# Patient Record
Sex: FEMALE | Race: WHITE | NOT HISPANIC OR LATINO | URBAN - METROPOLITAN AREA
[De-identification: names, ages, dates, MRNs, and addresses within clinical notes are randomized per-mention and may not be internally consistent; named-entity substitution may affect disease eponyms.]

---

## 2017-04-28 ENCOUNTER — OUTPATIENT (OUTPATIENT)
Dept: OUTPATIENT SERVICES | Facility: HOSPITAL | Age: 42
LOS: 1 days | Discharge: HOME | End: 2017-04-28

## 2017-06-28 DIAGNOSIS — R42 DIZZINESS AND GIDDINESS: ICD-10-CM

## 2017-06-28 DIAGNOSIS — R00.2 PALPITATIONS: ICD-10-CM

## 2017-06-28 DIAGNOSIS — R06.02 SHORTNESS OF BREATH: ICD-10-CM

## 2017-10-01 ENCOUNTER — EMERGENCY (EMERGENCY)
Facility: HOSPITAL | Age: 42
LOS: 0 days | Discharge: HOME | End: 2017-10-01
Admitting: INTERNAL MEDICINE

## 2017-10-01 DIAGNOSIS — M79.89 OTHER SPECIFIED SOFT TISSUE DISORDERS: ICD-10-CM

## 2017-10-01 DIAGNOSIS — Z90.49 ACQUIRED ABSENCE OF OTHER SPECIFIED PARTS OF DIGESTIVE TRACT: ICD-10-CM

## 2017-10-01 DIAGNOSIS — I10 ESSENTIAL (PRIMARY) HYPERTENSION: ICD-10-CM
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
## 2017-10-01 DIAGNOSIS — Z79.899 OTHER LONG TERM (CURRENT) DRUG THERAPY: ICD-10-CM

## 2017-10-01 DIAGNOSIS — Z79.82 LONG TERM (CURRENT) USE OF ASPIRIN: ICD-10-CM
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
## 2017-10-01 DIAGNOSIS — M25.562 PAIN IN LEFT KNEE: ICD-10-CM

## 2017-10-01 DIAGNOSIS — I48.91 UNSPECIFIED ATRIAL FIBRILLATION: ICD-10-CM

## 2017-10-01 DIAGNOSIS — E78.00 PURE HYPERCHOLESTEROLEMIA, UNSPECIFIED: ICD-10-CM

## 2017-10-01 DIAGNOSIS — F41.9 ANXIETY DISORDER, UNSPECIFIED: ICD-10-CM

## 2019-03-19 ENCOUNTER — TRANSCRIPTION ENCOUNTER (OUTPATIENT)
Age: 44
End: 2019-03-19

## 2019-03-19 ENCOUNTER — OUTPATIENT (OUTPATIENT)
Dept: OUTPATIENT SERVICES | Facility: HOSPITAL | Age: 44
LOS: 1 days | Discharge: HOME | End: 2019-03-19

## 2019-03-19 VITALS — HEIGHT: 66.14 IN | WEIGHT: 293 LBS

## 2019-03-19 VITALS — HEART RATE: 80 BPM | SYSTOLIC BLOOD PRESSURE: 118 MMHG | RESPIRATION RATE: 18 BRPM | DIASTOLIC BLOOD PRESSURE: 72 MMHG

## 2019-03-19 DIAGNOSIS — Z90.49 ACQUIRED ABSENCE OF OTHER SPECIFIED PARTS OF DIGESTIVE TRACT: Chronic | ICD-10-CM

## 2019-03-19 DIAGNOSIS — Z98.890 OTHER SPECIFIED POSTPROCEDURAL STATES: Chronic | ICD-10-CM

## 2019-03-19 RX ORDER — SODIUM CHLORIDE 9 MG/ML
1000 INJECTION, SOLUTION INTRAVENOUS
Qty: 0 | Refills: 0 | Status: DISCONTINUED | OUTPATIENT
Start: 2019-03-19 | End: 2019-04-03

## 2019-03-19 RX ORDER — ONDANSETRON 8 MG/1
4 TABLET, FILM COATED ORAL ONCE
Qty: 0 | Refills: 0 | Status: DISCONTINUED | OUTPATIENT
Start: 2019-03-19 | End: 2019-04-03

## 2019-03-19 NOTE — PRE-ANESTHESIA EVALUATION ADULT - NSANTHOSAYNRD_GEN_A_CORE
No. KANDICE screening performed.  STOP BANG Legend: 0-2 = LOW Risk; 3-4 = INTERMEDIATE Risk; 5-8 = HIGH Risk

## 2019-03-19 NOTE — H&P PST ADULT - HISTORY OF PRESENT ILLNESS
A 44 y old female patient with hx of Afib on xarelto off now, KANDICE on CPAP presented for colonoscopy after positive FOBT

## 2019-03-20 LAB — SURGICAL PATHOLOGY STUDY: SIGNIFICANT CHANGE UP

## 2019-03-24 DIAGNOSIS — K64.8 OTHER HEMORRHOIDS: ICD-10-CM

## 2019-03-24 DIAGNOSIS — K57.30 DIVERTICULOSIS OF LARGE INTESTINE WITHOUT PERFORATION OR ABSCESS WITHOUT BLEEDING: ICD-10-CM

## 2019-03-24 DIAGNOSIS — R19.5 OTHER FECAL ABNORMALITIES: ICD-10-CM

## 2019-03-24 DIAGNOSIS — D12.8 BENIGN NEOPLASM OF RECTUM: ICD-10-CM

## 2019-03-27 PROBLEM — M54.9 DORSALGIA, UNSPECIFIED: Chronic | Status: ACTIVE | Noted: 2019-03-19

## 2019-03-27 PROBLEM — I10 ESSENTIAL (PRIMARY) HYPERTENSION: Chronic | Status: ACTIVE | Noted: 2019-03-19

## 2019-03-27 PROBLEM — I48.91 UNSPECIFIED ATRIAL FIBRILLATION: Chronic | Status: ACTIVE | Noted: 2019-03-19

## 2020-11-20 NOTE — ASU DISCHARGE PLAN (ADULT/PEDIATRIC) - D. IF YOU HAD ANY TYPE OF SEDATION, YOU MAY EXPERIENCE LIGHTHEADEDNESS, DIZZINESS, OR SLEEPINESS FOLLOWING YOUR PROCEDURE. A RESPONSIBLE ADULT SHOULD STAY WITH YOU FOR AT LEAST 24 HOURS FOLLOWING YOUR PROCEDURE.
[General Appearance - Well Developed] : well developed [Normal Appearance] : normal appearance [Well Groomed] : well groomed [General Appearance - Well Nourished] : well nourished Statement Selected [No Deformities] : no deformities [General Appearance - In No Acute Distress] : no acute distress [Normal Conjunctiva] : the conjunctiva exhibited no abnormalities [Eyelids - No Xanthelasma] : the eyelids demonstrated no xanthelasmas [Normal Oral Mucosa] : normal oral mucosa [No Oral Pallor] : no oral pallor [No Oral Cyanosis] : no oral cyanosis [Normal Jugular Venous A Waves Present] : normal jugular venous A waves present [Normal Jugular Venous V Waves Present] : normal jugular venous V waves present [No Jugular Venous Don A Waves] : no jugular venous don A waves [Heart Rate And Rhythm] : heart rate and rhythm were normal [Heart Sounds] : normal S1 and S2 [Murmurs] : no murmurs present [Respiration, Rhythm And Depth] : normal respiratory rhythm and effort [Exaggerated Use Of Accessory Muscles For Inspiration] : no accessory muscle use [Auscultation Breath Sounds / Voice Sounds] : lungs were clear to auscultation bilaterally [Abdomen Soft] : soft [Abdomen Tenderness] : non-tender [Abdomen Mass (___ Cm)] : no abdominal mass palpated [Abnormal Walk] : normal gait [Gait - Sufficient For Exercise Testing] : the gait was sufficient for exercise testing [Nail Clubbing] : no clubbing of the fingernails [Cyanosis, Localized] : no localized cyanosis [Petechial Hemorrhages (___cm)] : no petechial hemorrhages [Skin Color & Pigmentation] : normal skin color and pigmentation [] : no rash [No Venous Stasis] : no venous stasis [Skin Lesions] : no skin lesions [No Skin Ulcers] : no skin ulcer [No Xanthoma] : no  xanthoma was observed [Oriented To Time, Place, And Person] : oriented to person, place, and time [Affect] : the affect was normal [Mood] : the mood was normal [No Anxiety] : not feeling anxious

## 2023-03-02 ENCOUNTER — OUTPATIENT (OUTPATIENT)
Dept: OUTPATIENT SERVICES | Facility: HOSPITAL | Age: 48
LOS: 1 days | End: 2023-03-02
Payer: COMMERCIAL

## 2023-03-02 ENCOUNTER — LABORATORY RESULT (OUTPATIENT)
Age: 48
End: 2023-03-02

## 2023-03-02 ENCOUNTER — APPOINTMENT (OUTPATIENT)
Dept: BURN CARE | Facility: CLINIC | Age: 48
End: 2023-03-02
Payer: COMMERCIAL

## 2023-03-02 VITALS — SYSTOLIC BLOOD PRESSURE: 130 MMHG | HEART RATE: 110 BPM | DIASTOLIC BLOOD PRESSURE: 80 MMHG

## 2023-03-02 DIAGNOSIS — Z98.890 OTHER SPECIFIED POSTPROCEDURAL STATES: Chronic | ICD-10-CM

## 2023-03-02 DIAGNOSIS — L97.909 VARICOSE VEINS OF UNSPECIFIED LOWER EXTREMITY WITH ULCER OF UNSPECIFIED SITE: ICD-10-CM

## 2023-03-02 DIAGNOSIS — Z90.49 ACQUIRED ABSENCE OF OTHER SPECIFIED PARTS OF DIGESTIVE TRACT: Chronic | ICD-10-CM

## 2023-03-02 DIAGNOSIS — Z00.8 ENCOUNTER FOR OTHER GENERAL EXAMINATION: ICD-10-CM

## 2023-03-02 DIAGNOSIS — I83.009 VARICOSE VEINS OF UNSPECIFIED LOWER EXTREMITY WITH ULCER OF UNSPECIFIED SITE: ICD-10-CM

## 2023-03-02 PROCEDURE — 87186 SC STD MICRODIL/AGAR DIL: CPT

## 2023-03-02 PROCEDURE — 87077 CULTURE AEROBIC IDENTIFY: CPT

## 2023-03-02 PROCEDURE — 99213 OFFICE O/P EST LOW 20 MIN: CPT

## 2023-03-02 PROCEDURE — 87070 CULTURE OTHR SPECIMN AEROBIC: CPT

## 2023-03-02 PROCEDURE — 87075 CULTR BACTERIA EXCEPT BLOOD: CPT

## 2023-03-02 NOTE — REASON FOR VISIT
[Initial] : initial visit [Were you seen in the Emergency Room?] : seen in the emergency room [Were you admitted to the burn center at [facility]?] : not admitted to the burn center at [facility] [Family Member] : family member

## 2023-03-02 NOTE — ASSESSMENT
[FreeTextEntry1] : The venous stasis ulcers on the right leg measures 5x5cm and the left leg  measures  [id number].  A wound culture was obtained. The left leg has adherent devitalized tissue.  The patient was instructed to clean  the wound with soap and water. Wash the wound with hibiclens soap and apply topical mupiricin ointment. Follow up 1 week. May need debridement .  Ace wrap compression applied.  [Wound Care] : wound care

## 2023-03-02 NOTE — PHYSICAL EXAM
[Size%: ______] : Size: [unfilled]% [5] : 5 out of 10 [Abnormal] : abnormal [Large] : medium [] : no [de-identified] : cont current pain meds and consider pain management [de-identified] : The venous stasis ulcers on the right leg measures 5x5cm and the left leg  measures  [id number].  A wound culture was obtained. The left leg has adherent devitalized tissue.  The patient was instructed to clean  the wound with soap and water. Wash the wound with hibiclens soap and apply topical mupiricin ointment. Follow up 1 week. May need debridement .  Ace wrap compression applied.  [TWNoteComboBox1] : xeroform
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
## 2023-03-03 DIAGNOSIS — I83.019 VARICOSE VEINS OF RIGHT LOWER EXTREMITY WITH ULCER OF UNSPECIFIED SITE: ICD-10-CM

## 2023-03-03 DIAGNOSIS — I83.029 VARICOSE VEINS OF LEFT LOWER EXTREMITY WITH ULCER OF UNSPECIFIED SITE: ICD-10-CM

## 2023-03-03 DIAGNOSIS — L97.829 NON-PRESSURE CHRONIC ULCER OF OTHER PART OF LEFT LOWER LEG WITH UNSPECIFIED SEVERITY: ICD-10-CM

## 2023-03-03 DIAGNOSIS — L97.929 NON-PRESSURE CHRONIC ULCER OF UNSPECIFIED PART OF LEFT LOWER LEG WITH UNSPECIFIED SEVERITY: ICD-10-CM

## 2023-03-08 LAB — BACTERIA SPEC CULT: ABNORMAL

## 2023-03-09 ENCOUNTER — APPOINTMENT (OUTPATIENT)
Dept: BURN CARE | Facility: CLINIC | Age: 48
End: 2023-03-09
Payer: COMMERCIAL

## 2023-03-09 ENCOUNTER — OUTPATIENT (OUTPATIENT)
Dept: OUTPATIENT SERVICES | Facility: HOSPITAL | Age: 48
LOS: 1 days | End: 2023-03-09
Payer: COMMERCIAL

## 2023-03-09 ENCOUNTER — TRANSCRIPTION ENCOUNTER (OUTPATIENT)
Age: 48
End: 2023-03-09

## 2023-03-09 VITALS — HEART RATE: 105 BPM | DIASTOLIC BLOOD PRESSURE: 89 MMHG | TEMPERATURE: 98.7 F | SYSTOLIC BLOOD PRESSURE: 135 MMHG

## 2023-03-09 DIAGNOSIS — Z90.49 ACQUIRED ABSENCE OF OTHER SPECIFIED PARTS OF DIGESTIVE TRACT: Chronic | ICD-10-CM

## 2023-03-09 DIAGNOSIS — Z00.8 ENCOUNTER FOR OTHER GENERAL EXAMINATION: ICD-10-CM

## 2023-03-09 DIAGNOSIS — Z00.00 ENCOUNTER FOR GENERAL ADULT MEDICAL EXAMINATION W/OUT ABNORMAL FINDINGS: ICD-10-CM

## 2023-03-09 DIAGNOSIS — Z98.890 OTHER SPECIFIED POSTPROCEDURAL STATES: Chronic | ICD-10-CM

## 2023-03-09 PROCEDURE — 99213 OFFICE O/P EST LOW 20 MIN: CPT

## 2023-03-09 PROCEDURE — 87186 SC STD MICRODIL/AGAR DIL: CPT

## 2023-03-09 PROCEDURE — 87070 CULTURE OTHR SPECIMN AEROBIC: CPT

## 2023-03-09 PROCEDURE — 87077 CULTURE AEROBIC IDENTIFY: CPT
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
## 2023-03-09 NOTE — REASON FOR VISIT
[Revisit] : revisit [Were you seen in the Emergency Room?] : seen in the emergency room [Were you admitted to the burn center at [facility]?] : not admitted to the burn center at [facility] [Family Member] : family member

## 2023-03-09 NOTE — ASSESSMENT
[FreeTextEntry1] : The venous stasis ulcers on the right leg measures 5x5cm and the left leg  measures  [id number].  A wound culture was obtained. The left leg has  decreased adherent devitalized tissue.  The patient was instructed to clean  the wound with soap and water. Wash the wound with hibiclens soap and apply topical mupiricin ointment. Follow up 1 week. Topical and  start oral antibiotics   ID referrel .  Ace wrap compression applied.  [Wound Care] : wound care

## 2023-03-10 DIAGNOSIS — L97.319 NON-PRESSURE CHRONIC ULCER OF RIGHT ANKLE WITH UNSPECIFIED SEVERITY: ICD-10-CM

## 2023-03-10 DIAGNOSIS — I83.029 VARICOSE VEINS OF LEFT LOWER EXTREMITY WITH ULCER OF UNSPECIFIED SITE: ICD-10-CM

## 2023-03-10 DIAGNOSIS — I83.013 VARICOSE VEINS OF RIGHT LOWER EXTREMITY WITH ULCER OF ANKLE: ICD-10-CM

## 2023-03-10 DIAGNOSIS — L08.89 OTHER SPECIFIED LOCAL INFECTIONS OF THE SKIN AND SUBCUTANEOUS TISSUE: ICD-10-CM

## 2023-03-10 DIAGNOSIS — L97.929 NON-PRESSURE CHRONIC ULCER OF UNSPECIFIED PART OF LEFT LOWER LEG WITH UNSPECIFIED SEVERITY: ICD-10-CM

## 2023-03-11 LAB — BACTERIA SPEC CULT: ABNORMAL

## 2023-03-30 ENCOUNTER — OUTPATIENT (OUTPATIENT)
Dept: OUTPATIENT SERVICES | Facility: HOSPITAL | Age: 48
LOS: 1 days | End: 2023-03-30
Payer: COMMERCIAL

## 2023-03-30 ENCOUNTER — APPOINTMENT (OUTPATIENT)
Dept: BURN CARE | Facility: CLINIC | Age: 48
End: 2023-03-30
Payer: COMMERCIAL

## 2023-03-30 VITALS — TEMPERATURE: 98 F | HEART RATE: 103 BPM | SYSTOLIC BLOOD PRESSURE: 162 MMHG | DIASTOLIC BLOOD PRESSURE: 109 MMHG

## 2023-03-30 DIAGNOSIS — Z90.49 ACQUIRED ABSENCE OF OTHER SPECIFIED PARTS OF DIGESTIVE TRACT: Chronic | ICD-10-CM

## 2023-03-30 DIAGNOSIS — Z98.890 OTHER SPECIFIED POSTPROCEDURAL STATES: Chronic | ICD-10-CM

## 2023-03-30 DIAGNOSIS — Z00.8 ENCOUNTER FOR OTHER GENERAL EXAMINATION: ICD-10-CM

## 2023-03-30 PROCEDURE — 87077 CULTURE AEROBIC IDENTIFY: CPT

## 2023-03-30 PROCEDURE — 99212 OFFICE O/P EST SF 10 MIN: CPT

## 2023-03-30 PROCEDURE — 87186 SC STD MICRODIL/AGAR DIL: CPT

## 2023-03-30 PROCEDURE — 87070 CULTURE OTHR SPECIMN AEROBIC: CPT
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
## 2023-03-30 NOTE — ASSESSMENT
[FreeTextEntry1] : The venous stasis ulcers on the right leg measures 5x5cm and the left leg  measures  [id number].  A wound culture was obtained. The left leg has  decreased adherent devitalized tissue.  The patient was instructed to clean  the wound with soap and water. Wash the wound with hibiclens soap and apply topical mupiricin ointment. Follow up 3 week.   ID referrel .  Ace wrap compression applied.  [Wound Care] : wound care

## 2023-03-30 NOTE — PHYSICAL EXAM
[Healing] : healing [Size%: ______] : Size: [unfilled]% [Infected?] : Infected: No [3] : 3 out of 10 [Abnormal] : abnormal [Large] : medium [] : yes [de-identified] : cont current pain meds and consider pain management [de-identified] : The venous stasis ulcers on the right leg measures 5x5cm and the left leg  measures  [id number].  A wound culture was obtained. The left leg has  decreased adherent devitalized tissue.  The patient was instructed to clean  the wound with soap and water. Wash the wound with hibiclens soap and apply topical mupiricin ointment. Follow up 3 week.   ID referrel .  Ace wrap compression applied.  [TWNoteComboBox1] : xeroform

## 2023-03-30 NOTE — REASON FOR VISIT
[Revisit] : revisit [Were you seen in the Emergency Room?] : seen in the emergency room [Were you admitted to the burn center at [facility]?] : not admitted to the burn center at [facility] [Family Member] : family member

## 2023-03-31 DIAGNOSIS — I83.029 VARICOSE VEINS OF LEFT LOWER EXTREMITY WITH ULCER OF UNSPECIFIED SITE: ICD-10-CM

## 2023-03-31 DIAGNOSIS — L97.929 NON-PRESSURE CHRONIC ULCER OF UNSPECIFIED PART OF LEFT LOWER LEG WITH UNSPECIFIED SEVERITY: ICD-10-CM

## 2023-03-31 DIAGNOSIS — I83.013 VARICOSE VEINS OF RIGHT LOWER EXTREMITY WITH ULCER OF ANKLE: ICD-10-CM

## 2023-03-31 DIAGNOSIS — L97.319 NON-PRESSURE CHRONIC ULCER OF RIGHT ANKLE WITH UNSPECIFIED SEVERITY: ICD-10-CM

## 2023-04-02 LAB — BACTERIA SPEC CULT: ABNORMAL

## 2023-04-26 NOTE — PHYSICAL EXAM
[Healing] : healing no [Size%: ______] : Size: [unfilled]% [Infected?] : Infected: Yes [3] : 3 out of 10 [Abnormal] : abnormal [Large] : medium [] : no [de-identified] : cont current pain meds and consider pain management [de-identified] : The venous stasis ulcers on the right leg measures 5x5cm and the left leg  measures  [id number].  A wound culture was obtained. The left leg has  decreased adherent devitalized tissue.  The patient was instructed to clean  the wound with soap and water. Wash the wound with hibiclens soap and apply topical mupiricin ointment. Follow up 1 week. Topical and  start oral antibiotics   ID referrel .  Ace wrap compression applied.  [TWNoteComboBox1] : xeroform

## 2023-04-27 ENCOUNTER — OUTPATIENT (OUTPATIENT)
Dept: OUTPATIENT SERVICES | Facility: HOSPITAL | Age: 48
LOS: 1 days | End: 2023-04-27
Payer: COMMERCIAL

## 2023-04-27 ENCOUNTER — APPOINTMENT (OUTPATIENT)
Dept: BURN CARE | Facility: CLINIC | Age: 48
End: 2023-04-27
Payer: COMMERCIAL

## 2023-04-27 VITALS — SYSTOLIC BLOOD PRESSURE: 120 MMHG | HEART RATE: 80 BPM | TEMPERATURE: 97.6 F | DIASTOLIC BLOOD PRESSURE: 78 MMHG

## 2023-04-27 DIAGNOSIS — Z90.49 ACQUIRED ABSENCE OF OTHER SPECIFIED PARTS OF DIGESTIVE TRACT: Chronic | ICD-10-CM

## 2023-04-27 DIAGNOSIS — Z98.890 OTHER SPECIFIED POSTPROCEDURAL STATES: Chronic | ICD-10-CM

## 2023-04-27 DIAGNOSIS — Z00.8 ENCOUNTER FOR OTHER GENERAL EXAMINATION: ICD-10-CM

## 2023-04-27 PROCEDURE — 87070 CULTURE OTHR SPECIMN AEROBIC: CPT

## 2023-04-27 PROCEDURE — 99212 OFFICE O/P EST SF 10 MIN: CPT

## 2023-04-27 PROCEDURE — 87186 SC STD MICRODIL/AGAR DIL: CPT

## 2023-04-27 NOTE — PHYSICAL EXAM
Quality 130: Documentation Of Current Medications In The Medical Record: Current Medications Documented Quality 431: Preventive Care And Screening: Unhealthy Alcohol Use - Screening: Patient not identified as an unhealthy alcohol user when screened for unhealthy alcohol use using a systematic screening method Detail Level: Detailed Quality 226: Preventive Care And Screening: Tobacco Use: Screening And Cessation Intervention: Patient screened for tobacco use and is an ex/non-smoker [Healing] : healing [Size%: ______] : Size: [unfilled]% [Infected?] : Infected: No [3] : 3 out of 10 [Abnormal] : abnormal [Large] : medium [] : no [de-identified] : cont current pain meds and consider pain management [de-identified] : The venous stasis ulcers on the right leg measures 1x1cm and the left leg  measures  [id number].  A wound culture was obtained. The left leg has  decreased adherent devitalized tissue.  The patient was instructed to clean  the wound with soap and water. Wash the wound with hibiclens soap and apply topical mupiricin ointment. Follow up 2-4week.  .  Ace wrap compression applied.  [TWNoteComboBox1] : xeroform

## 2023-04-27 NOTE — REASON FOR VISIT
[Revisit] : revisit [Were you seen in the Emergency Room?] : seen in the emergency room [Were you admitted to the burn center at [facility]?] : not admitted to the burn center at [facility] [Family Member] : family member

## 2023-04-27 NOTE — ASSESSMENT
[FreeTextEntry1] : The venous stasis ulcers on the right leg measures 1x1cm and the left leg  measures  [id number].  A wound culture was obtained. The left leg has  decreased adherent devitalized tissue.  The patient was instructed to clean  the wound with soap and water. Wash the wound with hibiclens soap and apply topical mupiricin ointment. Follow up 2-4week.  .  Ace wrap compression applied.  [Wound Care] : wound care
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
## 2023-04-28 DIAGNOSIS — I83.013 VARICOSE VEINS OF RIGHT LOWER EXTREMITY WITH ULCER OF ANKLE: ICD-10-CM

## 2023-04-28 DIAGNOSIS — I83.029 VARICOSE VEINS OF LEFT LOWER EXTREMITY WITH ULCER OF UNSPECIFIED SITE: ICD-10-CM

## 2023-04-28 DIAGNOSIS — L97.929 NON-PRESSURE CHRONIC ULCER OF UNSPECIFIED PART OF LEFT LOWER LEG WITH UNSPECIFIED SEVERITY: ICD-10-CM

## 2023-04-28 DIAGNOSIS — L97.319 NON-PRESSURE CHRONIC ULCER OF RIGHT ANKLE WITH UNSPECIFIED SEVERITY: ICD-10-CM

## 2023-04-29 LAB — BACTERIA SPEC CULT: ABNORMAL

## 2023-05-25 ENCOUNTER — APPOINTMENT (OUTPATIENT)
Dept: BURN CARE | Facility: CLINIC | Age: 48
End: 2023-05-25

## 2023-06-07 ENCOUNTER — INPATIENT (INPATIENT)
Facility: HOSPITAL | Age: 48
LOS: 0 days | Discharge: HOME HEALTH | DRG: 309 | End: 2023-06-08
Attending: INTERNAL MEDICINE | Admitting: INTERNAL MEDICINE
Payer: COMMERCIAL

## 2023-06-07 VITALS
DIASTOLIC BLOOD PRESSURE: 107 MMHG | RESPIRATION RATE: 20 BRPM | WEIGHT: 293 LBS | HEIGHT: 66 IN | SYSTOLIC BLOOD PRESSURE: 188 MMHG | OXYGEN SATURATION: 96 % | HEART RATE: 161 BPM | TEMPERATURE: 98 F

## 2023-06-07 DIAGNOSIS — Z90.49 ACQUIRED ABSENCE OF OTHER SPECIFIED PARTS OF DIGESTIVE TRACT: Chronic | ICD-10-CM

## 2023-06-07 DIAGNOSIS — Z98.890 OTHER SPECIFIED POSTPROCEDURAL STATES: Chronic | ICD-10-CM

## 2023-06-07 DIAGNOSIS — I48.91 UNSPECIFIED ATRIAL FIBRILLATION: ICD-10-CM

## 2023-06-07 LAB
ALBUMIN SERPL ELPH-MCNC: 3.9 G/DL — SIGNIFICANT CHANGE UP (ref 3.5–5.2)
ALP SERPL-CCNC: 102 U/L — SIGNIFICANT CHANGE UP (ref 30–115)
ALT FLD-CCNC: 20 U/L — SIGNIFICANT CHANGE UP (ref 0–41)
ANION GAP SERPL CALC-SCNC: 15 MMOL/L — HIGH (ref 7–14)
AST SERPL-CCNC: 27 U/L — SIGNIFICANT CHANGE UP (ref 0–41)
BASOPHILS # BLD AUTO: 0.03 K/UL — SIGNIFICANT CHANGE UP (ref 0–0.2)
BASOPHILS NFR BLD AUTO: 0.4 % — SIGNIFICANT CHANGE UP (ref 0–1)
BILIRUB SERPL-MCNC: 0.6 MG/DL — SIGNIFICANT CHANGE UP (ref 0.2–1.2)
BUN SERPL-MCNC: 16 MG/DL — SIGNIFICANT CHANGE UP (ref 10–20)
CALCIUM SERPL-MCNC: 9.3 MG/DL — SIGNIFICANT CHANGE UP (ref 8.4–10.5)
CHLORIDE SERPL-SCNC: 101 MMOL/L — SIGNIFICANT CHANGE UP (ref 98–110)
CO2 SERPL-SCNC: 25 MMOL/L — SIGNIFICANT CHANGE UP (ref 17–32)
CREAT SERPL-MCNC: 0.6 MG/DL — LOW (ref 0.7–1.5)
EGFR: 109 ML/MIN/1.73M2 — SIGNIFICANT CHANGE UP
EOSINOPHIL # BLD AUTO: 0.04 K/UL — SIGNIFICANT CHANGE UP (ref 0–0.7)
EOSINOPHIL NFR BLD AUTO: 0.5 % — SIGNIFICANT CHANGE UP (ref 0–8)
GLUCOSE SERPL-MCNC: 113 MG/DL — HIGH (ref 70–99)
HCT VFR BLD CALC: 39.6 % — SIGNIFICANT CHANGE UP (ref 37–47)
HGB BLD-MCNC: 12.6 G/DL — SIGNIFICANT CHANGE UP (ref 12–16)
IMM GRANULOCYTES NFR BLD AUTO: 0.4 % — HIGH (ref 0.1–0.3)
LYMPHOCYTES # BLD AUTO: 0.89 K/UL — LOW (ref 1.2–3.4)
LYMPHOCYTES # BLD AUTO: 12.1 % — LOW (ref 20.5–51.1)
MAGNESIUM SERPL-MCNC: 2 MG/DL — SIGNIFICANT CHANGE UP (ref 1.8–2.4)
MCHC RBC-ENTMCNC: 28.1 PG — SIGNIFICANT CHANGE UP (ref 27–31)
MCHC RBC-ENTMCNC: 31.8 G/DL — LOW (ref 32–37)
MCV RBC AUTO: 88.2 FL — SIGNIFICANT CHANGE UP (ref 81–99)
MONOCYTES # BLD AUTO: 0.8 K/UL — HIGH (ref 0.1–0.6)
MONOCYTES NFR BLD AUTO: 10.9 % — HIGH (ref 1.7–9.3)
NEUTROPHILS # BLD AUTO: 5.55 K/UL — SIGNIFICANT CHANGE UP (ref 1.4–6.5)
NEUTROPHILS NFR BLD AUTO: 75.7 % — HIGH (ref 42.2–75.2)
NRBC # BLD: 0 /100 WBCS — SIGNIFICANT CHANGE UP (ref 0–0)
NT-PROBNP SERPL-SCNC: 4009 PG/ML — HIGH (ref 0–300)
PLATELET # BLD AUTO: 271 K/UL — SIGNIFICANT CHANGE UP (ref 130–400)
PMV BLD: 9.8 FL — SIGNIFICANT CHANGE UP (ref 7.4–10.4)
POTASSIUM SERPL-MCNC: 3.6 MMOL/L — SIGNIFICANT CHANGE UP (ref 3.5–5)
POTASSIUM SERPL-SCNC: 3.6 MMOL/L — SIGNIFICANT CHANGE UP (ref 3.5–5)
PROT SERPL-MCNC: 6.9 G/DL — SIGNIFICANT CHANGE UP (ref 6–8)
RBC # BLD: 4.49 M/UL — SIGNIFICANT CHANGE UP (ref 4.2–5.4)
RBC # FLD: 16.1 % — HIGH (ref 11.5–14.5)
SODIUM SERPL-SCNC: 141 MMOL/L — SIGNIFICANT CHANGE UP (ref 135–146)
TROPONIN T SERPL-MCNC: <0.01 NG/ML — SIGNIFICANT CHANGE UP
WBC # BLD: 7.34 K/UL — SIGNIFICANT CHANGE UP (ref 4.8–10.8)
WBC # FLD AUTO: 7.34 K/UL — SIGNIFICANT CHANGE UP (ref 4.8–10.8)

## 2023-06-07 PROCEDURE — 36415 COLL VENOUS BLD VENIPUNCTURE: CPT

## 2023-06-07 PROCEDURE — 83735 ASSAY OF MAGNESIUM: CPT

## 2023-06-07 PROCEDURE — 99291 CRITICAL CARE FIRST HOUR: CPT

## 2023-06-07 PROCEDURE — 87186 SC STD MICRODIL/AGAR DIL: CPT

## 2023-06-07 PROCEDURE — 99223 1ST HOSP IP/OBS HIGH 75: CPT

## 2023-06-07 PROCEDURE — 85379 FIBRIN DEGRADATION QUANT: CPT

## 2023-06-07 PROCEDURE — 80061 LIPID PANEL: CPT

## 2023-06-07 PROCEDURE — 71045 X-RAY EXAM CHEST 1 VIEW: CPT | Mod: 26

## 2023-06-07 PROCEDURE — 93010 ELECTROCARDIOGRAM REPORT: CPT

## 2023-06-07 PROCEDURE — 80053 COMPREHEN METABOLIC PANEL: CPT

## 2023-06-07 PROCEDURE — 84484 ASSAY OF TROPONIN QUANT: CPT

## 2023-06-07 PROCEDURE — 83036 HEMOGLOBIN GLYCOSYLATED A1C: CPT

## 2023-06-07 PROCEDURE — 84443 ASSAY THYROID STIM HORMONE: CPT

## 2023-06-07 PROCEDURE — 87077 CULTURE AEROBIC IDENTIFY: CPT

## 2023-06-07 PROCEDURE — 85025 COMPLETE CBC W/AUTO DIFF WBC: CPT

## 2023-06-07 PROCEDURE — 87070 CULTURE OTHR SPECIMN AEROBIC: CPT

## 2023-06-07 RX ORDER — LOSARTAN POTASSIUM 100 MG/1
50 TABLET, FILM COATED ORAL DAILY
Refills: 0 | Status: DISCONTINUED | OUTPATIENT
Start: 2023-06-07 | End: 2023-06-08

## 2023-06-07 RX ORDER — DILTIAZEM HCL 120 MG
40 CAPSULE, EXT RELEASE 24 HR ORAL ONCE
Refills: 0 | Status: COMPLETED | OUTPATIENT
Start: 2023-06-07 | End: 2023-06-07

## 2023-06-07 RX ORDER — DILTIAZEM HCL 120 MG
5 CAPSULE, EXT RELEASE 24 HR ORAL
Qty: 125 | Refills: 0 | Status: DISCONTINUED | OUTPATIENT
Start: 2023-06-07 | End: 2023-06-08

## 2023-06-07 RX ORDER — METOPROLOL TARTRATE 50 MG
1 TABLET ORAL
Qty: 0 | Refills: 0 | DISCHARGE

## 2023-06-07 RX ORDER — DILTIAZEM HCL 120 MG
20 CAPSULE, EXT RELEASE 24 HR ORAL ONCE
Refills: 0 | Status: COMPLETED | OUTPATIENT
Start: 2023-06-07 | End: 2023-06-07

## 2023-06-07 RX ORDER — ENOXAPARIN SODIUM 100 MG/ML
150 INJECTION SUBCUTANEOUS ONCE
Refills: 0 | Status: COMPLETED | OUTPATIENT
Start: 2023-06-07 | End: 2023-06-07

## 2023-06-07 RX ORDER — DIGOXIN 250 MCG
250 TABLET ORAL ONCE
Refills: 0 | Status: COMPLETED | OUTPATIENT
Start: 2023-06-07 | End: 2023-06-07

## 2023-06-07 RX ORDER — ATORVASTATIN CALCIUM 80 MG/1
80 TABLET, FILM COATED ORAL AT BEDTIME
Refills: 0 | Status: DISCONTINUED | OUTPATIENT
Start: 2023-06-07 | End: 2023-06-08

## 2023-06-07 RX ORDER — APIXABAN 2.5 MG/1
5 TABLET, FILM COATED ORAL EVERY 12 HOURS
Refills: 0 | Status: DISCONTINUED | OUTPATIENT
Start: 2023-06-08 | End: 2023-06-08

## 2023-06-07 RX ORDER — METOPROLOL TARTRATE 50 MG
25 TABLET ORAL EVERY 12 HOURS
Refills: 0 | Status: DISCONTINUED | OUTPATIENT
Start: 2023-06-07 | End: 2023-06-08

## 2023-06-07 RX ORDER — METOPROLOL TARTRATE 50 MG
5 TABLET ORAL ONCE
Refills: 0 | Status: COMPLETED | OUTPATIENT
Start: 2023-06-07 | End: 2023-06-07

## 2023-06-07 RX ORDER — METOPROLOL TARTRATE 50 MG
25 TABLET ORAL EVERY 12 HOURS
Refills: 0 | Status: DISCONTINUED | OUTPATIENT
Start: 2023-06-07 | End: 2023-06-07

## 2023-06-07 RX ORDER — ACETAMINOPHEN 500 MG
650 TABLET ORAL EVERY 6 HOURS
Refills: 0 | Status: DISCONTINUED | OUTPATIENT
Start: 2023-06-07 | End: 2023-06-08

## 2023-06-07 RX ADMIN — Medication 650 MILLIGRAM(S): at 22:40

## 2023-06-07 RX ADMIN — ENOXAPARIN SODIUM 150 MILLIGRAM(S): 100 INJECTION SUBCUTANEOUS at 14:11

## 2023-06-07 RX ADMIN — Medication 650 MILLIGRAM(S): at 23:25

## 2023-06-07 RX ADMIN — Medication 5 MG/HR: at 20:24

## 2023-06-07 RX ADMIN — Medication 25 MILLIGRAM(S): at 16:29

## 2023-06-07 RX ADMIN — ATORVASTATIN CALCIUM 80 MILLIGRAM(S): 80 TABLET, FILM COATED ORAL at 21:09

## 2023-06-07 RX ADMIN — Medication 5 MILLIGRAM(S): at 18:41

## 2023-06-07 RX ADMIN — Medication 40 MILLIGRAM(S): at 13:13

## 2023-06-07 RX ADMIN — Medication 20 MILLIGRAM(S): at 12:20

## 2023-06-07 RX ADMIN — Medication 5 MG/HR: at 13:36

## 2023-06-07 RX ADMIN — Medication 250 MICROGRAM(S): at 18:26

## 2023-06-07 RX ADMIN — Medication 650 MILLIGRAM(S): at 16:40

## 2023-06-07 NOTE — ED PROVIDER NOTE - CRITICAL CARE ATTENDING CONTRIBUTION TO CARE
This was a shared visit with the ELI. I reviewed and verified the documentation and independently performed the documented:     Medical Decision Making.     Attending Contribution to Care: 48yF afib s/p ablation 2016 (sees Dr. Hartley) p/w palpitations.  Pt tachycardic to 140-150s in triage but denies CP, SOB, fever, cough.

## 2023-06-07 NOTE — ED ADULT NURSE NOTE - CAS EDN DISCHARGE INTERVENTIONS
----- Message from Zo Crowley sent at 6/27/2022  3:38 PM EDT -----  Contact: [phone]  PT CALLED AND SAID HER PCP SENT A REFERRAL OVER FOR COLONOSCOPY AND SHE IS ANXIOUS TO GET IT SCHEDULED BECAUSE SHE FAILED THE COLOGUARD.     IV intact

## 2023-06-07 NOTE — H&P ADULT - HISTORY OF PRESENT ILLNESS
This is a 49yo female with history of HTN, A-Fib s/p ablation (Dr. Cates @ [facility]) off AC, LE DVT, KANDICE compliant w/ CPAP, Raynaud's, ex-smoker (quit 6 years ago) who presents to the ED c/o palpitations.  Palpitations intermittent since a month ago, constant since last night around 6pm which prompted her visit here. She reports associated dyspnea on exertion and intermittent dizziness for the past month as well. Denies chest pain, leg edema, orthopnea, syncope, nausea, vomiting, diaphoresis, fevers, recent illness, urinary symptoms.   She also endorses chronic left posterior calf wound for which she sees Dr. Doyle, noticed increased drainage recently similar to prior infections and has been on multiple antibiotics for it.  Her cardiologist is Dr. Hartley. Recently had an outpatient ECHO a month ago which showed evidence of pulmonary HTN otherwise normal.     In ED,   T(F): 98.5 (06-07-23 @ 12:01), Max: 98.5 (06-07-23 @ 12:01)  HR: 144 (06-07-23 @ 15:38) (136 - 161)  BP: 156/101 (06-07-23 @ 14:58) (134/96 - 188/107)  RR: 18 (06-07-23 @ 15:38) (18 - 20)  SpO2: 97% (06-07-23 @ 15:38) (94% - 97%)  EKG and monitor showing A-Flutter w/ RVR. Started on Cardizem gtt HR improved 160s -> 120s.   Rest of labs including troponin unremarkable.   Admitted to telemetry for A-Flutter planned for cardioversion tomorrow.

## 2023-06-07 NOTE — CONSULT NOTE ADULT - ASSESSMENT
Cardiologist: Dr Hartley    49 yo F with hx of AF sp ablation (6 yrs ago, stopped OAC) and HTN admitted for palpitations and SOB. Found to be in AF/AFL    Impression:  AF/AFL RVR  HTN    Plan:  - NPO after MN for TOLU/DCCV tomorrow  - Cont cardizem, switch to PO  - Start Eliquis 5mg Q12h  - Follow up as outpatient in one month with Dr Frazier
Aflutter  HTN  chronic lymphedema  DLD    - no need to repeat echo. see results from one month ago above  - keep NPO after midnight for TOLU/ DCCV   - continue cardizem and metoprolol  - CHADVASC is high and will attempt cardioversion tomorrow so will need therapeutic anticoagulation going forward- please start eliquis 5 mg po q12h  - if HR is acceptable, will attempt CCTA while inpatient after cardioversion.   - spoke with Dr. Frazier over the phone for outpatient aflutter ablation- will arrange. no need to consult/.

## 2023-06-07 NOTE — H&P ADULT - TIME BILLING
direct patient care and chart review  -coordinated current plan of care with ED and medical staff  -possible CCU upgrade

## 2023-06-07 NOTE — H&P ADULT - NSHPREVIEWOFSYSTEMS_GEN_ALL_CORE
ROS:   Neuro: no syncope. no seizure. no vision change  General: no fevers. no chills.   HENT: no neck pain. no throat pain  Resp: +dyspnea on exertion. no wheezing. no cough. no hemoptysis  CV:as per HPI  GI: no abdominal pain. no nausea, vomiting. no diarrhea. no constipation. no hematemesis. no melena/hematochezia.  : no dysuria, frequency or hematuria  MSK/skin/ extremities: no edema. no myalgias. no weakness. no numbness. no rash.     PE:  Neuro: AAOx3, speech clear and fluent, moving all extremities  General: no acute distress  Head: atraumatic, normocephalic  Neck: no lymphadenopathy, no tracheal deviation  Eyes: EOMI, no icterus  Lungs/Chest: Clear to auscultation bilaterally, no wheeze  Heart: fast rate, irregular rhythm, S1/S2  Abdomen: BS audible, non-distended, soft, non-tender, no rigidity, no guarding  Extremities: no clubbing, no cyanosis, +large left posterior leg wound   Skin: warm and dry

## 2023-06-07 NOTE — ED PROVIDER NOTE - PROGRESS NOTE DETAILS
Spoke to Dr. Hartley, who agrees with diltiazem and gtt as necessary.  Admit to tele.  As CXR shows marked cardiomegaly (hx pulm HTN), will also do bedside echo to assess for pericardial effusion. EK (delayed entry) - initial dose of 20mg diltiazem w/ transient effect but HR now at 140s and BP still elevated.  Will proceed to diltiazem 40mg and consider gtt while awaiting cardiology.

## 2023-06-07 NOTE — ED ADULT NURSE NOTE - NSFALLUNIVINTERV_ED_ALL_ED
Bed/Stretcher in lowest position, wheels locked, appropriate side rails in place/Call bell, personal items and telephone in reach/Instruct patient to call for assistance before getting out of bed/chair/stretcher/Non-slip footwear applied when patient is off stretcher/[place]arr to call system/Physically safe environment - no spills, clutter or unnecessary equipment/Purposeful proactive rounding/Room/bathroom lighting operational, light cord in reach

## 2023-06-07 NOTE — ED ADULT NURSE REASSESSMENT NOTE - NS ED NURSE REASSESS COMMENT FT1
Patients heart rate noted to go up to 140s, currently 136 with bp 156/101. Pt in NAD on assessment denies chest pain, SOB. Cardizem drip rate currently at 15. MD Elmore made aware. Will continue drip at current rate as per md and no new orders at this time.

## 2023-06-07 NOTE — H&P ADULT - ASSESSMENT
49yo female with history of HTN, A-Fib s/p ablation (Dr. Cates @ [facility]) off AC, LE DVT, KANDICE compliant w/ CPAP, Raynaud's, ex-smoker (quit 6 years ago) who presents to the ED c/o palpitations.  Found to have A-Flutter with RVR    #A-flutter w/ RVR  -continue Cardizem gtt at current rate 15 mg/hr  -add PO Lopressor 25 bid   -start therapeutic Lovenox 150mg bid, chadsvasc 3  -monitor on telemetry  -plan for TOLU/ cardioversion tomorrow, npo after midnight  -consider EP For ablation if cardioversion fails to convert  -f/u with cardiology: Dr. Hartley  -f/u with TSH, ECHO, troponin, A1c, lipid profile    #Suspected pulmonary HTN  -as per pt on outpatient echo  -could explain pt's dyspnea on exertion  -f/u TTE  -pt takes sildenafil for Raynaud's     #Left leg wound with drainage  -sees Dr. oDyle  -burn consult for eval    #Raynaud's  -on sildenafil, can hold for now    #HTN  -on metoprolol succinate 50 mg, Losartan 50 mg, HCTZ 12.5 mg  -can continue home meds, switch BB To lopressor while inpatient    #KANDICE  -continue CPAP use - 9 cm H2o      DVT ppx: therapeutic lovenox    Code: full     49yo female with history of HTN, A-Fib s/p ablation (Dr. Cates @ [facility]) off AC, LE DVT, KANDICE compliant w/ CPAP, Raynaud's, ex-smoker (quit 6 years ago) who presents to the ED c/o palpitations.  Found to have A-Flutter with RVR    #A-flutter w/ RVR  -continue Cardizem gtt at current rate 15 mg/hr  -add PO Lopressor 25 bid   -start therapeutic Lovenox 150mg bid, chadsvasc 3  -monitor on telemetry  -plan for TOLU/ cardioversion tomorrow, npo after midnight  -consider EP For ablation if cardioversion fails to convert  -f/u with cardiology: Dr. Hartley  -f/u with TSH, ECHO, troponin, d-dimer, A1c, lipid profile    #Suspected pulmonary HTN  -as per pt on outpatient echo  -could explain pt's dyspnea on exertion  -f/u TTE  -pt takes sildenafil for Raynaud's     #Left leg wound with drainage  -sees Dr. Doyle  -burn consult for eval    #Raynaud's  -on sildenafil, can hold for now    #HTN  -on metoprolol succinate 50 mg, Losartan 50 mg, HCTZ 12.5 mg  -can continue home meds, switch BB To lopressor while inpatient    #KANDICE  -continue CPAP use - 9 cm H2o      DVT ppx: therapeutic lovenox    Code: full     49yo female with history of HTN, A-Fib s/p ablation (Dr. Cates @ [facility]) off AC, LE DVT, KANDICE compliant w/ CPAP, Raynaud's, ex-smoker (quit 6 years ago) who presents to the ED c/o palpitations.  Found to have A-Flutter with RVR    #A-flutter w/ RVR  #Hx A-Fib s/p ablation  -unclear trigger  -continue Cardizem gtt at current rate 15 mg/hr  -add PO Lopressor 25 bid   -start therapeutic Lovenox 150mg bid, chadsvasc 3  -monitor on telemetry  -plan for TOLU/ cardioversion tomorrow, npo after midnight  -consider EP For ablation if cardioversion fails to convert  -f/u with cardiology: Dr. Hartley  -f/u with TSH, ECHO, troponin, d-dimer, A1c, lipid profile    #Suspected pulmonary HTN  -as per pt on outpatient echo  -could explain pt's dyspnea on exertion  -f/u TTE  -pt takes sildenafil for Raynaud's     #Left leg wound with drainage  -sees Dr. Doyle, sepsis ruled out on admission  -burn consult for eval    #Raynaud's  -on sildenafil, can hold for now    #HTN  -on metoprolol succinate 50 mg, Losartan 50 mg, HCTZ 12.5 mg  -can continue home meds, switch BB To lopressor while inpatient    #KANDICE  -continue CPAP use - 9 cm H2o      DVT ppx: therapeutic lovenox    Code: full     49yo female with history of HTN, A-Fib s/p ablation (Dr. Cates @ [facility]) off AC, LE DVT, KANDICE compliant w/ CPAP, Raynaud's, ex-smoker (quit 6 years ago) who presents to the ED c/o palpitations.  Found to have A-Flutter with RVR    #A-flutter w/ RVR  #s/p ablation hx A-Fib  -unclear trigger  -continue Cardizem gtt at current rate 15 mg/hr  -add PO Lopressor 25 bid   -start therapeutic Lovenox 150mg bid, chadsvasc 3  -monitor on telemetry  -plan for TOLU/ cardioversion tomorrow, npo after midnight  -consider EP For ablation if cardioversion fails to convert  -f/u with cardiology: Dr. Hartley  -f/u with TSH, ECHO, troponin, d-dimer, A1c, lipid profile    #Suspected pulmonary HTN  -as per pt on outpatient echo  -could explain pt's dyspnea on exertion  -f/u TTE  -pt takes sildenafil for Raynaud's     #Left leg wound with drainage  -sees Dr. Doyle, sepsis ruled out on admission  -burn consult for eval    #Raynaud's  -on sildenafil, can hold for now    #HTN  -on metoprolol succinate 50 mg, Losartan 50 mg, HCTZ 12.5 mg  -can continue home meds, switch BB To lopressor while inpatient    #KANDICE  -continue CPAP use - 9 cm H2o      DVT ppx: therapeutic lovenox    Code: full     49yo female with history of HTN, A-Fib s/p ablation (Dr. Cates @ [facility]) off AC, LE DVT, KANDICE compliant w/ CPAP, Raynaud's, ex-smoker (quit 6 years ago) who presents to the ED c/o palpitations.  Found to have A-Flutter with RVR    #A-flutter w/ RVR  #s/p ablation hx A-Fib  -unclear trigger  -continue Cardizem gtt at current rate 15 mg/hr  -add PO Lopressor 25 bid   -start therapeutic Lovenox 150mg bid, chadsvasc 3  -monitor on telemetry  -plan for TOLU/ cardioversion tomorrow, npo after midnight  -consider EP For ablation if cardioversion fails to convert  -f/u with cardiology: Dr. Hartley  -f/u with TSH, ECHO, troponin, d-dimer, A1c, lipid profile    #Suspected pulmonary HTN  -as per pt on outpatient echo  -could explain pt's dyspnea on exertion  -f/u TTE  -pt takes sildenafil for Raynaud's     #Left leg wound with drainage  -sees Dr. Doyle, sepsis ruled out on admission  -burn consult for eval    #Raynaud's  -on sildenafil, can hold for now    #HTN, uncontrolled  -on metoprolol succinate 50 mg, Losartan 50 mg, HCTZ 12.5 mg  -can continue home meds, switch BB To lopressor while inpatient  -on cardizem gtt    #KANDICE  -continue CPAP use - 9 cm H2o      DVT ppx: therapeutic lovenox    Code: full

## 2023-06-07 NOTE — CONSULT NOTE ADULT - NS ATTEND AMEND GEN_ALL_CORE FT
complex patient  likely coarse AFIB, possibly flutter  recommend TOLU/DCCV and DOACs  outpatient f/u with EP Dr. Frazier

## 2023-06-07 NOTE — H&P ADULT - NSHPLABSRESULTS_GEN_ALL_CORE
12.6   7.34  )-----------( 271      ( 07 Jun 2023 12:16 )             39.6       06-07    141  |  101  |  16  ----------------------------<  113<H>  3.6   |  25  |  0.6<L>    Ca    9.3      07 Jun 2023 12:16  Mg     2.0     06-07    TPro  6.9  /  Alb  3.9  /  TBili  0.6  /  DBili  x   /  AST  27  /  ALT  20  /  AlkPhos  102  06-07                      Lactate Trend      CARDIAC MARKERS ( 07 Jun 2023 12:16 )  x     / <0.01 ng/mL / x     / x     / x            CAPILLARY BLOOD GLUCOSE

## 2023-06-07 NOTE — ED PROVIDER NOTE - PHYSICAL EXAMINATION
CONST: Well appearing in NAD  EYES: PERRL, EOMI, Sclera and conjunctiva clear.   ENT: No nasal discharge. Oropharynx normal appearing  NECK: Non-tender, no meningeal signs. normal ROM. supple   CARD: Normal S1 S2; Atrial fibrillation  RESP: Equal BS B/L, No wheezes, rhonchi or rales. No distress  GI: Soft, non-tender, non-distended. no cva tenderness. normal BS  MS: Normal ROM in all extremities. No midline spinal tenderness. pulses 2 +. no calf tenderness or swelling  SKIN: Warm, dry, no acute rashes. Good turgor  NEURO: A&Ox3, No focal deficits.

## 2023-06-07 NOTE — ED PROVIDER NOTE - NS ED ATTENDING STATEMENT MOD
Chief Complaint   Patient presents with   • Sore Throat   • Office Visit       HPI: Patient presents with Father  Father states sore throat started 2 days ago   No treatment for symptoms  Recent strep 3-4 weeks ago  Requesting Covid and Strep test  No known contact with illness     Review of Systems   All other systems reviewed and are negative.      Past Medical History:   Diagnosis Date   • Constipation        No past surgical history on file.    Family History   Problem Relation Age of Onset   • Diabetes Maternal Great-Grandfather    • Diabetes Maternal Great-Grandmother    • Cancer Maternal Uncle    • Cancer, Ovarian Maternal Grandmother        Social History     Socioeconomic History   • Marital status: Single     Spouse name: Not on file   • Number of children: Not on file   • Years of education: Not on file   • Highest education level: Not on file   Occupational History   • Not on file   Tobacco Use   • Smoking status: Never   • Smokeless tobacco: Never   • Tobacco comments:     lives in a non smoking home   Substance and Sexual Activity   • Alcohol use: Not on file   • Drug use: Not on file   • Sexual activity: Not on file   Other Topics Concern   • Not on file   Social History Narrative   • Not on file     Social Determinants of Health     Financial Resource Strain: Not on file   Food Insecurity: Not on file   Transportation Needs: Not on file   Physical Activity: Not on file   Stress: Not on file   Social Connections: Not on file   Intimate Partner Violence: Not on file       Current Outpatient Medications   Medication Sig Dispense Refill   • polyethylene glycol (MIRALAX) 17 GM/SCOOP powder Stir and dissolve 1/2 cap powder in any 4 to 8 ounces of beverage, then drink. 500 g 0     No current facility-administered medications for this visit.       ALLERGIES:  No Known Allergies    Immunization History   Administered Date(s) Administered   • DTaP(INFANRIX) 2014, 01/21/2015   • DTaP, 5 Pertussis Antigens  (DAPTACEL) 04/08/2015, 03/30/2016, 09/26/2018   • Hep A, ped/adol, 2 dose 10/07/2015, 03/30/2016   • Hep B, adolescent or pediatric 2014, 2014, 06/24/2015   • Hib (PRP-T) 2014, 01/21/2015, 04/08/2015, 12/30/2015   • Influenza, quadrivalent, preserve-free 10/07/2015, 11/04/2015, 11/15/2016, 12/14/2018, 11/05/2019, 11/19/2020, 11/18/2021, 10/18/2022   • MMR 12/30/2015, 09/26/2018   • Pneumococcal Conjugate 13 Valent Vacc (Prevnar 13) 2014, 01/21/2015, 04/08/2015, 10/07/2015   • Polio, INACTIVATED 2014, 01/21/2015, 06/24/2015, 09/26/2018   • Rotavirus - pentavalent 2014, 01/21/2015, 01/21/2015, 04/08/2015   • Varicella 10/07/2015, 09/26/2018       There were no vitals taken for this visit.    Physical Exam  Vitals reviewed.   Constitutional:       General: She is active.      Appearance: Normal appearance. She is well-developed and normal weight.   HENT:      Right Ear: Tympanic membrane, ear canal and external ear normal.      Left Ear: Tympanic membrane, ear canal and external ear normal.      Nose: Nose normal.      Mouth/Throat:      Pharynx: Posterior oropharyngeal erythema present.   Eyes:      Conjunctiva/sclera: Conjunctivae normal.      Pupils: Pupils are equal, round, and reactive to light.   Cardiovascular:      Rate and Rhythm: Normal rate and regular rhythm.      Pulses: Normal pulses.      Heart sounds: Normal heart sounds.   Pulmonary:      Effort: Pulmonary effort is normal.      Breath sounds: Normal breath sounds.   Lymphadenopathy:      Head:      Left side of head: Tonsillar adenopathy present.      Comments: Enlarged non tender node   Skin:     General: Skin is warm and dry.      Capillary Refill: Capillary refill takes less than 2 seconds.   Neurological:      Mental Status: She is alert.         PLAN:  Problem List Items Addressed This Visit    None  Visit Diagnoses     Sore throat    -  Primary    Relevant Orders    POCT RAPID STREP A           Sore  throat    - POCT RAPID STREP A          Carine Harper, CNP   This was a shared visit with the ELI. I reviewed and verified the documentation and independently performed the documented: I have personally provided the amount of critical care time documented below excluding time spent on separate procedures.

## 2023-06-07 NOTE — CONSULT NOTE ADULT - SUBJECTIVE AND OBJECTIVE BOX
Patient is a 48y old  Female who presents with a chief complaint of palpitations, dyspnea on exertion (07 Jun 2023 16:36)    HPI: Patient is a 48yFemale with PMH HTN, AF sp ablation 6 yrs ago at [facility] (stopped taking Xarelto) who presented to the hospital for palpitations and SOB. Found to be in aflutter 2:1 and given cardizem with minimal response. Feels better after receiving this. Never had aflutter before, but had afib 6 years ago and her previous cardiologist stopped her anticoagulation. Always feels when she goes into afib but hasnt happened for a prolonged period of time in some time.      PAST MEDICAL & SURGICAL HISTORY:  HTN (hypertension)      Afib      Back pain      History of laparoscopic cholecystectomy      H/O prior ablation treatment    PREVIOUS DIAGNOSTIC TESTING:      ECHO  FINDINGS:    STRESS  FINDINGS:    CATHETERIZATION  FINDINGS:    ELECTROPHYSIOLOGY STUDY  FINDINGS:    CAROTID ULTRASOUND:  FINDINGS    VENOUS DUPLEX SCAN:  FINDINGS:    CHEST CT PULMONARY ANGIO with IV Contrast:  FINDINGS:    MEDICATIONS  (STANDING):  atorvastatin 80 milliGRAM(s) Oral at bedtime  diltiazem Infusion 5 mG/Hr (5 mL/Hr) IV Continuous <Continuous>  hydrochlorothiazide 12.5 milliGRAM(s) Oral daily  losartan 50 milliGRAM(s) Oral daily  metoprolol tartrate 25 milliGRAM(s) Oral every 12 hours    MEDICATIONS  (PRN):  acetaminophen     Tablet .. 650 milliGRAM(s) Oral every 6 hours PRN Temp greater or equal to 38C (100.4F), Mild Pain (1 - 3)      FAMILY HISTORY: No significant hx    SOCIAL HISTORY: No smoking, ETOH or illicit drug use    Past Surgical History: see above    Allergies:  No Known Allergies      REVIEW OF SYSTEMS:  CONSTITUTIONAL: No fever, weight loss, chills, shakes, or fatigue  RESPIRATORY: No cough, wheezing, hemoptysis, + shortness of breath  CARDIOVASCULAR: +dyspnea, palpitations, No chest pain, dizziness, syncope, paroxysmal nocturnal dyspnea, orthopnea, or arm or leg swelling  GASTROINTESTINAL: No abdominal  or epigastric pain, nausea, vomiting, hematemesis, diarrhea, constipation, melena or bright red blood.  NEUROLOGICAL: No headaches, memory loss, slurred speech, limb weakness, loss of strength, numbness, or tremors  MUSCULOSKELETAL: No joint pain or swelling, muscle, back, or extremity pain      Vital Signs Last 24 Hrs  T(C): 36.9 (07 Jun 2023 12:01), Max: 36.9 (07 Jun 2023 12:01)  T(F): 98.5 (07 Jun 2023 12:01), Max: 98.5 (07 Jun 2023 12:01)  HR: 144 (07 Jun 2023 16:58) (135 - 161)  BP: 130/92 (07 Jun 2023 16:33) (130/92 - 188/107)  BP(mean): --  RR: 18 (07 Jun 2023 16:58) (18 - 20)  SpO2: 97% (07 Jun 2023 16:58) (94% - 98%)    Parameters below as of 07 Jun 2023 16:58  Patient On (Oxygen Delivery Method): room air        PHYSICAL EXAM:  GENERAL: In no apparent distress, well nourished, and hydrated.  NECK: Supple, No JVD   HEART: Irregular rate and rhythm; No murmurs, rubs, or gallops.  PULMONARY: Clear to auscultation and perfusion.  No rales, wheezing, or rhonchi bilaterally.  EXTREMITIES:  2+ Peripheral Pulses, no LE edema BL  NEUROLOGICAL: Grossly nonfocal      INTERPRETATION OF TELEMETRY:  bpm    ECG:    I&O's Detail      LABS:                        12.6   7.34  )-----------( 271      ( 07 Jun 2023 12:16 )             39.6     06-07    141  |  101  |  16  ----------------------------<  113<H>  3.6   |  25  |  0.6<L>    Ca    9.3      07 Jun 2023 12:16  Mg     2.0     06-07    TPro  6.9  /  Alb  3.9  /  TBili  0.6  /  DBili  x   /  AST  27  /  ALT  20  /  AlkPhos  102  06-07    CARDIAC MARKERS ( 07 Jun 2023 12:16 )  x     / <0.01 ng/mL / x     / x     / x              BNP  I&O's Detail    Daily Height in cm: 167.64 (07 Jun 2023 12:01)    Daily     RADIOLOGY & ADDITIONAL STUDIES:
Date of Admission: 6/7/23    CHIEF COMPLAINT: palpitations, shortness of breath    HISTORY OF PRESENT ILLNESS: 48yFemale with PMH below presented to the hospital for above CC. Found to be in aflutter 2:1 and given cardizem with minimal response. Feels better after receiving this. Never had aflutter before, but had afib 6 years ago and her previous cardiologist stopped her anticoagulation. Always feels when she goes into afib but hasnt happened for a prolonged period of time in some time.    PAST MEDICAL & SURGICAL HISTORY:  HTN (hypertension)      Afib      Back pain      History of laparoscopic cholecystectomy      H/O prior ablation treatment          FAMILY HISTORY:  [ ] no pertinent family history of premature cardiovascular disease in first degree relatives.  Mother:   Father:   Siblings:     SOCIAL HISTORY:    [ ] Non-smoker  [ ] Smoker  [ ] Alcohol    Allergies    No Known Allergies    Intolerances    	    REVIEW OF SYSTEMS:  CONSTITUTIONAL: No fever, weight loss, or fatigue  CARDIOLOGY: see HPI  RESPIRATORY: see HPI  NEUROLOGICAL: NO weakness, no focal deficits to report.  ENDOCRINOLOGICAL: no recent change in diabetic medications.   GI: no BRBPR, no N,V,diarrhea.    PSYCHIATRY: normal mood and affect  HEENT: no nasal discharge, no ecchymosis  SKIN: no ecchymosis, no breakdown  MUSCULOSKELETAL: Full range of motion x4.     PHYSICAL EXAM:  T(C): 36.9 (06-07-23 @ 12:01), Max: 36.9 (06-07-23 @ 12:01)  HR: 135 (06-07-23 @ 16:33) (135 - 161)  BP: 130/92 (06-07-23 @ 16:33) (130/92 - 188/107)  RR: 18 (06-07-23 @ 16:33) (18 - 20)  SpO2: 98% (06-07-23 @ 16:33) (94% - 98%)  Wt(kg): --  I&O's Summary      General Appearance: well appearing, normal for age and gender. 	  Neck: normal JVP, no bruit.   Eyes: No xanthomalasia, Extra Ocular muscles intact.   Cardiovascular: irregular rate and rhythm S1 S2, No JVD, No murmurs, chronic lymphedema  Respiratory: Lungs clear to auscultation	  Psychiatry: Alert and oriented x 3, Mood & affect appropriate  Gastrointestinal:  Soft, Non-tender  Skin/Integumen: No rashes, No ecchymoses, No cyanosis	  Neurologic: Non-focal  Musculoskeletal/ extremities: Normal range of motion, No clubbing, cyanosis chronic lymphedema  Vascular: Peripheral pulses palpable 2+ bilaterally    LABS:	 	                          12.6   7.34  )-----------( 271      ( 07 Jun 2023 12:16 )             39.6     06-07    141  |  101  |  16  ----------------------------<  113<H>  3.6   |  25  |  0.6<L>    Ca    9.3      07 Jun 2023 12:16  Mg     2.0     06-07    TPro  6.9  /  Alb  3.9  /  TBili  0.6  /  DBili  x   /  AST  27  /  ALT  20  /  AlkPhos  102  06-07    CARDIAC MARKERS ( 07 Jun 2023 12:16 )  x     / <0.01 ng/mL / x     / x     / x              CARDIAC MARKERS:            TELEMETRY EVENTS: 	    ECG:  aflutter at 154 bpm 2:1	  RADIOLOGY:  OTHER: 	    PREVIOUS DIAGNOSTIC TESTING:    [ x] Echocardiogram: 5/6/23--> normal Ef 55-60%, mild MR with thickened leaflets, enlarged RV with decreased function. moderate TR moderate pulmonary hypertension. RVSP estimated at 55mmHg. trace PI.   [ ]  Catheterization:  [ ] Stress Test:  	  	    Home Medications:  hydroCHLOROthiazide 12.5 mg oral tablet: 1 orally once a day (07 Jun 2023 15:46)  Lipitor 80 mg oral tablet: 1 orally once a day (at bedtime) (07 Jun 2023 15:46)  losartan-hydrochlorothiazide 50mg-12.5mg oral tablet: 1 tab(s) orally once a day (19 Mar 2019 10:43)  marijuana: mdicinal for back and knee pain (19 Mar 2019 10:43)  metoprolol succinate 50 mg oral tablet, extended release: 1 orally once a day (07 Jun 2023 15:46)  sildenafil 20 mg oral tablet: 1 orally 3 times a day (07 Jun 2023 15:45)    MEDICATIONS  (STANDING):  atorvastatin 80 milliGRAM(s) Oral at bedtime  diltiazem Infusion 5 mG/Hr (5 mL/Hr) IV Continuous <Continuous>  hydrochlorothiazide 12.5 milliGRAM(s) Oral daily  losartan 50 milliGRAM(s) Oral daily  metoprolol tartrate 25 milliGRAM(s) Oral every 12 hours    MEDICATIONS  (PRN):  acetaminophen     Tablet .. 650 milliGRAM(s) Oral every 6 hours PRN Temp greater or equal to 38C (100.4F), Mild Pain (1 - 3)

## 2023-06-07 NOTE — ED PROVIDER NOTE - CLINICAL SUMMARY MEDICAL DECISION MAKING FREE TEXT BOX
48yF afib s/p ablation not on a/c p/w palpitations.  Pt tachycardic and hypertensive on ED arrival but awake, alert, stable and w/o resp distress.  EKG w/o ischemia.  CXR w/ cardiomegaly, partially due to AP film, but also likely due to underlying chronic disease (pulm HTN?).  Labs reassuring.  Pt responsive to diltiazem but requires repeat doses for sustained effect, so gtt started.  D/w cardiology and will adm to tele for further care.

## 2023-06-07 NOTE — ED PROVIDER NOTE - DIFFERENTIAL DIAGNOSIS
afib with RVR 2/2 failed ablation vs missed meds, sepsis, cardiac event, med side effect Differential Diagnosis

## 2023-06-07 NOTE — ED PROVIDER NOTE - OBJECTIVE STATEMENT
48 year old female with pmhx of afib s/p ablation in 2016, pulm htn, shannan, dvt, ex smoker presents to ed with palpitations and lightheadedness since last night. Pt denies chest pain, sob, abd pain or nausea, vomiting, diarrhea.

## 2023-06-07 NOTE — H&P ADULT - ATTENDING COMMENTS
patient seen and examined at bedside independently of medical resident and agree with the h/p unless otherwise stated     Vital Signs Last 24 Hrs  T(C): 36.9 (07 Jun 2023 12:01), Max: 36.9 (07 Jun 2023 12:01)  T(F): 98.5 (07 Jun 2023 12:01), Max: 98.5 (07 Jun 2023 12:01)  HR: 144 (07 Jun 2023 16:58) (135 - 161)  BP: 130/92 (07 Jun 2023 16:33) (130/92 - 188/107)  RR: 18 (07 Jun 2023 16:58) (18 - 20)  SpO2: 97% (07 Jun 2023 16:58) (94% - 98%)    Parameters below as of 07 Jun 2023 16:58  Patient On (Oxygen Delivery Method): room air                          12.6   7.34  )-----------( 271      ( 07 Jun 2023 12:16 )             39.6   06-07    141  |  101  |  16  ----------------------------<  113<H>  3.6   |  25  |  0.6<L>    Ca    9.3      07 Jun 2023 12:16  Mg     2.0     06-07    TPro  6.9  /  Alb  3.9  /  TBili  0.6  /  DBili  x   /  AST  27  /  ALT  20  /  AlkPhos  102  06-07    # Atrial Flutter with RVR   # Morbidly Obese BMI > 50  # Chronic Lymphedema   # HTN     -admitted to telemetry; Cardizem drip at 15mg/hr - still tachycardiac in 130s - may need to be upgraded to CCU (discussed with admitting resident) - BB dose given - monitor HR and if still uncontrolled; discuss with cardiac fellow on call for the upgrade approval (unstable for regular tele floor)  -home maintenance meds   -Lovenox dose given - switch to oral Eliquis 5mg twice daily   -NPO past midnight for cardioversion tomorrow - see cardio note   -patient understands her current admission plan of care -  at bedside - agreeable for inpatient monitoring including procedures     Attending Physician Dr. Mima Manuel #[id number] patient seen and examined at bedside independently of medical resident and agree with the h/p unless otherwise stated     Vital Signs Last 24 Hrs  T(C): 36.9 (07 Jun 2023 12:01), Max: 36.9 (07 Jun 2023 12:01)  T(F): 98.5 (07 Jun 2023 12:01), Max: 98.5 (07 Jun 2023 12:01)  HR: 144 (07 Jun 2023 16:58) (135 - 161)  BP: 130/92 (07 Jun 2023 16:33) (130/92 - 188/107)  RR: 18 (07 Jun 2023 16:58) (18 - 20)  SpO2: 97% (07 Jun 2023 16:58) (94% - 98%)    Parameters below as of 07 Jun 2023 16:58  Patient On (Oxygen Delivery Method): room air    PHYSICAL EXAM:  GENERAL: not in distress and speaking in full sentences -  at bedside   HEAD:  Atraumatic, Normocephalic  EYES: EOMI, PERRLA, conjunctiva and sclera clear  NERVOUS SYSTEM:  Alert & Oriented X 4, Good concentration  CHEST/LUNG: Clear b/l  HEART: Regular rate and rhythm; No murmurs, rubs, or gallops  ABDOMEN: Soft and obese abdomen   EXTREMITIES:  moves all extrem -   SKIN: chronic skin changes / tight stockings noted LE b/l                        12.6   7.34  )-----------( 271      ( 07 Jun 2023 12:16 )             39.6   06-07    141  |  101  |  16  ----------------------------<  113<H>  3.6   |  25  |  0.6<L>    Ca    9.3      07 Jun 2023 12:16  Mg     2.0     06-07    TPro  6.9  /  Alb  3.9  /  TBili  0.6  /  DBili  x   /  AST  27  /  ALT  20  /  AlkPhos  102  06-07    # Atrial Flutter with RVR   # Morbidly Obese BMI > 50  # Chronic Lymphedema   # HTN     -admitted to telemetry; Cardizem drip at 15mg/hr - still tachycardiac in 130s - may need to be upgraded to CCU (discussed with admitting resident) - BB dose given - monitor HR and if still uncontrolled; discuss with cardiac fellow on call for the upgrade approval (unstable for regular tele floor)  -check Thyroid panel (cardio ok to hold off on repeating echo)   -home maintenance meds   -Lovenox dose given - switch to oral Eliquis 5mg twice daily   -NPO past midnight for cardioversion tomorrow - see cardio note   -patient understands her current admission plan of care -  at bedside - agreeable for inpatient monitoring including procedures     Attending Physician Dr. Mima Manuel #[id number] patient seen and examined at bedside independently of medical resident and agree with the h/p unless otherwise stated     Vital Signs Last 24 Hrs  T(C): 36.9 (07 Jun 2023 12:01), Max: 36.9 (07 Jun 2023 12:01)  T(F): 98.5 (07 Jun 2023 12:01), Max: 98.5 (07 Jun 2023 12:01)  HR: 144 (07 Jun 2023 16:58) (135 - 161)  BP: 130/92 (07 Jun 2023 16:33) (130/92 - 188/107)  RR: 18 (07 Jun 2023 16:58) (18 - 20)  SpO2: 97% (07 Jun 2023 16:58) (94% - 98%)    Parameters below as of 07 Jun 2023 16:58  Patient On (Oxygen Delivery Method): room air    PHYSICAL EXAM:  GENERAL: not in distress and speaking in full sentences -  at bedside   HEAD:  Atraumatic, Normocephalic  EYES: EOMI, PERRLA, conjunctiva and sclera clear  NERVOUS SYSTEM:  Alert & Oriented X 4, Good concentration  CHEST/LUNG: Clear b/l  HEART: Regular rate and rhythm; No murmurs, rubs, or gallops  ABDOMEN: Soft and obese abdomen   EXTREMITIES:  moves all extrem   SKIN: chronic skin changes / tight stockings noted LE b/l                        12.6   7.34  )-----------( 271      ( 07 Jun 2023 12:16 )             39.6   06-07    141  |  101  |  16  ----------------------------<  113<H>  3.6   |  25  |  0.6<L>    Ca    9.3      07 Jun 2023 12:16  Mg     2.0     06-07    TPro  6.9  /  Alb  3.9  /  TBili  0.6  /  DBili  x   /  AST  27  /  ALT  20  /  AlkPhos  102  06-07    # Atrial Flutter with RVR   # Morbidly Obese BMI > 50  # Chronic Lymphedema   # HTN     -admitted to telemetry; Cardizem drip at 15mg/hr - still tachycardiac in 130s - may need to be upgraded to CCU (discussed with admitting resident) - BB dose given - monitor HR and if still uncontrolled; discuss with cardiac fellow on call for the upgrade approval (unstable for regular tele floor)  -check Thyroid panel (cardio ok to hold off on repeating echo)   -home maintenance meds   -Lovenox dose given - switch to oral Eliquis 5mg twice daily   -NPO past midnight for cardioversion tomorrow - see cardio note   -patient understands her current admission plan of care -  at bedside - agreeable for inpatient monitoring including procedures     Attending Physician Dr. Mima Manuel #[id number] patient seen and examined at bedside independently of medical resident and agree with the h/p unless otherwise stated     Vital Signs Last 24 Hrs  T(C): 36.9 (07 Jun 2023 12:01), Max: 36.9 (07 Jun 2023 12:01)  T(F): 98.5 (07 Jun 2023 12:01), Max: 98.5 (07 Jun 2023 12:01)  HR: 144 (07 Jun 2023 16:58) (135 - 161)  BP: 130/92 (07 Jun 2023 16:33) (130/92 - 188/107)  RR: 18 (07 Jun 2023 16:58) (18 - 20)  SpO2: 97% (07 Jun 2023 16:58) (94% - 98%)    Parameters below as of 07 Jun 2023 16:58  Patient On (Oxygen Delivery Method): room air    PHYSICAL EXAM:  GENERAL: not in distress and speaking in full sentences -  at bedside   HEAD:  Atraumatic, Normocephalic  EYES: EOMI, PERRLA, conjunctiva and sclera clear  NERVOUS SYSTEM:  Alert & Oriented X 4, Good concentration  CHEST/LUNG: Clear b/l  HEART: irregular rhythm, tachycardiac   ABDOMEN: Soft and obese abdomen   EXTREMITIES:  moves all extrem   SKIN: chronic skin changes / tight stockings noted LE b/l                        12.6   7.34  )-----------( 271      ( 07 Jun 2023 12:16 )             39.6   06-07    141  |  101  |  16  ----------------------------<  113<H>  3.6   |  25  |  0.6<L>    Ca    9.3      07 Jun 2023 12:16  Mg     2.0     06-07    TPro  6.9  /  Alb  3.9  /  TBili  0.6  /  DBili  x   /  AST  27  /  ALT  20  /  AlkPhos  102  06-07    # Atrial Flutter with RVR   # Morbidly Obese BMI > 50  # Chronic Lymphedema   # HTN     -admitted to telemetry; Cardizem drip at 15mg/hr - still tachycardiac in 130s - may need to be upgraded to CCU (discussed with admitting resident) - BB dose given - monitor HR and if still uncontrolled; discuss with cardiac fellow on call for the upgrade approval (unstable for regular tele floor)  -check Thyroid panel (cardio ok to hold off on repeating echo)   -home maintenance meds   -Lovenox dose given - switch to oral Eliquis 5mg twice daily   -NPO past midnight for cardioversion tomorrow - see cardio note   -patient understands her current admission plan of care -  at bedside - agreeable for inpatient monitoring including procedures     Attending Physician Dr. Mima Manuel #[id number]

## 2023-06-08 ENCOUNTER — TRANSCRIPTION ENCOUNTER (OUTPATIENT)
Age: 48
End: 2023-06-08

## 2023-06-08 VITALS
RESPIRATION RATE: 18 BRPM | DIASTOLIC BLOOD PRESSURE: 81 MMHG | SYSTOLIC BLOOD PRESSURE: 167 MMHG | HEART RATE: 90 BPM | OXYGEN SATURATION: 93 %

## 2023-06-08 LAB
A1C WITH ESTIMATED AVERAGE GLUCOSE RESULT: 5.9 % — HIGH (ref 4–5.6)
ALBUMIN SERPL ELPH-MCNC: 3.9 G/DL — SIGNIFICANT CHANGE UP (ref 3.5–5.2)
ALP SERPL-CCNC: 91 U/L — SIGNIFICANT CHANGE UP (ref 30–115)
ALT FLD-CCNC: 18 U/L — SIGNIFICANT CHANGE UP (ref 0–41)
ANION GAP SERPL CALC-SCNC: 12 MMOL/L — SIGNIFICANT CHANGE UP (ref 7–14)
AST SERPL-CCNC: 25 U/L — SIGNIFICANT CHANGE UP (ref 0–41)
BASOPHILS # BLD AUTO: 0.04 K/UL — SIGNIFICANT CHANGE UP (ref 0–0.2)
BASOPHILS NFR BLD AUTO: 0.7 % — SIGNIFICANT CHANGE UP (ref 0–1)
BILIRUB SERPL-MCNC: 0.7 MG/DL — SIGNIFICANT CHANGE UP (ref 0.2–1.2)
BUN SERPL-MCNC: 17 MG/DL — SIGNIFICANT CHANGE UP (ref 10–20)
CALCIUM SERPL-MCNC: 9.4 MG/DL — SIGNIFICANT CHANGE UP (ref 8.4–10.4)
CHLORIDE SERPL-SCNC: 104 MMOL/L — SIGNIFICANT CHANGE UP (ref 98–110)
CHOLEST SERPL-MCNC: 164 MG/DL — SIGNIFICANT CHANGE UP
CO2 SERPL-SCNC: 26 MMOL/L — SIGNIFICANT CHANGE UP (ref 17–32)
CREAT SERPL-MCNC: 0.6 MG/DL — LOW (ref 0.7–1.5)
D DIMER BLD IA.RAPID-MCNC: 555 NG/ML DDU — HIGH
EGFR: 109 ML/MIN/1.73M2 — SIGNIFICANT CHANGE UP
EOSINOPHIL # BLD AUTO: 0.14 K/UL — SIGNIFICANT CHANGE UP (ref 0–0.7)
EOSINOPHIL NFR BLD AUTO: 2.3 % — SIGNIFICANT CHANGE UP (ref 0–8)
ESTIMATED AVERAGE GLUCOSE: 123 MG/DL — HIGH (ref 68–114)
GLUCOSE SERPL-MCNC: 119 MG/DL — HIGH (ref 70–99)
HCT VFR BLD CALC: 38 % — SIGNIFICANT CHANGE UP (ref 37–47)
HDLC SERPL-MCNC: 53 MG/DL — SIGNIFICANT CHANGE UP
HGB BLD-MCNC: 11.8 G/DL — LOW (ref 12–16)
IMM GRANULOCYTES NFR BLD AUTO: 0.3 % — SIGNIFICANT CHANGE UP (ref 0.1–0.3)
LIPID PNL WITH DIRECT LDL SERPL: 99 MG/DL — SIGNIFICANT CHANGE UP
LYMPHOCYTES # BLD AUTO: 1 K/UL — LOW (ref 1.2–3.4)
LYMPHOCYTES # BLD AUTO: 16.5 % — LOW (ref 20.5–51.1)
MAGNESIUM SERPL-MCNC: 2 MG/DL — SIGNIFICANT CHANGE UP (ref 1.8–2.4)
MCHC RBC-ENTMCNC: 27.9 PG — SIGNIFICANT CHANGE UP (ref 27–31)
MCHC RBC-ENTMCNC: 31.1 G/DL — LOW (ref 32–37)
MCV RBC AUTO: 89.8 FL — SIGNIFICANT CHANGE UP (ref 81–99)
MONOCYTES # BLD AUTO: 0.92 K/UL — HIGH (ref 0.1–0.6)
MONOCYTES NFR BLD AUTO: 15.2 % — HIGH (ref 1.7–9.3)
NEUTROPHILS # BLD AUTO: 3.93 K/UL — SIGNIFICANT CHANGE UP (ref 1.4–6.5)
NEUTROPHILS NFR BLD AUTO: 65 % — SIGNIFICANT CHANGE UP (ref 42.2–75.2)
NON HDL CHOLESTEROL: 111 MG/DL — SIGNIFICANT CHANGE UP
NRBC # BLD: 0 /100 WBCS — SIGNIFICANT CHANGE UP (ref 0–0)
PLATELET # BLD AUTO: 263 K/UL — SIGNIFICANT CHANGE UP (ref 130–400)
PMV BLD: 10.1 FL — SIGNIFICANT CHANGE UP (ref 7.4–10.4)
POTASSIUM SERPL-MCNC: 3.7 MMOL/L — SIGNIFICANT CHANGE UP (ref 3.5–5)
POTASSIUM SERPL-SCNC: 3.7 MMOL/L — SIGNIFICANT CHANGE UP (ref 3.5–5)
PROT SERPL-MCNC: 6.8 G/DL — SIGNIFICANT CHANGE UP (ref 6–8)
RBC # BLD: 4.23 M/UL — SIGNIFICANT CHANGE UP (ref 4.2–5.4)
RBC # FLD: 16.1 % — HIGH (ref 11.5–14.5)
SODIUM SERPL-SCNC: 142 MMOL/L — SIGNIFICANT CHANGE UP (ref 135–146)
TRIGL SERPL-MCNC: 57 MG/DL — SIGNIFICANT CHANGE UP
TROPONIN T SERPL-MCNC: <0.01 NG/ML — SIGNIFICANT CHANGE UP
TSH SERPL-MCNC: 0.28 UIU/ML — SIGNIFICANT CHANGE UP (ref 0.27–4.2)
WBC # BLD: 6.05 K/UL — SIGNIFICANT CHANGE UP (ref 4.8–10.8)
WBC # FLD AUTO: 6.05 K/UL — SIGNIFICANT CHANGE UP (ref 4.8–10.8)

## 2023-06-08 PROCEDURE — 99239 HOSP IP/OBS DSCHRG MGMT >30: CPT

## 2023-06-08 PROCEDURE — 99231 SBSQ HOSP IP/OBS SF/LOW 25: CPT | Mod: GC

## 2023-06-08 RX ORDER — MORPHINE SULFATE 50 MG/1
2 CAPSULE, EXTENDED RELEASE ORAL ONCE
Refills: 0 | Status: DISCONTINUED | OUTPATIENT
Start: 2023-06-08 | End: 2023-06-08

## 2023-06-08 RX ORDER — COLLAGENASE CLOSTRIDIUM HIST. 250 UNIT/G
1 OINTMENT (GRAM) TOPICAL
Refills: 0 | Status: DISCONTINUED | OUTPATIENT
Start: 2023-06-08 | End: 2023-06-08

## 2023-06-08 RX ORDER — DILTIAZEM HCL 120 MG
10 CAPSULE, EXT RELEASE 24 HR ORAL
Qty: 125 | Refills: 0 | Status: DISCONTINUED | OUTPATIENT
Start: 2023-06-08 | End: 2023-06-08

## 2023-06-08 RX ORDER — FUROSEMIDE 40 MG
40 TABLET ORAL DAILY
Refills: 0 | Status: DISCONTINUED | OUTPATIENT
Start: 2023-06-08 | End: 2023-06-08

## 2023-06-08 RX ORDER — DILTIAZEM HCL 120 MG
1 CAPSULE, EXT RELEASE 24 HR ORAL
Qty: 30 | Refills: 0
Start: 2023-06-08 | End: 2023-07-07

## 2023-06-08 RX ORDER — LOSARTAN/HYDROCHLOROTHIAZIDE 100MG-25MG
1 TABLET ORAL
Qty: 0 | Refills: 0 | DISCHARGE

## 2023-06-08 RX ORDER — DILTIAZEM HCL 120 MG
240 CAPSULE, EXT RELEASE 24 HR ORAL DAILY
Refills: 0 | Status: DISCONTINUED | OUTPATIENT
Start: 2023-06-08 | End: 2023-06-08

## 2023-06-08 RX ORDER — APIXABAN 2.5 MG/1
1 TABLET, FILM COATED ORAL
Qty: 60 | Refills: 0
Start: 2023-06-08 | End: 2023-07-07

## 2023-06-08 RX ORDER — FUROSEMIDE 40 MG
1 TABLET ORAL
Qty: 30 | Refills: 0
Start: 2023-06-08 | End: 2023-07-07

## 2023-06-08 RX ORDER — APIXABAN 2.5 MG/1
1 TABLET, FILM COATED ORAL
Qty: 0 | Refills: 0 | DISCHARGE
Start: 2023-06-08

## 2023-06-08 RX ORDER — DILTIAZEM HCL 120 MG
1 CAPSULE, EXT RELEASE 24 HR ORAL
Qty: 0 | Refills: 0 | DISCHARGE
Start: 2023-06-08

## 2023-06-08 RX ORDER — HYDROCHLOROTHIAZIDE 25 MG
1 TABLET ORAL
Refills: 0 | DISCHARGE

## 2023-06-08 RX ADMIN — Medication 10 MG/HR: at 02:40

## 2023-06-08 RX ADMIN — Medication 650 MILLIGRAM(S): at 05:51

## 2023-06-08 RX ADMIN — Medication 10 MG/HR: at 09:00

## 2023-06-08 RX ADMIN — MORPHINE SULFATE 2 MILLIGRAM(S): 50 CAPSULE, EXTENDED RELEASE ORAL at 09:12

## 2023-06-08 RX ADMIN — MORPHINE SULFATE 2 MILLIGRAM(S): 50 CAPSULE, EXTENDED RELEASE ORAL at 08:24

## 2023-06-08 RX ADMIN — Medication 650 MILLIGRAM(S): at 06:21

## 2023-06-08 RX ADMIN — Medication 25 MILLIGRAM(S): at 05:51

## 2023-06-08 RX ADMIN — LOSARTAN POTASSIUM 50 MILLIGRAM(S): 100 TABLET, FILM COATED ORAL at 05:51

## 2023-06-08 RX ADMIN — Medication 40 MILLIGRAM(S): at 13:35

## 2023-06-08 RX ADMIN — Medication 240 MILLIGRAM(S): at 12:53

## 2023-06-08 NOTE — PROGRESS NOTE ADULT - ASSESSMENT
47yo female with history of HTN, A-Fib s/p ablation (Dr. Cates @ [facility]) off AC, LE DVT, KANDICE compliant w/ CPAP, Raynaud's, ex-smoker (quit 6 years ago) who presents to the ED c/o palpitations.     She also endorses chronic left posterior calf wound for which she sees Dr. Doyle, noticed increased drainage recently similar to prior infections and has been on multiple antibiotics for it.    Atrial flutter with RVR  HTN  H/O chronic LE DVT  KANDICE on CPAP  L leg wound 47yo female with history of HTN, A-Fib s/p ablation (Dr. Cates @ [facility]) off AC, LE DVT, KANDICE compliant w/ CPAP, Raynaud's, ex-smoker (quit 6 years ago) who presents to the ED c/o palpitations.     She also endorses chronic left posterior calf wound for which she sees Dr. Doyle, noticed increased drainage recently similar to prior infections and has been on multiple antibiotics for it.    PAF/Flutter  HTN  H/O chronic LE DVT  KANDICE on CPAP  Lymphedema and L leg wound  Morbid obesity               PLAN:    ·	Tele reviewed. Pt converted back to NSR  ·	Start her on Cardizem  mg po daily, firs dose now and stop the infusion half an hour later.   ·	Cont Metoprolol 25 mg po q 12h  ·	Cont Eliquis 5 mg po q 12h  ·	EP and cardiology eval noted. Plan was DCCV but pt converted back to NSR  ·	Local wound care of leg wounds.   ·	D/C home today  ·	Care d/w the pt's cardiologist    * Med rec reviewed. Plan of care d/w the pt. Time spent 40 minutes.  49yo female with history of HTN, A-Fib s/p ablation (Dr. Cates @ [facility]) off AC, LE DVT, KANDICE compliant w/ CPAP, Raynaud's, ex-smoker (quit 6 years ago) who presents to the ED c/o palpitations.     She also endorses chronic left posterior calf wound for which she sees Dr. Doyle, noticed increased drainage recently similar to prior infections and has been on multiple antibiotics for it.    PAF/Flutter  HTN  H/O chronic LE DVT  KANDICE on CPAP  Lymphedema and L leg wound  Morbid obesity  Moderate pulmonary HTN               PLAN:    ·	Tele reviewed. Pt converted back to NSR  ·	Start her on Cardizem  mg po daily, firs dose now and stop the infusion half an hour later.   ·	Cont Metoprolol 25 mg po q 12h  ·	Cont Eliquis 5 mg po q 12h  ·	EP and cardiology eval noted. Plan was DCCV but pt converted back to NSR  ·	Local wound care of leg wounds.   ·	D/C home today  ·	Care d/w the pt's cardiologist. D/C Losartan/HCTZ. Start her on Lasix 40 mg po daily    * Med rec reviewed. Plan of care d/w the pt. Time spent 40 minutes.

## 2023-06-08 NOTE — PROGRESS NOTE ADULT - SUBJECTIVE AND OBJECTIVE BOX
48y Female  HPI:This is a 49yo female with history of HTN, A-Fib s/p ablation (Dr. Cates @ [facility]) off AC, LE DVT, KANDICE compliant w/ CPAP, Raynaud's, ex-smoker (quit 6 years ago) who presents to the ED c/o palpitations.  Palpitations intermittent since a month ago, constant since last night around 6pm which prompted her visit here. She reports associated dyspnea on exertion and intermittent dizziness for the past month as well. Denies chest pain, leg edema, orthopnea, syncope, nausea, vomiting, diaphoresis, fevers, recent illness, urinary symptoms.   She also endorses chronic left posterior calf wound for which she sees Dr. Doyle, noticed increased drainage recently similar to prior infections and has been on multiple antibiotics for it.  Her cardiologist is Dr. Hartley. Recently had an outpatient ECHO a month ago which showed evidence of pulmonary HTN otherwise normal.     In ED,   T(F): 98.5 (06-07-23 @ 12:01), Max: 98.5 (06-07-23 @ 12:01)  HR: 144 (06-07-23 @ 15:38) (136 - 161)  BP: 156/101 (06-07-23 @ 14:58) (134/96 - 188/107)  RR: 18 (06-07-23 @ 15:38) (18 - 20)  SpO2: 97% (06-07-23 @ 15:38) (94% - 97%)  EKG and monitor showing A-Flutter w/ RVR. Started on Cardizem gtt HR improved 160s -> 120s.   Rest of labs including troponin unremarkable.   Admitted to telemetry for A-Flutter planned for cardioversion tomorrow.  (07 Jun 2023 15:50)    Allergies    No Known Allergies    Intolerances      PAST MEDICAL & SURGICAL HISTORY:  HTN (hypertension)      Afib      Back pain      History of laparoscopic cholecystectomy      H/O prior ablation treatment    EXAM:   NAD   CVS: afib, rate controlled; lungs CTa b/l   LLE: left leg wound measures [id number] w/ granulation tissue

## 2023-06-08 NOTE — PROGRESS NOTE ADULT - ASSESSMENT
Ass/ Rec:  left leg wound measures [id number] w/ granulation tissue; f/u culture   Infected 3%  Wound care - santly, xeroform, dressings  IV abx as indicated; f/u ID as needed  Surgical debridement poss next week   Elevation and compression   Will follow

## 2023-06-08 NOTE — DISCHARGE NOTE PROVIDER - HOSPITAL COURSE
47yo female with history of HTN, A-Fib s/p ablation (Dr. Cates @ [facility]) off AC, LE DVT, KANDICE compliant w/ CPAP, Raynaud's, ex-smoker (quit 6 years ago) who presents to the ED c/o palpitations.  Palpitations intermittent since a month ago, constant since last night around 6pm which prompted her visit here. She reports associated dyspnea on exertion and intermittent dizziness for the past month as well. Denies chest pain, leg edema, orthopnea, syncope, nausea, vomiting, diaphoresis, fevers, recent illness, urinary symptoms.   She also endorses chronic left posterior calf wound for which she sees Dr. Doyle, noticed increased drainage recently similar to prior infections and has been on multiple antibiotics for it.  Her cardiologist is Dr. Hartley. Recently had an outpatient ECHO a month ago which showed evidence of pulmonary HTN otherwise normal.     In ED,   T(F): 98.5 (06-07-23 @ 12:01), Max: 98.5 (06-07-23 @ 12:01)  HR: 144 (06-07-23 @ 15:38) (136 - 161)  BP: 156/101 (06-07-23 @ 14:58) (134/96 - 188/107)  RR: 18 (06-07-23 @ 15:38) (18 - 20)  SpO2: 97% (06-07-23 @ 15:38) (94% - 97%)  EKG and monitor showing A-Flutter w/ RVR. Started on Cardizem gtt HR improved 160s -> 120s.   Rest of labs including troponin unremarkable.   Admitted to telemetry for A-Flutter planned for cardioversion today however patient converted to NSR prior to the cardioversion.     Patient is stable for dc at this time   47yo female with history of HTN, A-Fib s/p ablation (Dr. Cates @ [facility]) off AC, LE DVT, KANDICE compliant w/ CPAP, Raynaud's, ex-smoker (quit 6 years ago) who presents to the ED c/o palpitations.     She also endorses chronic left posterior calf wound for which she sees Dr. Doyle, noticed increased drainage recently similar to prior infections and has been on multiple antibiotics for it.    PAF/Flutter  HTN  H/O chronic LE DVT  KANDICE on CPAP  Lymphedema and L leg wound  Morbid obesity  Moderate pulmonary HTN               PLAN:    Tele reviewed. Pt converted back to NSR  Start her on Cardizem  mg po daily, firs dose now and stop the infusion half an hour later.   Cont Metoprolol 25 mg po q 12h  Cont Eliquis 5 mg po q 12h  EP and cardiology eval noted. Plan was DCCV but pt converted back to NSR  Local wound care of leg wounds. Augementin 10 day course and f/u Dr. Doyle burn service op   D/C home today  Care d/w the pt's cardiologist. D/C Losartan/HCTZ. Start her on Lasix 40 mg po daily      49yo female with history of HTN, A-Fib s/p ablation (Dr. Cates @ [facility]) off AC, LE DVT, KANDICE compliant w/ CPAP, Raynaud's, ex-smoker (quit 6 years ago) who presents to the ED c/o palpitations.  Palpitations intermittent since a month ago, constant since last night around 6pm which prompted her visit here. She reports associated dyspnea on exertion and intermittent dizziness for the past month as well. Denies chest pain, leg edema, orthopnea, syncope, nausea, vomiting, diaphoresis, fevers, recent illness, urinary symptoms.   She also endorses chronic left posterior calf wound for which she sees Dr. Doyle, noticed increased drainage recently similar to prior infections and has been on multiple antibiotics for it.  Her cardiologist is Dr. Hartley. Recently had an outpatient ECHO a month ago which showed evidence of pulmonary HTN otherwise normal.     In ED,   T(F): 98.5 (06-07-23 @ 12:01), Max: 98.5 (06-07-23 @ 12:01)  HR: 144 (06-07-23 @ 15:38) (136 - 161)  BP: 156/101 (06-07-23 @ 14:58) (134/96 - 188/107)  RR: 18 (06-07-23 @ 15:38) (18 - 20)  SpO2: 97% (06-07-23 @ 15:38) (94% - 97%)  EKG and monitor showing A-Flutter w/ RVR. Started on Cardizem gtt HR improved 160s -> 120s.   Rest of labs including troponin unremarkable.   Admitted to telemetry for A-Flutter planned for cardioversion today however patient converted to NSR prior to the cardioversion.     Patient is stable for dc at this time   49yo female with history of HTN, A-Fib s/p ablation (Dr. Cates @ [facility]) off AC, LE DVT, KANDICE compliant w/ CPAP, Raynaud's, ex-smoker (quit 6 years ago) who presents to the ED c/o palpitations.     She also endorses chronic left posterior calf wound for which she sees Dr. Dyole, noticed increased drainage recently similar to prior infections and has been on multiple antibiotics for it.    PAF/ Flutter  HTN  H/O chronic LE DVT  KANDICE on CPAP  Lymphedema and L leg wound  Morbid obesity  Moderate pulmonary HTN               PLAN:    Tele reviewed. Pt converted back to NSR  Start her on Cardizem  mg po daily, firs dose now and stop the infusion half an hour later.   Cont Metoprolol 25 mg po q 12h  Cont Eliquis 5 mg po q 12h  EP and cardiology eval noted. Plan was DCCV but pt converted back to NSR  Local wound care of leg wounds. Augementin 10 day course and f/u Dr. Doyle burn service op   D/C home today  Care d/w the pt's cardiologist. D/C Losartan/HCTZ. Start her on Lasix 40 mg po daily

## 2023-06-08 NOTE — DISCHARGE NOTE PROVIDER - NSDCCPCAREPLAN_GEN_ALL_CORE_FT
PRINCIPAL DISCHARGE DIAGNOSIS  Diagnosis: Atrial fibrillation with RVR  Assessment and Plan of Treatment: You came to the hospital with a 1 month history of palpitations now persistent. You were seen by cardiology and planned for a cardioversion of aflutter however you converted to sinus rhythm prior to this. You were seen by the burn team for debriedment of lower extreemity venous ulcers they are recommending that you take antibiotics and perform wound care with santyl and xeroform. Follow up with Dr. Doyle in 2 weeks. take all medications as prescribed. Return to the ed if you experience worsening symptoms.

## 2023-06-08 NOTE — DISCHARGE NOTE NURSING/CASE MANAGEMENT/SOCIAL WORK - PATIENT PORTAL LINK FT
You can access the FollowMyHealth Patient Portal offered by [facility] by registering at the following website: [email or web address]. By joining [facility]’s FollowMyHealth portal, you will also be able to view your health information using other applications (apps) compatible with our system.

## 2023-06-08 NOTE — DISCHARGE NOTE PROVIDER - NSDCMRMEDTOKEN_GEN_ALL_CORE_FT
amoxicillin-clavulanate 875 mg-125 mg oral tablet: 1 tab(s) orally 2 times a day  apixaban 5 mg oral tablet: 1 tab(s) orally every 12 hours  dilTIAZem 240 mg/24 hours oral capsule, extended release: 1 cap(s) orally once a day  furosemide 40 mg oral tablet: 1 tab(s) orally once a day  Lipitor 80 mg oral tablet: 1 orally once a day (at bedtime)  marijuana: mdicinal for back and knee pain  metoprolol succinate 50 mg oral tablet, extended release: 1 orally once a day  sildenafil 20 mg oral tablet: 1 orally 3 times a day

## 2023-06-08 NOTE — CHART NOTE - NSCHARTNOTEFT_GEN_A_CORE
Spoke with cardio fellow regarding pt's uncontrolled A-Flutter HR 130s on max dose cardizem gtt.  recommend continue Cardizem gtt 15 mg/hr, PO lopressor add digoxin x1 and IV lopressor.   Pt remains hemodynamically stable, agree on medicine telemetry.
Spoke with Gita from burn service to discuss plan to dc patient today. Burn service requesting Augmentin, wound care as documented in burn note and 2 week follow up outpatient with Dr. Doyle.

## 2023-06-08 NOTE — DISCHARGE NOTE PROVIDER - CARE PROVIDER_API CALL
[facility] Medicine  [address]  [place] [address]  Phone: [phone]  Fax: [phone]  Follow Up Time: 2 weeks    Rashaun Hartley  Cardiovascular Disease  [address], [address]  [place] [address]  Phone: [phone]  Fax: [phone]  Follow Up Time: 2 weeks    Teo Frazier  Cardiovascular Disease  [address], [address]  [place] [address]  Phone: [phone]  Fax: [phone]  Follow Up Time: 2 weeks    Jorge L Doyle  Plastic Surgery  [address] [place] [address]  Phone: [phone]  Fax: [phone]  Follow Up Time: 2 weeks

## 2023-06-08 NOTE — DISCHARGE NOTE NURSING/CASE MANAGEMENT/SOCIAL WORK - NSDCPEFALRISK_GEN_ALL_CORE
For information on Fall & Injury Prevention, visit: [email or web address] OR  [email or web address] OR  https://www.cdc.gov/steadi/patient.html

## 2023-06-08 NOTE — DISCHARGE NOTE PROVIDER - NSDCFUSCHEDAPPT_GEN_ALL_CORE_FT
[facility]  [facility] [address]  Scheduled Appointment: 07/10/2023

## 2023-06-08 NOTE — DISCHARGE NOTE PROVIDER - PROVIDER TOKENS
PROVIDER:[TOKEN:[[id number]],FOLLOWUP:[2 weeks]],PROVIDER:[TOKEN:[[id number]],FOLLOWUP:[2 weeks]],PROVIDER:[TOKEN:[[id number]],FOLLOWUP:[2 weeks]],PROVIDER:[TOKEN:[[id number]],FOLLOWUP:[2 weeks]]

## 2023-06-08 NOTE — PROGRESS NOTE ADULT - SUBJECTIVE AND OBJECTIVE BOX
YANETH ALMARAZ  48y Female    CHIEF COMPLAINT:    Patient is a 48y old  Female who presents with a chief complaint of palpitations, dyspnea on exertion (07 Jun 2023 17:18)      INTERVAL HPI/OVERNIGHT EVENTS:    Patient seen and examined.    ROS: All other systems are negative.    Vital Signs:    T(F): 96.6 (06-08-23 @ 05:26), Max: 98.5 (06-07-23 @ 12:01)  HR: 80 (06-08-23 @ 05:26) (78 - 161)  BP: 126/67 (06-08-23 @ 05:26) (118/80 - 188/107)  RR: 18 (06-08-23 @ 05:26) (17 - 20)  SpO2: 97% (06-07-23 @ 22:40) (94% - 98%)  I&O's Summary    07 Jun 2023 07:01  -  08 Jun 2023 07:00  --------------------------------------------------------  IN: 470 mL / OUT: 1000 mL / NET: -530 mL      Daily Height in cm: 167.64 (07 Jun 2023 12:01)    Daily   CAPILLARY BLOOD GLUCOSE          PHYSICAL EXAM:    GENERAL:  NAD  SKIN: No rashes or lesions  HENT: Atraumatic. Normocephalic. PERRL. Moist membranes.  NECK: Supple, No JVD. No lymphadenopathy.  PULMONARY: CTA B/L. No wheezing. No rales  CVS: Normal S1, S2. Rate and Rhythm are regular. No murmurs.  ABDOMEN/GI: Soft, Nontender, Nondistended; BS present  EXTREMITIES: Peripheral pulses intact. No edema B/L LE.  NEUROLOGIC:  No motor or sensory deficit.  PSYCH: Alert & oriented x 3    Consultant(s) Notes Reviewed:  [x ] YES  [ ] NO  Care Discussed with Consultants/Other Providers [ x] YES  [ ] NO    EKG reviewed  Telemetry reviewed    LABS:                        12.6   7.34  )-----------( 271      ( 07 Jun 2023 12:16 )             39.6     06-07    141  |  101  |  16  ----------------------------<  113<H>  3.6   |  25  |  0.6<L>    Ca    9.3      07 Jun 2023 12:16  Mg     2.0     06-07    TPro  6.9  /  Alb  3.9  /  TBili  0.6  /  DBili  x   /  AST  27  /  ALT  20  /  AlkPhos  102  06-07        Trop <0.01, CKMB --, CK --, 06-07-23 @ 12:16        RADIOLOGY & ADDITIONAL TESTS:    < from: Xray Chest 1 View- PORTABLE-Urgent (06.07.23 @ 12:44) >    Impression:    Enlarged cardiac silhouette.  No other radiographic evidence of an acute cardiopulmonary abnormality.    < end of copied text >    Imaging or report Personally Reviewed:  [x ] YES  [ ] NO    Medications:  Standing  apixaban 5 milliGRAM(s) Oral every 12 hours  atorvastatin 80 milliGRAM(s) Oral at bedtime  diltiazem Infusion 10 mG/Hr IV Continuous <Continuous>  hydrochlorothiazide 12.5 milliGRAM(s) Oral daily  losartan 50 milliGRAM(s) Oral daily  metoprolol tartrate 25 milliGRAM(s) Oral every 12 hours    PRN Meds  acetaminophen     Tablet .. 650 milliGRAM(s) Oral every 6 hours PRN      Case discussed with resident    Care discussed with pt/family           YANETH ALMARAZ  48y Female    CHIEF COMPLAINT:    Patient is a 48y old  Female who presents with a chief complaint of palpitations, dyspnea on exertion (07 Jun 2023 17:18)      INTERVAL HPI/OVERNIGHT EVENTS:    Patient seen and examined. Gives h/o intermittent palpitations for the last one month. No palpitations now. No cp. No sob    ROS: All other systems are negative.    Vital Signs:    T(F): 96.6 (06-08-23 @ 05:26), Max: 98.5 (06-07-23 @ 12:01)  HR: 80 (06-08-23 @ 05:26) (78 - 161)  BP: 126/67 (06-08-23 @ 05:26) (118/80 - 188/107)  RR: 18 (06-08-23 @ 05:26) (17 - 20)  SpO2: 97% (06-07-23 @ 22:40) (94% - 98%)  I&O's Summary    07 Jun 2023 07:01  -  08 Jun 2023 07:00  --------------------------------------------------------  IN: 470 mL / OUT: 1000 mL / NET: -530 mL      Daily Height in cm: 167.64 (07 Jun 2023 12:01)    Daily   CAPILLARY BLOOD GLUCOSE          PHYSICAL EXAM:    GENERAL:  NAD  SKIN: No rashes or lesions  HENT: Atraumatic. Normocephalic. PERRL. Moist membranes.  NECK: Supple, No JVD. No lymphadenopathy.  PULMONARY: CTA B/L. No wheezing. No rales  CVS: Normal S1, S2. Rate and Rhythm are regular. No murmurs.  ABDOMEN/GI: Soft, Nontender, Nondistended; BS present  EXTREMITIES: Peripheral pulses intact. +ve edema B/L LE. Dressing on both legs  NEUROLOGIC:  No motor or sensory deficit.  PSYCH: Alert & oriented x 3    Consultant(s) Notes Reviewed:  [x ] YES  [ ] NO  Care Discussed with Consultants/Other Providers [ x] YES  [ ] NO    EKG reviewed  Telemetry reviewed    LABS:                        12.6   7.34  )-----------( 271      ( 07 Jun 2023 12:16 )             39.6     06-07    141  |  101  |  16  ----------------------------<  113<H>  3.6   |  25  |  0.6<L>    Ca    9.3      07 Jun 2023 12:16  Mg     2.0     06-07    TPro  6.9  /  Alb  3.9  /  TBili  0.6  /  DBili  x   /  AST  27  /  ALT  20  /  AlkPhos  102  06-07        Trop <0.01, CKMB --, CK --, 06-07-23 @ 12:16        RADIOLOGY & ADDITIONAL TESTS:    < from: Xray Chest 1 View- PORTABLE-Urgent (06.07.23 @ 12:44) >    Impression:    Enlarged cardiac silhouette.  No other radiographic evidence of an acute cardiopulmonary abnormality.    < end of copied text >    Imaging or report Personally Reviewed:  [x ] YES  [ ] NO    Medications:  Standing  apixaban 5 milliGRAM(s) Oral every 12 hours  atorvastatin 80 milliGRAM(s) Oral at bedtime  diltiazem Infusion 10 mG/Hr IV Continuous <Continuous>  hydrochlorothiazide 12.5 milliGRAM(s) Oral daily  losartan 50 milliGRAM(s) Oral daily  metoprolol tartrate 25 milliGRAM(s) Oral every 12 hours    PRN Meds  acetaminophen     Tablet .. 650 milliGRAM(s) Oral every 6 hours PRN      Case discussed with resident    Care discussed with pt/family

## 2023-06-11 LAB
-  AMIKACIN: SIGNIFICANT CHANGE UP
-  AZTREONAM: SIGNIFICANT CHANGE UP
-  CEFEPIME: SIGNIFICANT CHANGE UP
-  CEFTAZIDIME: SIGNIFICANT CHANGE UP
-  CIPROFLOXACIN: SIGNIFICANT CHANGE UP
-  GENTAMICIN: SIGNIFICANT CHANGE UP
-  IMIPENEM: SIGNIFICANT CHANGE UP
-  LEVOFLOXACIN: SIGNIFICANT CHANGE UP
-  MEROPENEM: SIGNIFICANT CHANGE UP
-  PIPERACILLIN/TAZOBACTAM: SIGNIFICANT CHANGE UP
-  TOBRAMYCIN: SIGNIFICANT CHANGE UP
CULTURE RESULTS: SIGNIFICANT CHANGE UP
METHOD TYPE: SIGNIFICANT CHANGE UP
ORGANISM # SPEC MICROSCOPIC CNT: SIGNIFICANT CHANGE UP
ORGANISM # SPEC MICROSCOPIC CNT: SIGNIFICANT CHANGE UP
SPECIMEN SOURCE: SIGNIFICANT CHANGE UP

## 2023-06-14 DIAGNOSIS — I10 ESSENTIAL (PRIMARY) HYPERTENSION: ICD-10-CM

## 2023-06-14 DIAGNOSIS — I48.0 PAROXYSMAL ATRIAL FIBRILLATION: ICD-10-CM

## 2023-06-14 DIAGNOSIS — I73.00 RAYNAUD'S SYNDROME WITHOUT GANGRENE: ICD-10-CM

## 2023-06-14 DIAGNOSIS — I89.0 LYMPHEDEMA, NOT ELSEWHERE CLASSIFIED: ICD-10-CM

## 2023-06-14 DIAGNOSIS — Z87.891 PERSONAL HISTORY OF NICOTINE DEPENDENCE: ICD-10-CM

## 2023-06-14 DIAGNOSIS — I83.022 VARICOSE VEINS OF LEFT LOWER EXTREMITY WITH ULCER OF CALF: ICD-10-CM

## 2023-06-14 DIAGNOSIS — E66.01 MORBID (SEVERE) OBESITY DUE TO EXCESS CALORIES: ICD-10-CM

## 2023-06-14 DIAGNOSIS — G47.33 OBSTRUCTIVE SLEEP APNEA (ADULT) (PEDIATRIC): ICD-10-CM

## 2023-06-14 DIAGNOSIS — I27.20 PULMONARY HYPERTENSION, UNSPECIFIED: ICD-10-CM

## 2023-06-14 DIAGNOSIS — Z90.49 ACQUIRED ABSENCE OF OTHER SPECIFIED PARTS OF DIGESTIVE TRACT: ICD-10-CM

## 2023-06-14 DIAGNOSIS — I48.92 UNSPECIFIED ATRIAL FLUTTER: ICD-10-CM

## 2023-06-14 DIAGNOSIS — L97.229 NON-PRESSURE CHRONIC ULCER OF LEFT CALF WITH UNSPECIFIED SEVERITY: ICD-10-CM

## 2023-07-10 ENCOUNTER — APPOINTMENT (OUTPATIENT)
Dept: ELECTROPHYSIOLOGY | Facility: CLINIC | Age: 48
End: 2023-07-10

## 2023-07-10 VITALS
HEART RATE: 86 BPM | SYSTOLIC BLOOD PRESSURE: 148 MMHG | RESPIRATION RATE: 20 BRPM | DIASTOLIC BLOOD PRESSURE: 93 MMHG | OXYGEN SATURATION: 98 %

## 2023-07-10 NOTE — H&P CARDIOLOGY - NSICDXPASTMEDICALHX_GEN_ALL_CORE_FT
PAST MEDICAL HISTORY:  Afib     Back pain     DVT, lower extremity     H/O Raynaud's syndrome     HTN (hypertension)     KANDICE on CPAP

## 2023-07-10 NOTE — H&P CARDIOLOGY - HISTORY OF PRESENT ILLNESS
47yo female, former smoker, with PMHx of HTN, HLD, h/o LE DVT, A-Fib s/p ablation 6 yrs ago @  [facility], has been off AC-> now back on Eliquis (last dose on _____), with recent admission in 6/2023 for palpitations with associated OLIVA, found to be in aflutter 2:1, was given cardizem, and was planned for cardioversion, however, patient converted to NSR prior to the cardioversion.  Pt was also managed by Dr. Doyle for chronic left posterior calf wound, for which he has been on multiple antibiotics.  Pt was discharged home.  An outpatient ECHO on 5/6/23 showed evidence of moderate pulmonary HTN with estimated pressure of 55mmHg, EF 55-60%, moderate TR, and mild MR.   Pt now presents for R and L cardiac cath.       Pre cath note:    indication:  [ ] STEMI                [ ] NSTEMI                 [ ] Acute coronary syndrome                     [ ]Unstable Angina   [ ] high risk  [ ] intermediate risk  [ ] low risk                     [ ] Stable Angina     non-invasive testing:                          Date:                     result: [ ] high risk  [ ] intermediate risk  [ ] low risk    Anti- Anginal medications:                    [ ] not used                       [ x] used                   [ ] not used but strong indication not to use  -on CCB and BB    Ejection Fraction                   [ ] <29            [ ] 30-39%   [ ] 40-49%     [x ]>50%    CHF                   [ ] active (within last 14 days on meds   [ ] Chronic (on meds but no exacerbation)    COPD                   [ ] mild (on chronic bronchodilators)  [ ] moderate (on chronic steroid therapy)      [ ] severe (indication for home O2 or PACO2 >50)    Other risk factors:                       [ ] Previous MI                     [ ] CVA/ stroke                    [ ] carotid stent/ CEA                    [ ] PVD/PAD- (arterial aneurysm, non-palpable pulses, tortuous vessel with inability to insert catheter, infra-renal dissection, renal or subclavian artery stenosis)                    [ ] diabetic                    [ ] previous CABG                    [ ] Renal Failure       Right Helder Test:    Adjusted Cath Bleeding Risk:     Pre-hydration:      49yo female, former smoker, with PMHx of HTN, HLD, COVID 2023, h/o LE DVT, A-Fib s/p ablation 6 yrs ago @  [facility], has been off AC-> now back on Eliquis (last dose on 23),   Sx history: ablation, cholecystectomy  Family history: MI-father and paternal uncles   Patient with recent admission in 2023 for palpitations with associated OLIVA, found to be in aflutter 2:1, was given Cardizem and was planned for cardioversion, however, patient converted to NSR prior to the cardioversion.  Pt was also managed by Dr. Doyle for chronic left posterior calf wound, for which he has been on multiple antibiotics.  Pt was discharged home. She since than has been having SOB on minimal exertion, moreso over the past recent days. An outpatient ECHO on 23 showed evidence of moderate pulmonary HTN with estimated pressure of 55mmHg, EF 55-60%, moderate TR, and mild MR.   Pt now presents for Right and Left cardiac cath.       Pre cath note:    indication:  [ ] STEMI                [ ] NSTEMI                 [ ] Acute coronary syndrome                     [ ]Unstable Angina   [ ] high risk  [ ] intermediate risk  [ ] low risk                     [ ] Stable Angina     non-invasive testing:                          Date:                     result: [ ] high risk  [ ] intermediate risk  [ ] low risk    Anti- Anginal medications:                    [ ] not used                       [ x] used                   [ ] not used but strong indication not to use  -on CCB and BB    Ejection Fraction                   [ ] <29            [ ] 30-39%   [ ] 40-49%     [x ]>50%    CHF                   [ ] active (within last 14 days on meds   [ ] Chronic (on meds but no exacerbation)    COPD                   [ ] mild (on chronic bronchodilators)  [ ] moderate (on chronic steroid therapy)      [ ] severe (indication for home O2 or PACO2 >50)    Other risk factors:                       [ ] Previous MI                     [ ] CVA/ stroke                    [ ] carotid stent/ CEA                    [ ] PVD/PAD- (arterial aneurysm, non-palpable pulses, tortuous vessel with inability to insert catheter, infra-renal dissection, renal or subclavian artery stenosis)                    [ ] diabetic                    [ ] previous CABG                    [ ] Renal Failure       Right Helder Test: Positvie  Right Barbeau test: []Class A   []Class B  [x]Class C  []Class D    Adjusted Cath Bleeding Risk: 1.3%    Pre-hydration: patient has moderate pulmonary hypertension, having symptoms of SOB on minimal exertion, b/l lower extremity pedal edema + 1 no bolus     EK23 SR with atrial premature complexes, bigeminy

## 2023-07-10 NOTE — H&P CARDIOLOGY - PULMONARY EMBOLUS
----- Message from Nupur Fraser sent at 11/23/2020  1:01 PM CST -----  Contact: [phone]  Patient is requesting a call back from the nurse per Dr. Sotelo's advise. Please advise    
Spoke to pt, see separate encounter note.  
no

## 2023-07-10 NOTE — H&P CARDIOLOGY - NSICDXPASTSURGICALHX_GEN_ALL_CORE_FT
PAST SURGICAL HISTORY:  H/O prior ablation treatment     History of laparoscopic cholecystectomy      Deep Sutures: 4-0 Monocryl

## 2023-07-11 ENCOUNTER — OUTPATIENT (OUTPATIENT)
Dept: INPATIENT UNIT | Facility: HOSPITAL | Age: 48
LOS: 1 days | Discharge: HOME | End: 2023-07-11
Payer: COMMERCIAL

## 2023-07-11 DIAGNOSIS — Z98.890 OTHER SPECIFIED POSTPROCEDURAL STATES: Chronic | ICD-10-CM

## 2023-07-11 DIAGNOSIS — Z90.49 ACQUIRED ABSENCE OF OTHER SPECIFIED PARTS OF DIGESTIVE TRACT: Chronic | ICD-10-CM

## 2023-07-11 DIAGNOSIS — I25.10 ATHEROSCLEROTIC HEART DISEASE OF NATIVE CORONARY ARTERY WITHOUT ANGINA PECTORIS: ICD-10-CM

## 2023-07-11 LAB
ANION GAP SERPL CALC-SCNC: 11 MMOL/L — SIGNIFICANT CHANGE UP (ref 7–14)
BUN SERPL-MCNC: 13 MG/DL — SIGNIFICANT CHANGE UP (ref 10–20)
CALCIUM SERPL-MCNC: 9.4 MG/DL — SIGNIFICANT CHANGE UP (ref 8.4–10.5)
CHLORIDE SERPL-SCNC: 107 MMOL/L — SIGNIFICANT CHANGE UP (ref 98–110)
CO2 SERPL-SCNC: 25 MMOL/L — SIGNIFICANT CHANGE UP (ref 17–32)
CREAT SERPL-MCNC: 0.7 MG/DL — SIGNIFICANT CHANGE UP (ref 0.7–1.5)
EGFR: 105 ML/MIN/1.73M2 — SIGNIFICANT CHANGE UP
GLUCOSE SERPL-MCNC: 123 MG/DL — HIGH (ref 70–99)
HCT VFR BLD CALC: 39.3 % — SIGNIFICANT CHANGE UP (ref 37–47)
HGB BLD-MCNC: 12.4 G/DL — SIGNIFICANT CHANGE UP (ref 12–16)
MCHC RBC-ENTMCNC: 28.3 PG — SIGNIFICANT CHANGE UP (ref 27–31)
MCHC RBC-ENTMCNC: 31.6 G/DL — LOW (ref 32–37)
MCV RBC AUTO: 89.7 FL — SIGNIFICANT CHANGE UP (ref 81–99)
NRBC # BLD: 0 /100 WBCS — SIGNIFICANT CHANGE UP (ref 0–0)
PLATELET # BLD AUTO: 231 K/UL — SIGNIFICANT CHANGE UP (ref 130–400)
PMV BLD: 9.9 FL — SIGNIFICANT CHANGE UP (ref 7.4–10.4)
POTASSIUM SERPL-MCNC: 4.3 MMOL/L — SIGNIFICANT CHANGE UP (ref 3.5–5)
POTASSIUM SERPL-SCNC: 4.3 MMOL/L — SIGNIFICANT CHANGE UP (ref 3.5–5)
RBC # BLD: 4.38 M/UL — SIGNIFICANT CHANGE UP (ref 4.2–5.4)
RBC # FLD: 17.9 % — HIGH (ref 11.5–14.5)
SODIUM SERPL-SCNC: 143 MMOL/L — SIGNIFICANT CHANGE UP (ref 135–146)
WBC # BLD: 7.8 K/UL — SIGNIFICANT CHANGE UP (ref 4.8–10.8)
WBC # FLD AUTO: 7.8 K/UL — SIGNIFICANT CHANGE UP (ref 4.8–10.8)

## 2023-07-11 PROCEDURE — 80048 BASIC METABOLIC PNL TOTAL CA: CPT

## 2023-07-11 PROCEDURE — C1769: CPT

## 2023-07-11 PROCEDURE — 85027 COMPLETE CBC AUTOMATED: CPT

## 2023-07-11 PROCEDURE — C1889: CPT

## 2023-07-11 PROCEDURE — C1894: CPT

## 2023-07-11 PROCEDURE — 93460 R&L HRT ART/VENTRICLE ANGIO: CPT

## 2023-07-11 PROCEDURE — C1887: CPT

## 2023-07-11 PROCEDURE — 36415 COLL VENOUS BLD VENIPUNCTURE: CPT

## 2023-07-11 NOTE — CHART NOTE - NSCHARTNOTEFT_GEN_A_CORE
PRE-OP DIAGNOSIS:      SOB, Pulmonary HTN  PROCEDURE:     [] Coronary Angiogram     [X] LHC       [X] RHC     [] Intervention (see below)  : None       PHYSICIAN:  Dr. Hartley    ASSISTANT:  Dr. Aleman       PROCEDURE DESCRIPTION:     Consent:      [X] Patient       Anesthesia:     [] General     [X] Sedation     [X] Local        Access & Closure:     [X]6 Fr Radial Artery , Radial band    [X] 6 Fr Brachial Vein - 14 F IV left       IV Contrast: 50 mL        Intervention: None      FINDINGS:     Coronary Dominance: Left      LM: Large size vessel , Normal     LAD:   P-LAD: Normal   Mid-LAD has mild CAD  Distal LAD: Normal     CX: This  is a large size vessel.  P-LCX: Normal  Mid-Lcx: Normal  Distal LCX: Normal   LPDA: Normal      RCA: This is nondominant vessel   RCA is normal .     Right Heart Cath:   RA :27 mmHg  RV: 49/12/23 mmHg  PA: 53/31/41 mmHg  PCWP : 31 mmHg  PA sat: 72 percent   Arterial sat: 97.6 percent  CO : 7.53 L/min  CI: 3.0   No thermodilution was performed.    On Nitro infusion PA pressure did not improve.    LVEDP 23 mmHg    EF: 50 % on Echo       ESTIMATED BLOOD LOSS: < 10 mL        CONDITION:     [X] Good     [] Fair     [] Critical        SPECIMEN REMOVED: N/A       POST-OP DIAGNOSIS:      Mild CAD .  Moderate Pulmonary HTN. Likely combination of HFpEF, obesity hypoventilation syndrome, Raynaud syndrome.      PLAN OF CARE:     [X] D/C Home Today          [] Medications: Increasing the diuretics.    [] IV Fluids: None because of elevated PCWP.

## 2023-07-11 NOTE — ASU PATIENT PROFILE, ADULT - PAIN CHRONIC, PROFILE
ACE-I therapy held because of profound ARNALDO  No evidence of acute exacerbation  Continue carvedilol     no

## 2023-07-11 NOTE — ASU PATIENT PROFILE, ADULT - FALL HARM RISK - UNIVERSAL INTERVENTIONS
Bed in lowest position, wheels locked, appropriate side rails in place/Call bell, personal items and telephone in reach/Instruct patient to call for assistance before getting out of bed or chair/Non-slip footwear when patient is out of bed/[place] to call system/Physically safe environment - no spills, clutter or unnecessary equipment/Purposeful Proactive Rounding/Room/bathroom lighting operational, light cord in reach

## 2023-07-11 NOTE — ASU PATIENT PROFILE, ADULT - NSICDXPASTSURGICALHX_GEN_ALL_CORE_FT
PAST SURGICAL HISTORY:  H/O prior ablation treatment     History of laparoscopic cholecystectomy

## 2023-07-18 DIAGNOSIS — I25.10 ATHEROSCLEROTIC HEART DISEASE OF NATIVE CORONARY ARTERY WITHOUT ANGINA PECTORIS: ICD-10-CM

## 2023-07-18 DIAGNOSIS — I27.0 PRIMARY PULMONARY HYPERTENSION: ICD-10-CM

## 2023-08-10 PROBLEM — Z86.79 PERSONAL HISTORY OF OTHER DISEASES OF THE CIRCULATORY SYSTEM: Chronic | Status: ACTIVE | Noted: 2023-07-10

## 2023-08-10 PROBLEM — G47.33 OBSTRUCTIVE SLEEP APNEA (ADULT) (PEDIATRIC): Chronic | Status: ACTIVE | Noted: 2023-07-10

## 2023-08-10 PROBLEM — I82.409 ACUTE EMBOLISM AND THROMBOSIS OF UNSPECIFIED DEEP VEINS OF UNSPECIFIED LOWER EXTREMITY: Chronic | Status: ACTIVE | Noted: 2023-07-10

## 2023-08-17 ENCOUNTER — OUTPATIENT (OUTPATIENT)
Dept: OUTPATIENT SERVICES | Facility: HOSPITAL | Age: 48
LOS: 1 days | End: 2023-08-17
Payer: COMMERCIAL

## 2023-08-17 ENCOUNTER — APPOINTMENT (OUTPATIENT)
Dept: BURN CARE | Facility: CLINIC | Age: 48
End: 2023-08-17
Payer: COMMERCIAL

## 2023-08-17 VITALS — SYSTOLIC BLOOD PRESSURE: 118 MMHG | TEMPERATURE: 97.3 F | HEART RATE: 75 BPM | DIASTOLIC BLOOD PRESSURE: 69 MMHG

## 2023-08-17 DIAGNOSIS — Z90.49 ACQUIRED ABSENCE OF OTHER SPECIFIED PARTS OF DIGESTIVE TRACT: Chronic | ICD-10-CM

## 2023-08-17 DIAGNOSIS — Z00.8 ENCOUNTER FOR OTHER GENERAL EXAMINATION: ICD-10-CM

## 2023-08-17 DIAGNOSIS — Z98.890 OTHER SPECIFIED POSTPROCEDURAL STATES: Chronic | ICD-10-CM

## 2023-08-17 PROCEDURE — 87070 CULTURE OTHR SPECIMN AEROBIC: CPT

## 2023-08-17 PROCEDURE — 87186 SC STD MICRODIL/AGAR DIL: CPT

## 2023-08-17 PROCEDURE — 99213 OFFICE O/P EST LOW 20 MIN: CPT

## 2023-08-17 PROCEDURE — 87077 CULTURE AEROBIC IDENTIFY: CPT

## 2023-08-18 NOTE — PHYSICAL EXAM
[New] : new [Size%: ______] : Size: [unfilled]% [Infected?] : Infected: No [3] : 3 out of 10 [Abnormal] : abnormal [Large] : medium [] : no [de-identified] : cont current pain meds and consider pain management [de-identified] : The venous stasis ulcers on the right leg measures 1x1cm and the left leg  measures  [id number].  A wound culture was obtained. The left leg has  adherent devitalized tissue.  The patient was instructed to clean  the wound with soap and water. Wash the wound with hibiclens soap .  Continue local wound care with silvadene cream.  start oral antibiotics  Follow up 1 week.  [TWNoteComboBox1] : ABD pad

## 2023-08-18 NOTE — REASON FOR VISIT
[Revisit] : revisit [Were you seen in the Emergency Room?] : seen in the emergency room [Were you admitted to the burn center at [facility]?] : not admitted to the burn center at [facility] [Family Member] : family member
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
## 2023-08-18 NOTE — ASSESSMENT
[FreeTextEntry1] : The venous stasis ulcers on the right leg measures 1x1cm and the left leg  measures  [id number].  A wound culture was obtained. The left leg has  adherent devitalized tissue.  The patient was instructed to clean  the wound with soap and water. Wash the wound with hibiclens soap .  Continue local wound care with silvadene cream.  start oral antibiotics  Follow up 1 week.  [Wound Care] : wound care

## 2023-08-19 LAB — BACTERIA SPEC CULT: ABNORMAL

## 2023-08-21 ENCOUNTER — APPOINTMENT (OUTPATIENT)
Dept: CARDIOLOGY | Facility: CLINIC | Age: 48
End: 2023-08-21
Payer: COMMERCIAL

## 2023-08-21 ENCOUNTER — APPOINTMENT (OUTPATIENT)
Dept: ELECTROPHYSIOLOGY | Facility: CLINIC | Age: 48
End: 2023-08-21
Payer: COMMERCIAL

## 2023-08-21 VITALS
OXYGEN SATURATION: 95 % | HEIGHT: 66 IN | SYSTOLIC BLOOD PRESSURE: 130 MMHG | DIASTOLIC BLOOD PRESSURE: 90 MMHG | HEART RATE: 115 BPM | BODY MASS INDEX: 47.09 KG/M2 | WEIGHT: 293 LBS

## 2023-08-21 DIAGNOSIS — Z78.9 OTHER SPECIFIED HEALTH STATUS: ICD-10-CM

## 2023-08-21 DIAGNOSIS — I83.029 VARICOSE VEINS OF LEFT LOWER EXTREMITY WITH ULCER OF UNSPECIFIED SITE: ICD-10-CM

## 2023-08-21 DIAGNOSIS — L97.319 NON-PRESSURE CHRONIC ULCER OF RIGHT ANKLE WITH UNSPECIFIED SEVERITY: ICD-10-CM

## 2023-08-21 DIAGNOSIS — I83.013 VARICOSE VEINS OF RIGHT LOWER EXTREMITY WITH ULCER OF ANKLE: ICD-10-CM

## 2023-08-21 DIAGNOSIS — Z87.891 PERSONAL HISTORY OF NICOTINE DEPENDENCE: ICD-10-CM

## 2023-08-21 DIAGNOSIS — Z86.718 PERSONAL HISTORY OF OTHER VENOUS THROMBOSIS AND EMBOLISM: ICD-10-CM

## 2023-08-21 DIAGNOSIS — L97.929 NON-PRESSURE CHRONIC ULCER OF UNSPECIFIED PART OF LEFT LOWER LEG WITH UNSPECIFIED SEVERITY: ICD-10-CM

## 2023-08-21 PROCEDURE — ZZZZZ: CPT

## 2023-08-21 PROCEDURE — 99215 OFFICE O/P EST HI 40 MIN: CPT | Mod: 25

## 2023-08-21 PROCEDURE — 93000 ELECTROCARDIOGRAM COMPLETE: CPT

## 2023-08-21 RX ORDER — LEVOFLOXACIN 750 MG/1
750 TABLET, FILM COATED ORAL DAILY
Qty: 10 | Refills: 0 | Status: DISCONTINUED | COMMUNITY
Start: 2023-05-02 | End: 2023-08-21

## 2023-08-21 RX ORDER — LEVOFLOXACIN 500 MG/1
500 TABLET, FILM COATED ORAL DAILY
Qty: 7 | Refills: 0 | Status: DISCONTINUED | COMMUNITY
Start: 2023-04-05 | End: 2023-08-21

## 2023-08-21 RX ORDER — AMOXICILLIN AND CLAVULANATE POTASSIUM 875; 125 MG/1; MG/1
875-125 TABLET, COATED ORAL
Qty: 14 | Refills: 0 | Status: DISCONTINUED | COMMUNITY
Start: 2023-03-09 | End: 2023-08-21

## 2023-08-21 RX ORDER — GENTAMICIN SULFATE 1 MG/G
0.1 OINTMENT TOPICAL TWICE DAILY
Qty: 1 | Refills: 0 | Status: DISCONTINUED | COMMUNITY
Start: 2023-03-09 | End: 2023-08-21

## 2023-08-21 NOTE — HISTORY OF PRESENT ILLNESS
[FreeTextEntry1] : EP: Dr. Frazier Cardio: Dr. Hartley  49 yo F with history of AF s/p ablation 6 yrs ago at [facility] (stopped taking Xarelto), LE DVT, KANDICE c/w CPAP, Raynaud's former smoker, with recent hospitalization from 6/7-6/8 for palpitations and SOB, found to be in aflutter 2:1. Cardizem with minimal response. Set up for TOLU/CV but self-converted prior to procedure. Recently had an outpatient ECHO a month ago which showed evidence of pulmonary HTN otherwise normal.   Of note, pt also has a chronic left posterior calf wound for which she sees Dr. Doyle. She noticed increased drainage recently similar to prior infections and has been on multiple antibiotics for it.   8/21/23 Presents as hospital follow up. Feels daily palpitations. She is interested in ablation. Has KANDICE and is c/w CPAP. Denies chest pain or syncope. Had recent cath with minimal CAD.

## 2023-08-21 NOTE — ASSESSMENT
[FreeTextEntry1] : 49 yo morbidly obese F with recent hospitalization for AFlutter with RVR here for evaluation of AF.   # Paroxysmal AFib/AFlutter - 2 week event monitor to assess AF burden since pt is having daily palpitations and to assess if pt has AFib as well as AFlutter - Cont Eliquis 5mg PO BID. Denies s/sx of bleeding.  - Follow up with Dr. Frazier to discuss ablation  # KANDICE  - Compliant with CPAP  # Raynaud's  I have also advised the patient to go to the nearest emergency room if she experiences any chest pain, dyspnea, syncope, or has any other compelling symptoms.   Follow up in 1 mo with Dr. Frazier

## 2023-08-21 NOTE — CARDIOLOGY SUMMARY
[de-identified] : 8/21/2023 tach ( bpm) (AFl with variable block?) [de-identified] : Cath 7/11/2023 Mild CAD. Mod pulm HTN. Likely combination of HFpEF, obesity hypoventilation syndrome and Raynaud.

## 2023-08-21 NOTE — DISCUSSION/SUMMARY
[FreeTextEntry1] : I have recommended an event monitor to further evaluate her symptoms to determine if the symptoms may be related to a cardiac arrhythmia.  I educated the patient about the patient symptom activator feature, and I have asked her to activate the event monitor whenever she has symptoms.   We had an extensive conversation regarding the nature of atrial fibrillation, including potential etiologies, underlying pathophysiology and natural history of the disease. In addition, the potential risk of thromboembolic events and assessment of that risk were discussed. I have emphasized the importance of continuing anticoagulation.   In addition, the maintenance of sinus rhythm along with adjuvant antiarrhythmic agents and catheter ablation therapy were discussed. The rationale for and risks of ablation therapy were discussed, including but not limited to bleeding, vascular injury, groin complications, cardiac perforation and tamponade, stroke, esophageal injury, pulmonary vein stenosis, need for pacemaker, need for cardiac surgery, and death. In addition, the long-term and ongoing nature of this therapy were also discussed, including the critical role of continued monitoring post-ablation and the potential for the necessity of repeat ablation procedures to definitively treat the condition.   The patient verbalized understanding of the discussion and all questions were addressed and answered. The patient would like to proceed with ablation with Dr. Frazier. [EKG obtained to assist in diagnosis and management of assessed problem(s)] : EKG obtained to assist in diagnosis and management of assessed problem(s)

## 2023-08-21 NOTE — PHYSICAL EXAM
[Well Developed] : well developed [Well Nourished] : well nourished [No Acute Distress] : no acute distress [Obese] : obese [Normal Conjunctiva] : normal conjunctiva [Normal Venous Pressure] : normal venous pressure [Normal S1, S2] : normal S1, S2 [No Murmur] : no murmur [Clear Lung Fields] : clear lung fields [Good Air Entry] : good air entry [No Respiratory Distress] : no respiratory distress  [Soft] : abdomen soft [Normal Gait] : normal gait [No Edema] : no edema [No Rash] : no rash [Moves all extremities] : moves all extremities [Normal Speech] : normal speech [Alert and Oriented] : alert and oriented [Normal memory] : normal memory [de-identified] : tachycardic

## 2023-08-24 ENCOUNTER — APPOINTMENT (OUTPATIENT)
Dept: BURN CARE | Facility: CLINIC | Age: 48
End: 2023-08-24

## 2023-08-28 ENCOUNTER — INPATIENT (INPATIENT)
Facility: HOSPITAL | Age: 48
LOS: 2 days | Discharge: HOME HEALTH | DRG: 243 | End: 2023-08-31
Attending: STUDENT IN AN ORGANIZED HEALTH CARE EDUCATION/TRAINING PROGRAM | Admitting: INTERNAL MEDICINE
Payer: COMMERCIAL

## 2023-08-28 ENCOUNTER — APPOINTMENT (OUTPATIENT)
Dept: ELECTROPHYSIOLOGY | Facility: CLINIC | Age: 48
End: 2023-08-28
Payer: COMMERCIAL

## 2023-08-28 VITALS
DIASTOLIC BLOOD PRESSURE: 82 MMHG | BODY MASS INDEX: 47.09 KG/M2 | WEIGHT: 293 LBS | HEART RATE: 109 BPM | TEMPERATURE: 98 F | HEIGHT: 66 IN | SYSTOLIC BLOOD PRESSURE: 140 MMHG

## 2023-08-28 VITALS
DIASTOLIC BLOOD PRESSURE: 82 MMHG | HEART RATE: 114 BPM | TEMPERATURE: 98 F | SYSTOLIC BLOOD PRESSURE: 151 MMHG | RESPIRATION RATE: 20 BRPM | OXYGEN SATURATION: 96 %

## 2023-08-28 DIAGNOSIS — L97.829 NON-PRESSURE CHRONIC ULCER OF OTHER PART OF LEFT LOWER LEG WITH UNSPECIFIED SEVERITY: ICD-10-CM

## 2023-08-28 DIAGNOSIS — I45.5 OTHER SPECIFIED HEART BLOCK: ICD-10-CM

## 2023-08-28 DIAGNOSIS — Z87.891 PERSONAL HISTORY OF NICOTINE DEPENDENCE: ICD-10-CM

## 2023-08-28 DIAGNOSIS — I97.89 OTHER POSTPROCEDURAL COMPLICATIONS AND DISORDERS OF THE CIRCULATORY SYSTEM, NOT ELSEWHERE CLASSIFIED: ICD-10-CM

## 2023-08-28 DIAGNOSIS — E78.5 HYPERLIPIDEMIA, UNSPECIFIED: ICD-10-CM

## 2023-08-28 DIAGNOSIS — I83.028 VARICOSE VEINS OF LEFT LOWER EXTREMITY WITH ULCER OTHER PART OF LOWER LEG: ICD-10-CM

## 2023-08-28 DIAGNOSIS — Z86.16 PERSONAL HISTORY OF COVID-19: ICD-10-CM

## 2023-08-28 DIAGNOSIS — I87.2 VENOUS INSUFFICIENCY (CHRONIC) (PERIPHERAL): ICD-10-CM

## 2023-08-28 DIAGNOSIS — I73.00 RAYNAUD'S SYNDROME WITHOUT GANGRENE: ICD-10-CM

## 2023-08-28 DIAGNOSIS — I27.20 PULMONARY HYPERTENSION, UNSPECIFIED: ICD-10-CM

## 2023-08-28 DIAGNOSIS — I48.92 UNSPECIFIED ATRIAL FLUTTER: ICD-10-CM

## 2023-08-28 DIAGNOSIS — G47.33 OBSTRUCTIVE SLEEP APNEA (ADULT) (PEDIATRIC): ICD-10-CM

## 2023-08-28 DIAGNOSIS — Z90.49 ACQUIRED ABSENCE OF OTHER SPECIFIED PARTS OF DIGESTIVE TRACT: Chronic | ICD-10-CM

## 2023-08-28 DIAGNOSIS — Z98.890 OTHER SPECIFIED POSTPROCEDURAL STATES: Chronic | ICD-10-CM

## 2023-08-28 DIAGNOSIS — I10 ESSENTIAL (PRIMARY) HYPERTENSION: ICD-10-CM

## 2023-08-28 DIAGNOSIS — I48.0 PAROXYSMAL ATRIAL FIBRILLATION: ICD-10-CM

## 2023-08-28 DIAGNOSIS — Z86.718 PERSONAL HISTORY OF OTHER VENOUS THROMBOSIS AND EMBOLISM: ICD-10-CM

## 2023-08-28 LAB
ABO RH CONFIRMATION: SIGNIFICANT CHANGE UP
ALBUMIN SERPL ELPH-MCNC: 3.9 G/DL — SIGNIFICANT CHANGE UP (ref 3.5–5.2)
ALP SERPL-CCNC: 106 U/L — SIGNIFICANT CHANGE UP (ref 30–115)
ALT FLD-CCNC: 22 U/L — SIGNIFICANT CHANGE UP (ref 0–41)
ANION GAP SERPL CALC-SCNC: 14 MMOL/L — SIGNIFICANT CHANGE UP (ref 7–14)
APTT BLD: 30.2 SEC — SIGNIFICANT CHANGE UP (ref 27–39.2)
AST SERPL-CCNC: 32 U/L — SIGNIFICANT CHANGE UP (ref 0–41)
BASOPHILS # BLD AUTO: 0.05 K/UL — SIGNIFICANT CHANGE UP (ref 0–0.2)
BASOPHILS NFR BLD AUTO: 0.8 % — SIGNIFICANT CHANGE UP (ref 0–1)
BILIRUB SERPL-MCNC: 0.6 MG/DL — SIGNIFICANT CHANGE UP (ref 0.2–1.2)
BLD GP AB SCN SERPL QL: SIGNIFICANT CHANGE UP
BUN SERPL-MCNC: 16 MG/DL — SIGNIFICANT CHANGE UP (ref 10–20)
CALCIUM SERPL-MCNC: 8.9 MG/DL — SIGNIFICANT CHANGE UP (ref 8.4–10.5)
CHLORIDE SERPL-SCNC: 105 MMOL/L — SIGNIFICANT CHANGE UP (ref 98–110)
CO2 SERPL-SCNC: 25 MMOL/L — SIGNIFICANT CHANGE UP (ref 17–32)
CREAT SERPL-MCNC: 0.9 MG/DL — SIGNIFICANT CHANGE UP (ref 0.7–1.5)
EGFR: 78 ML/MIN/1.73M2 — SIGNIFICANT CHANGE UP
EOSINOPHIL # BLD AUTO: 0.14 K/UL — SIGNIFICANT CHANGE UP (ref 0–0.7)
EOSINOPHIL NFR BLD AUTO: 2.2 % — SIGNIFICANT CHANGE UP (ref 0–8)
GLUCOSE SERPL-MCNC: 79 MG/DL — SIGNIFICANT CHANGE UP (ref 70–99)
HCG SERPL QL: NEGATIVE — SIGNIFICANT CHANGE UP
HCT VFR BLD CALC: 37.6 % — SIGNIFICANT CHANGE UP (ref 37–47)
HGB BLD-MCNC: 11.9 G/DL — LOW (ref 12–16)
IMM GRANULOCYTES NFR BLD AUTO: 0.3 % — SIGNIFICANT CHANGE UP (ref 0.1–0.3)
INR BLD: 1.19 RATIO — SIGNIFICANT CHANGE UP (ref 0.65–1.3)
LYMPHOCYTES # BLD AUTO: 1.21 K/UL — SIGNIFICANT CHANGE UP (ref 1.2–3.4)
LYMPHOCYTES # BLD AUTO: 19.3 % — LOW (ref 20.5–51.1)
MAGNESIUM SERPL-MCNC: 2.2 MG/DL — SIGNIFICANT CHANGE UP (ref 1.8–2.4)
MCHC RBC-ENTMCNC: 28.7 PG — SIGNIFICANT CHANGE UP (ref 27–31)
MCHC RBC-ENTMCNC: 31.6 G/DL — LOW (ref 32–37)
MCV RBC AUTO: 90.6 FL — SIGNIFICANT CHANGE UP (ref 81–99)
MONOCYTES # BLD AUTO: 0.94 K/UL — HIGH (ref 0.1–0.6)
MONOCYTES NFR BLD AUTO: 15 % — HIGH (ref 1.7–9.3)
NEUTROPHILS # BLD AUTO: 3.91 K/UL — SIGNIFICANT CHANGE UP (ref 1.4–6.5)
NEUTROPHILS NFR BLD AUTO: 62.4 % — SIGNIFICANT CHANGE UP (ref 42.2–75.2)
NRBC # BLD: 0 /100 WBCS — SIGNIFICANT CHANGE UP (ref 0–0)
NT-PROBNP SERPL-SCNC: 1458 PG/ML — HIGH (ref 0–300)
PLATELET # BLD AUTO: 239 K/UL — SIGNIFICANT CHANGE UP (ref 130–400)
PMV BLD: 9.9 FL — SIGNIFICANT CHANGE UP (ref 7.4–10.4)
POTASSIUM SERPL-MCNC: 4 MMOL/L — SIGNIFICANT CHANGE UP (ref 3.5–5)
POTASSIUM SERPL-SCNC: 4 MMOL/L — SIGNIFICANT CHANGE UP (ref 3.5–5)
PROT SERPL-MCNC: 6.9 G/DL — SIGNIFICANT CHANGE UP (ref 6–8)
PROTHROM AB SERPL-ACNC: 13.6 SEC — HIGH (ref 9.95–12.87)
RBC # BLD: 4.15 M/UL — LOW (ref 4.2–5.4)
RBC # FLD: 18.9 % — HIGH (ref 11.5–14.5)
SODIUM SERPL-SCNC: 144 MMOL/L — SIGNIFICANT CHANGE UP (ref 135–146)
TROPONIN T SERPL-MCNC: <0.01 NG/ML — SIGNIFICANT CHANGE UP
WBC # BLD: 6.27 K/UL — SIGNIFICANT CHANGE UP (ref 4.8–10.8)
WBC # FLD AUTO: 6.27 K/UL — SIGNIFICANT CHANGE UP (ref 4.8–10.8)

## 2023-08-28 PROCEDURE — 93010 ELECTROCARDIOGRAM REPORT: CPT

## 2023-08-28 PROCEDURE — 80048 BASIC METABOLIC PNL TOTAL CA: CPT

## 2023-08-28 PROCEDURE — 85025 COMPLETE CBC W/AUTO DIFF WBC: CPT

## 2023-08-28 PROCEDURE — 93000 ELECTROCARDIOGRAM COMPLETE: CPT

## 2023-08-28 PROCEDURE — C1785: CPT

## 2023-08-28 PROCEDURE — C1894: CPT

## 2023-08-28 PROCEDURE — 80053 COMPREHEN METABOLIC PANEL: CPT

## 2023-08-28 PROCEDURE — 85730 THROMBOPLASTIN TIME PARTIAL: CPT

## 2023-08-28 PROCEDURE — 93280 PM DEVICE PROGR EVAL DUAL: CPT

## 2023-08-28 PROCEDURE — 99285 EMERGENCY DEPT VISIT HI MDM: CPT

## 2023-08-28 PROCEDURE — 85652 RBC SED RATE AUTOMATED: CPT

## 2023-08-28 PROCEDURE — 85610 PROTHROMBIN TIME: CPT

## 2023-08-28 PROCEDURE — 33208 INSRT HEART PM ATRIAL & VENT: CPT | Mod: KX

## 2023-08-28 PROCEDURE — 86140 C-REACTIVE PROTEIN: CPT

## 2023-08-28 PROCEDURE — 73590 X-RAY EXAM OF LOWER LEG: CPT | Mod: LT

## 2023-08-28 PROCEDURE — 99215 OFFICE O/P EST HI 40 MIN: CPT | Mod: 25

## 2023-08-28 PROCEDURE — 83735 ASSAY OF MAGNESIUM: CPT

## 2023-08-28 PROCEDURE — C1898: CPT

## 2023-08-28 PROCEDURE — C1892: CPT

## 2023-08-28 PROCEDURE — 71045 X-RAY EXAM CHEST 1 VIEW: CPT | Mod: 26

## 2023-08-28 PROCEDURE — 93970 EXTREMITY STUDY: CPT

## 2023-08-28 PROCEDURE — 93005 ELECTROCARDIOGRAM TRACING: CPT

## 2023-08-28 PROCEDURE — C1889: CPT

## 2023-08-28 PROCEDURE — 36415 COLL VENOUS BLD VENIPUNCTURE: CPT

## 2023-08-28 PROCEDURE — 71045 X-RAY EXAM CHEST 1 VIEW: CPT

## 2023-08-28 PROCEDURE — C1769: CPT

## 2023-08-28 PROCEDURE — 93925 LOWER EXTREMITY STUDY: CPT

## 2023-08-28 PROCEDURE — 85027 COMPLETE CBC AUTOMATED: CPT

## 2023-08-28 PROCEDURE — 71046 X-RAY EXAM CHEST 2 VIEWS: CPT

## 2023-08-28 RX ORDER — CIPROFLOXACIN HYDROCHLORIDE 500 MG/1
500 TABLET, FILM COATED ORAL
Qty: 14 | Refills: 0 | Status: COMPLETED | COMMUNITY
Start: 2023-03-09 | End: 2023-08-28

## 2023-08-28 RX ORDER — CIPROFLOXACIN HYDROCHLORIDE 500 MG/1
500 TABLET, FILM COATED ORAL TWICE DAILY
Qty: 20 | Refills: 0 | Status: COMPLETED | COMMUNITY
Start: 2023-08-17 | End: 2023-08-28

## 2023-08-28 RX ORDER — DOXYCYCLINE HYCLATE 100 MG/1
100 TABLET ORAL DAILY
Qty: 7 | Refills: 0 | Status: COMPLETED | COMMUNITY
Start: 2023-03-09 | End: 2023-08-28

## 2023-08-28 RX ORDER — CLINDAMYCIN HYDROCHLORIDE 300 MG/1
300 CAPSULE ORAL
Qty: 21 | Refills: 0 | Status: COMPLETED | COMMUNITY
Start: 2023-08-17 | End: 2023-08-28

## 2023-08-28 NOTE — HISTORY OF PRESENT ILLNESS
[FreeTextEntry1] : EP: Dr. Frazier Cardio: Dr. Hartley  AF s/p ablation 6 yrs ago at [facility] (stopped taking Xarelto), LE DVT, KANDICE c/w CPAP, Raynaud's former smoker, with recent hospitalization from 6/7-6/8 for palpitations and SOB, found to be in aflutter 2:1. Cardizem with minimal response. Set up for TOLU/CV but self-converted prior to procedure. Recently had an outpatient ECHO a month ago which showed evidence of pulmonary HTN otherwise normal.   Of note, pt also has a chronic left posterior calf wound for which she sees Dr. Doyle. She noticed increased drainage recently similar to prior infections and has been on multiple antibiotics for it.   8/21/2023: Presents as hospital follow up. Feels daily palpitations. She is interested in ablation. Has KANDICE and is c/w CPAP. Denies chest pain or syncope. Had recent cath with minimal CAD.  8/28/2023: 7h20 am 6.2 sec pause. intermittent complete AV block with HR < 20 bpm. She has no chest pain, no shortness of breath, no dyspnea on exertion, no orthopnea, no PND. She denies syncope. She has no exertional symptoms. She reports mild intermittent dizziness, lightheadedness and She presents for urgent follow-up.

## 2023-08-28 NOTE — ED PROVIDER NOTE - OBJECTIVE STATEMENT
47 yo F with PMHx of HTN, aFib s/p ablation on Eliquis, LE DVT, KANDICE on CPAP, Raynaud's and HLD presents to the ED sent in by Dr. Frazier for pacemaker placement. Pt has ILR in place which noted a 6 second pause this morning. Pt was sleeping at that time. She currently denies any complaints. Pt denies fever, chills, nausea, vomiting, abdominal pain, diarrhea, headache, dizziness, weakness, chest pain, SOB, back pain, LOC, trauma, urinary symptoms, cough, calf pain.

## 2023-08-28 NOTE — REASON FOR VISIT
[Arrhythmia/ECG Abnorrmalities] : arrhythmia/ECG abnormalities [Other: ____] : [unfilled] [FreeTextEntry3] : Dr. Rashaun Hartley

## 2023-08-28 NOTE — PHYSICAL EXAM
[Well Developed] : well developed [Well Nourished] : well nourished [No Acute Distress] : no acute distress [Obese] : obese [Normal Conjunctiva] : normal conjunctiva [Normal Venous Pressure] : normal venous pressure [Normal S1, S2] : normal S1, S2 [No Murmur] : no murmur [Clear Lung Fields] : clear lung fields [Good Air Entry] : good air entry [No Respiratory Distress] : no respiratory distress  [Soft] : abdomen soft [Normal Gait] : normal gait [No Edema] : no edema [No Rash] : no rash [Moves all extremities] : moves all extremities [Normal Speech] : normal speech [Alert and Oriented] : alert and oriented [Normal memory] : normal memory [de-identified] : tachycardic

## 2023-08-28 NOTE — ED PROVIDER NOTE - ATTENDING APP SHARED VISIT CONTRIBUTION OF CARE
Patient presents to ED, sent in by her cardiologist for admission for Pacemaker placement. Patient denies symptoms in ED.   Vitals reviewed.   Patient is awake, alert, answering questions appropriately, appears comfortable and not in any distress.  Lungs: CTA, no wheezing, no crackles.  Abd: +BS, NT, ND, soft,   A/P: Arrhythmia,   labs, EKG, CXR,   reevaluation.

## 2023-08-28 NOTE — ED ADULT TRIAGE NOTE - BEFAST SPEECH SLURRED
No
There are 8 steps, c far darien rails, at the entry of the house and 2 flights of steps, both with L rail up, to negotiate at home.

## 2023-08-28 NOTE — ED PROVIDER NOTE - CLINICAL SUMMARY MEDICAL DECISION MAKING FREE TEXT BOX
Laboratory results reviewed and discussed with patient. CBC shows mild anemia and platelet count are WNL. BMP shows electrolytes WNL and no EMMANUEL.  Troponin is negative and ProBNP is elevated.   CXR reviewed by me and shows, cardiomegaly, no PTX, no free air.   Patient remained hemodynamically stable during the course of ED stay. Discussed with patient about the results of the diagnostic studies. Discussed with admitting physician and MAR, patient is admitted to Medicine for further evaluation and care.

## 2023-08-28 NOTE — ED ADULT TRIAGE NOTE - CHIEF COMPLAINT QUOTE
Patient sent in by cardiologist for pacemaker placement. Patient had a 6 second pause this AM. Denies any chest pain or palpitations at this time

## 2023-08-28 NOTE — ED PROVIDER NOTE - PHYSICAL EXAMINATION
VITAL SIGNS: I have reviewed nursing notes and confirm.  CONSTITUTIONAL: 49 yo F laying on stretcher; in no acute distress.  SKIN: Skin exam is warm and dry, no acute rash.  HEAD: Normocephalic; atraumatic.  EYES: Conjunctiva and sclera clear.  ENT: No nasal discharge; airway clear.   CARD: S1, S2 normal; no murmurs, gallops, or rubs. Tachycardic, regular.   RESP: No wheezes, rales or rhonchi. Speaking in full sentences.   ABD: Normal bowel sounds; soft; non-distended; non-tender; No rebound or guarding. No CVA tenderness.  EXT: Normal ROM.   NEURO: Alert, oriented. Grossly unremarkable. No focal deficits.

## 2023-08-28 NOTE — CARDIOLOGY SUMMARY
[de-identified] : 8/282023: sinus tachycardia at 109 bpm; 8/21/2023 tach ( bpm) (AFl with variable block?) [de-identified] : Cath 7/11/2023 Mild CAD. Mod pulm HTN. Likely combination of HFpEF, obesity hypoventilation syndrome and Raynaud.

## 2023-08-28 NOTE — DISCUSSION/SUMMARY
[EKG obtained to assist in diagnosis and management of assessed problem(s)] : EKG obtained to assist in diagnosis and management of assessed problem(s) [FreeTextEntry1] : 47 yo morbidly obese F with recent hospitalization for AFlutter with RVR here for evaluation of AF. Tachy-adriana syndorme. pause 6.2 seconds  # Paroxysmal AFib/AFlutter - 2 week event monitor to assess AF burden since pt is having daily palpitations and to assess if pt has AFib as well as AFlutter - Cont Eliquis 5mg PO BID. Denies s/sx of bleeding.  - event on 8/28/2023: Tachy-adriana syndrome. pause 6.2 seconds. at 7h20 am. was sleeping and using CPAP.  # KANDICE  - Compliant with CPAP  # Raynaud's  I am recommending a dual chamber pacemaker implantation for the treatment of the patient's condition. We discussed the nature of procedure, risks, alternatives and follow up care after device is implanted, including remote monitoring. We discussed the risks of the procedure including but not limited to bleeding, hematoma, injury to vessels and heart, perforation, tamponade, cardiac arrest, stroke, need for surgery, pneumothorax, infection and device malfunction. Patient expressed understanding of the discussion. I answered all the questions in details. Patient agreed with proceeding with the device implant.   Patient will go to ER for admission and evaluation for PPM. needs wound care consult prior.  Patient will follow with me in 4-6 weeks' time or earlier if symptoms develop or worsen. Please do not hesitate to contact me at [phone] if you have any further questions regarding this patients care.

## 2023-08-29 LAB
ANION GAP SERPL CALC-SCNC: 8 MMOL/L — SIGNIFICANT CHANGE UP (ref 7–14)
APTT BLD: 30.5 SEC — SIGNIFICANT CHANGE UP (ref 27–39.2)
BUN SERPL-MCNC: 17 MG/DL — SIGNIFICANT CHANGE UP (ref 10–20)
CALCIUM SERPL-MCNC: 8.8 MG/DL — SIGNIFICANT CHANGE UP (ref 8.4–10.5)
CHLORIDE SERPL-SCNC: 109 MMOL/L — SIGNIFICANT CHANGE UP (ref 98–110)
CO2 SERPL-SCNC: 29 MMOL/L — SIGNIFICANT CHANGE UP (ref 17–32)
CREAT SERPL-MCNC: 0.6 MG/DL — LOW (ref 0.7–1.5)
EGFR: 109 ML/MIN/1.73M2 — SIGNIFICANT CHANGE UP
GLUCOSE SERPL-MCNC: 100 MG/DL — HIGH (ref 70–99)
HCT VFR BLD CALC: 34.4 % — LOW (ref 37–47)
HGB BLD-MCNC: 10.5 G/DL — LOW (ref 12–16)
INR BLD: 1.19 RATIO — SIGNIFICANT CHANGE UP (ref 0.65–1.3)
MAGNESIUM SERPL-MCNC: 2.1 MG/DL — SIGNIFICANT CHANGE UP (ref 1.8–2.4)
MCHC RBC-ENTMCNC: 28.2 PG — SIGNIFICANT CHANGE UP (ref 27–31)
MCHC RBC-ENTMCNC: 30.5 G/DL — LOW (ref 32–37)
MCV RBC AUTO: 92.5 FL — SIGNIFICANT CHANGE UP (ref 81–99)
NRBC # BLD: 0 /100 WBCS — SIGNIFICANT CHANGE UP (ref 0–0)
PLATELET # BLD AUTO: 179 K/UL — SIGNIFICANT CHANGE UP (ref 130–400)
PMV BLD: 9.4 FL — SIGNIFICANT CHANGE UP (ref 7.4–10.4)
POTASSIUM SERPL-MCNC: 3.6 MMOL/L — SIGNIFICANT CHANGE UP (ref 3.5–5)
POTASSIUM SERPL-SCNC: 3.6 MMOL/L — SIGNIFICANT CHANGE UP (ref 3.5–5)
PROTHROM AB SERPL-ACNC: 13.6 SEC — HIGH (ref 9.95–12.87)
RBC # BLD: 3.72 M/UL — LOW (ref 4.2–5.4)
RBC # FLD: 18.8 % — HIGH (ref 11.5–14.5)
SODIUM SERPL-SCNC: 146 MMOL/L — SIGNIFICANT CHANGE UP (ref 135–146)
WBC # BLD: 4.91 K/UL — SIGNIFICANT CHANGE UP (ref 4.8–10.8)
WBC # FLD AUTO: 4.91 K/UL — SIGNIFICANT CHANGE UP (ref 4.8–10.8)

## 2023-08-29 PROCEDURE — 99223 1ST HOSP IP/OBS HIGH 75: CPT

## 2023-08-29 PROCEDURE — 93925 LOWER EXTREMITY STUDY: CPT | Mod: 26

## 2023-08-29 PROCEDURE — 93970 EXTREMITY STUDY: CPT | Mod: 26

## 2023-08-29 RX ORDER — PANTOPRAZOLE SODIUM 20 MG/1
40 TABLET, DELAYED RELEASE ORAL
Refills: 0 | Status: DISCONTINUED | OUTPATIENT
Start: 2023-08-29 | End: 2023-08-31

## 2023-08-29 RX ORDER — LANOLIN ALCOHOL/MO/W.PET/CERES
3 CREAM (GRAM) TOPICAL AT BEDTIME
Refills: 0 | Status: DISCONTINUED | OUTPATIENT
Start: 2023-08-29 | End: 2023-08-31

## 2023-08-29 RX ORDER — METOPROLOL TARTRATE 50 MG
1 TABLET ORAL
Refills: 0 | DISCHARGE

## 2023-08-29 RX ORDER — ACETAMINOPHEN 500 MG
650 TABLET ORAL EVERY 6 HOURS
Refills: 0 | Status: DISCONTINUED | OUTPATIENT
Start: 2023-08-29 | End: 2023-08-30

## 2023-08-29 RX ORDER — ATORVASTATIN CALCIUM 80 MG/1
80 TABLET, FILM COATED ORAL AT BEDTIME
Refills: 0 | Status: DISCONTINUED | OUTPATIENT
Start: 2023-08-29 | End: 2023-08-31

## 2023-08-29 RX ADMIN — ATORVASTATIN CALCIUM 80 MILLIGRAM(S): 80 TABLET, FILM COATED ORAL at 22:07

## 2023-08-29 RX ADMIN — Medication 650 MILLIGRAM(S): at 13:10

## 2023-08-29 NOTE — H&P ADULT - ATTENDING COMMENTS
HPI as above.  Interval history: Pt seen and examined at bedside. No cp or sob.   Vital Signs (24 Hrs):  T(C): 37.1 (08-29-23 @ 08:02), Max: 37.1 (08-28-23 @ 22:23)  HR: 98 (08-29-23 @ 08:02) (88 - 122)  BP: 112/69 (08-29-23 @ 08:02) (112/69 - 151/82)  RR: 19 (08-29-23 @ 08:02) (18 - 20)  SpO2: 98% (08-29-23 @ 08:02) (96% - 99%)  Wt(kg): --  Daily     Daily     I&O's Summary    PHYSICAL EXAM:  GENERAL: NAD, well-developed  HEAD:  Atraumatic, Normocephalic  EYES: EOMI, PERRLA, conjunctiva and sclera clear  NECK: Supple, No JVD  CHEST/LUNG: Clear to auscultation bilaterally; No wheeze  HEART: Regular rate and rhythm; No murmurs, rubs, or gallops  ABDOMEN: Soft, Nontender, Nondistended; Bowel sounds present  EXTREMITIES:  2+ Peripheral Pulses, No clubbing, cyanosis, or edema  PSYCH: AAOx3  NEUROLOGY: non-focal  SKIN: No rashes or lesions    Labs reviewed  Imaging reviewed independently and reviewed read  < from: Xray Chest 1 View- PORTABLE-Urgent (08.28.23 @ 21:41) >    Impression:    Cardiomegaly, unchanged.    No acute infiltrates.    < end of copied text >      EKG reviewed independently and reviewed read  < from: 12 Lead ECG (06.07.23 @ 12:04) >    Diagnosis Line Atrial flutter with variable A-V block  Low voltage QRS  Septal infarct , age undetermined  Abnormal ECG    < end of copied text >      Plan  #sinus adriana, pauses- EP appreciated, PPM in am   #Left leg wound drainage- hold antibiotics, ID consult, duplex, burn   #rest as above     #Progress Note Handoff  Pending (specify):  PPM in am, NPO  Family discussion: house staff updated pt family  Disposition: cardiac telemonitoring   Decision to admit the pt is based on acuity as above

## 2023-08-29 NOTE — H&P ADULT - NSHPLABSRESULTS_GEN_ALL_CORE
11.9   6.27  )-----------( 239      ( 28 Aug 2023 20:53 )             37.6     08-28    144  |  105  |  16  ----------------------------<  79  4.0   |  25  |  0.9    Ca    8.9      28 Aug 2023 20:53  Mg     2.2     08-28    TPro  6.9  /  Alb  3.9  /  TBili  0.6  /  DBili  x   /  AST  32  /  ALT  22  /  AlkPhos  106  08-28          Urinalysis Basic - ( 28 Aug 2023 20:53 )    Color: x / Appearance: x / SG: x / pH: x  Gluc: 79 mg/dL / Ketone: x  / Bili: x / Urobili: x   Blood: x / Protein: x / Nitrite: x   Leuk Esterase: x / RBC: x / WBC x   Sq Epi: x / Non Sq Epi: x / Bacteria: x      PT/INR - ( 28 Aug 2023 20:53 )   PT: 13.60 sec;   INR: 1.19 ratio         PTT - ( 28 Aug 2023 20:53 )  PTT:30.2 sec  Lactate Trend    CARDIAC MARKERS ( 28 Aug 2023 20:53 )  x     / <0.01 ng/mL / x     / x     / x          CAPILLARY BLOOD GLUCOSE

## 2023-08-29 NOTE — CONSULT NOTE ADULT - ASSESSMENT
EP: Freddie  Cards: Odilia     EP: Freddie  Cards: Odilia    49 yo female, former smoker, with PMH of HTN, HLD, of venous ulcers on b/l LE s/p debridement, COVID 1/2023, h/o LE DVT, A-Fib s/p ablation 6 yrs ago @  [facility], on Eliquis. She was sent to ED by Dr. Frazier for pacemaker placement. Pt has ILR in place which noted a 6 second pause and bradycardiac to 20s this morning.     TSH:  0.28        Impression:  Sinus node dysfunction 6 sec pause, adriana 20s on ILR  Hx HTN, HLD  Hx venous stasis ulcers  Hx LE DVT  Hx PAF s/p ablation ([facility])    Plan:  - Plan for PPM tomorrow, discussed with patient  - NPO after midnight  - Hold AC and any DVT ppx  - Send UA  - Echo  - Send type and screen  - Will follow     EP: Freddie  Cards: Odilia    49 yo female, former smoker, with PMH of HTN, HLD, of venous ulcers on b/l LE s/p debridement, COVID 1/2023, h/o LE DVT, A-Fib s/p ablation 6 yrs ago @  [facility], on Eliquis. She was sent to ED by Dr. Frazier for pacemaker placement. Pt has ILR in place which noted a 6 second pause and bradycardiac to 20s this morning.     TSH:  0.28        Impression:  Sinus node dysfunction 6 sec pause, adriana 20s on ILR  Hx HTN, HLD  Hx venous stasis ulcers  Hx LE DVT  Hx PAF s/p ablation ([facility])    Plan:  - Plan for PPM tomorrow, discussed with patient  - NPO after midnight  - Hold AC and any DVT ppx  - Send UA  - Echo  - Send type and screen  - Burn c/s to assess ulcers prior to OR  - Will follow

## 2023-08-29 NOTE — CONSULT NOTE ADULT - SUBJECTIVE AND OBJECTIVE BOX
Patient is a 48y old  Female who presents with a chief complaint of Sinus pause (29 Aug 2023 03:00)    HPI:  47 yo female, former smoker, with PMH of HTN, HLD, COVID 1/2023, h/o LE DVT, A-Fib s/p ablation 6 yrs ago @  [facility], has been off AC but now back on Eliquis. She was sent to ED by Dr. Frazier for pacemaker placement. Pt has ILR in place which noted a 6 second pause and bradycardiac to 20s this morning. She has hx of venous ulcers on b/l LE and had debridement with Dr Doyle for right side, she has a new wound on left side and follows with Dr Doyle, as per patient he was planning for debridement for it. It's draining pus as per patient.      Pt denies fever, chills, nausea, vomiting, abdominal pain, diarrhea, headache, dizziness, weakness, chest pain, SOB, back pain, LOC, trauma, urinary symptoms, cough, calf pain.  She was admitted in 6/2023 for palpitations with associated OLIVA, found to be in aflutter 2:1, was given Cardizem and was planned for cardioversion, however, patient converted to NSR prior to the cardioversion.  Pt was also managed by Dr. Doyle for chronic left posterior calf wound, for which he has been on multiple antibiotics. An outpatient ECHO on 5/6/23 showed evidence of moderate pulmonary HTN with estimated pressure of 55mmHg, EF 55-60%, moderate TR, and mild MR.     Vital Signs Last 24 Hrs  T(C): 36.7 (29 Aug 2023 01:24), Max: 37.1 (28 Aug 2023 22:23)  T(F): 98.1 (29 Aug 2023 01:24), Max: 98.8 (28 Aug 2023 22:23)  HR: 88 (29 Aug 2023 01:24) (88 - 122)  BP: 124/67 (29 Aug 2023 01:24) (124/67 - 151/82)  BP(mean): 85 (29 Aug 2023 01:24) (85 - 85)  RR: 18 (28 Aug 2023 22:23) (18 - 20)  SpO2: 99% (29 Aug 2023 01:24) (96% - 99%)  O2 Parameters below as of 28 Aug 2023 22:23  Patient On (Oxygen Delivery Method): room air      (29 Aug 2023 03:00)    EP consulted for PPM placement for SND.     REVIEW OF SYSTEMS    [X] A ten-point review of systems was otherwise negative except as noted.  [ ] Due to altered mental status/intubation, subjective information were not able to be obtained from the patient. History was obtained, to the extent possible, from review of the chart and collateral sources of information.      PAST MEDICAL & SURGICAL HISTORY:  HTN (hypertension)  Afib  Back pain  H/O Raynaud's syndrome  DVT, lower extremity  KANDICE on CPAP  History of laparoscopic cholecystectomy  H/O prior ablation treatment          Home Medications:  Bumex 2 mg oral tablet: 1 orally once a day (29 Aug 2023 04:31)  Lipitor 80 mg oral tablet: 1 orally once a day (at bedtime) (29 Aug 2023 04:31)  metoprolol succinate 100 mg oral tablet, extended release: 1 orally once a day (29 Aug 2023 04:31)  sildenafil 20 mg oral tablet: 1 orally 3 times a day (29 Aug 2023 04:31)      Allergies:    No Known Allergies      FAMILY HISTORY:  FH: myocardial infarction (Father)        SOCIAL HISTORY:  CIGARETTES: denies   ALCOHOL: denies    MEDICATIONS  (STANDING):  atorvastatin 80 milliGRAM(s) Oral at bedtime  pantoprazole    Tablet 40 milliGRAM(s) Oral before breakfast    MEDICATIONS  (PRN):  acetaminophen     Tablet .. 650 milliGRAM(s) Oral every 6 hours PRN Temp greater or equal to 38C (100.4F), Mild Pain (1 - 3)  aluminum hydroxide/magnesium hydroxide/simethicone Suspension 30 milliLiter(s) Oral every 4 hours PRN Dyspepsia  melatonin 3 milliGRAM(s) Oral at bedtime PRN Insomnia      Vital Signs Last 24 Hrs  T(C): 37.1 (29 Aug 2023 08:02), Max: 37.1 (28 Aug 2023 22:23)  T(F): 98.7 (29 Aug 2023 08:02), Max: 98.8 (28 Aug 2023 22:23)  HR: 98 (29 Aug 2023 08:02) (88 - 122)  BP: 112/69 (29 Aug 2023 08:02) (112/69 - 151/82)  BP(mean): 85 (29 Aug 2023 01:24) (85 - 85)  RR: 19 (29 Aug 2023 08:02) (18 - 20)  SpO2: 98% (29 Aug 2023 08:02) (96% - 99%)    Parameters below as of 29 Aug 2023 08:02  Patient On (Oxygen Delivery Method): BiPAP/CPAP        PHYSICAL EXAM:    GENERAL: Obese female,  In no apparent distress, well nourished, and hydrated.  HEART: Irregular rate and rhythm; No murmurs, rubs, or gallops.  PULMONARY: Clear to auscultation and perfusion.  No rales, wheezing, or rhonchi bilaterally.  ABDOMEN: Soft, Nontender, Nondistended; Bowel sounds present  EXTREMITIES:  2+ Peripheral Pulses, No clubbing, cyanosis. Chronic lymph edema  NEUROLOGICAL: AO x4, FONSECA, speech clear    INTERPRETATION OF TELEMETRY:     ECG:  < from: 12 Lead ECG (06.07.23 @ 12:04) >    Ventricular Rate 154 BPM    Atrial Rate 271 BPM    QRS Duration 76 ms    Q-T Interval 294 ms    QTC Calculation(Bazett) 470 ms    R Axis 57 degrees    T Axis 21 degrees    Diagnosis Line Atrial flutter with variable A-V block  Low voltage QRS  Septal infarct , age undetermined  Abnormal ECG    < end of copied text >    I&O's Detail      LABS:                        10.5   4.91  )-----------( 179      ( 29 Aug 2023 07:43 )             34.4     08-29    146  |  109  |  17  ----------------------------<  100<H>  3.6   |  29  |  0.6<L>    Ca    8.8      29 Aug 2023 07:43  Mg     2.1     08-29    TPro  6.9  /  Alb  3.9  /  TBili  0.6  /  DBili  x   /  AST  32  /  ALT  22  /  AlkPhos  106  08-28    CARDIAC MARKERS ( 28 Aug 2023 20:53 )  x     / <0.01 ng/mL / x     / x     / x          PT/INR - ( 29 Aug 2023 07:43 )   PT: 13.60 sec;   INR: 1.19 ratio         PTT - ( 29 Aug 2023 07:43 )  PTT:30.5 sec  BNP      I&O's Detail      RADIOLOGY:   Patient is a 48y old  Female who presents with a chief complaint of Sinus pause (29 Aug 2023 03:00)    HPI:  47 yo female, former smoker, with PMH of HTN, HLD, COVID 1/2023, h/o LE DVT, A-Fib s/p ablation 6 yrs ago @  [facility], has been off AC but now back on Eliquis. She was sent to ED by Dr. Frazier for pacemaker placement. Pt has ILR in place which noted a 6 second pause and bradycardiac to 20s this morning. She has hx of venous ulcers on b/l LE and had debridement with Dr Doyle for right side, she has a new wound on left side and follows with Dr Doyle, as per patient he was planning for debridement for it. It's draining pus as per patient.      Pt denies fever, chills, nausea, vomiting, abdominal pain, diarrhea, headache, dizziness, weakness, chest pain, SOB, back pain, LOC, trauma, urinary symptoms, cough, calf pain.  She was admitted in 6/2023 for palpitations with associated OLIVA, found to be in aflutter 2:1, was given Cardizem and was planned for cardioversion, however, patient converted to NSR prior to the cardioversion.  Pt was also managed by Dr. Doyle for chronic left posterior calf wound, for which he has been on multiple antibiotics. An outpatient ECHO on 5/6/23 showed evidence of moderate pulmonary HTN with estimated pressure of 55mmHg, EF 55-60%, moderate TR, and mild MR.     Vital Signs Last 24 Hrs  T(C): 36.7 (29 Aug 2023 01:24), Max: 37.1 (28 Aug 2023 22:23)  T(F): 98.1 (29 Aug 2023 01:24), Max: 98.8 (28 Aug 2023 22:23)  HR: 88 (29 Aug 2023 01:24) (88 - 122)  BP: 124/67 (29 Aug 2023 01:24) (124/67 - 151/82)  BP(mean): 85 (29 Aug 2023 01:24) (85 - 85)  RR: 18 (28 Aug 2023 22:23) (18 - 20)  SpO2: 99% (29 Aug 2023 01:24) (96% - 99%)  O2 Parameters below as of 28 Aug 2023 22:23  Patient On (Oxygen Delivery Method): room air      (29 Aug 2023 03:00)    EP consulted for PPM placement for SND.     REVIEW OF SYSTEMS    [X] A ten-point review of systems was otherwise negative except as noted.  [ ] Due to altered mental status/intubation, subjective information were not able to be obtained from the patient. History was obtained, to the extent possible, from review of the chart and collateral sources of information.      PAST MEDICAL & SURGICAL HISTORY:  HTN (hypertension)  Afib  Back pain  H/O Raynaud's syndrome  DVT, lower extremity  KANDICE on CPAP  History of laparoscopic cholecystectomy  H/O prior ablation treatment          Home Medications:  Bumex 2 mg oral tablet: 1 orally once a day (29 Aug 2023 04:31)  Lipitor 80 mg oral tablet: 1 orally once a day (at bedtime) (29 Aug 2023 04:31)  metoprolol succinate 100 mg oral tablet, extended release: 1 orally once a day (29 Aug 2023 04:31)  sildenafil 20 mg oral tablet: 1 orally 3 times a day (29 Aug 2023 04:31)      Allergies:    No Known Allergies      FAMILY HISTORY:  FH: myocardial infarction (Father)        SOCIAL HISTORY:  CIGARETTES: denies   ALCOHOL: denies    MEDICATIONS  (STANDING):  atorvastatin 80 milliGRAM(s) Oral at bedtime  pantoprazole    Tablet 40 milliGRAM(s) Oral before breakfast    MEDICATIONS  (PRN):  acetaminophen     Tablet .. 650 milliGRAM(s) Oral every 6 hours PRN Temp greater or equal to 38C (100.4F), Mild Pain (1 - 3)  aluminum hydroxide/magnesium hydroxide/simethicone Suspension 30 milliLiter(s) Oral every 4 hours PRN Dyspepsia  melatonin 3 milliGRAM(s) Oral at bedtime PRN Insomnia      Vital Signs Last 24 Hrs  T(C): 37.1 (29 Aug 2023 08:02), Max: 37.1 (28 Aug 2023 22:23)  T(F): 98.7 (29 Aug 2023 08:02), Max: 98.8 (28 Aug 2023 22:23)  HR: 98 (29 Aug 2023 08:02) (88 - 122)  BP: 112/69 (29 Aug 2023 08:02) (112/69 - 151/82)  BP(mean): 85 (29 Aug 2023 01:24) (85 - 85)  RR: 19 (29 Aug 2023 08:02) (18 - 20)  SpO2: 98% (29 Aug 2023 08:02) (96% - 99%)    Parameters below as of 29 Aug 2023 08:02  Patient On (Oxygen Delivery Method): BiPAP/CPAP        PHYSICAL EXAM:    GENERAL: Obese female,  In no apparent distress, well nourished, and hydrated.  HEART: Irregular rate and rhythm; No murmurs, rubs, or gallops.  PULMONARY: Clear to auscultation and perfusion.  No rales, wheezing, or rhonchi bilaterally.  ABDOMEN: Soft, Nontender, Nondistended; Bowel sounds present  EXTREMITIES:  2+ Peripheral Pulses, No clubbing, cyanosis. Chronic lymph edema/venous stasis  NEUROLOGICAL: AO x4, FONSECA, speech clear    INTERPRETATION OF TELEMETRY:     ECG:  < from: 12 Lead ECG (06.07.23 @ 12:04) >    Ventricular Rate 154 BPM    Atrial Rate 271 BPM    QRS Duration 76 ms    Q-T Interval 294 ms    QTC Calculation(Bazett) 470 ms    R Axis 57 degrees    T Axis 21 degrees    Diagnosis Line Atrial flutter with variable A-V block  Low voltage QRS  Septal infarct , age undetermined  Abnormal ECG    < end of copied text >    I&O's Detail      LABS:                        10.5   4.91  )-----------( 179      ( 29 Aug 2023 07:43 )             34.4     08-29    146  |  109  |  17  ----------------------------<  100<H>  3.6   |  29  |  0.6<L>    Ca    8.8      29 Aug 2023 07:43  Mg     2.1     08-29    TPro  6.9  /  Alb  3.9  /  TBili  0.6  /  DBili  x   /  AST  32  /  ALT  22  /  AlkPhos  106  08-28    CARDIAC MARKERS ( 28 Aug 2023 20:53 )  x     / <0.01 ng/mL / x     / x     / x          PT/INR - ( 29 Aug 2023 07:43 )   PT: 13.60 sec;   INR: 1.19 ratio         PTT - ( 29 Aug 2023 07:43 )  PTT:30.5 sec  BNP      I&O's Detail      RADIOLOGY:

## 2023-08-29 NOTE — H&P ADULT - HISTORY OF PRESENT ILLNESS
Vital Signs Last 24 Hrs  T(C): 36.7 (29 Aug 2023 01:24), Max: 37.1 (28 Aug 2023 22:23)  T(F): 98.1 (29 Aug 2023 01:24), Max: 98.8 (28 Aug 2023 22:23)  HR: 88 (29 Aug 2023 01:24) (88 - 122)  BP: 124/67 (29 Aug 2023 01:24) (124/67 - 151/82)  BP(mean): 85 (29 Aug 2023 01:24) (85 - 85)  RR: 18 (28 Aug 2023 22:23) (18 - 20)  SpO2: 99% (29 Aug 2023 01:24) (96% - 99%)  O2 Parameters below as of 28 Aug 2023 22:23  Patient On (Oxygen Delivery Method): room air         47 yo female, former smoker, with PMH of HTN, HLD, COVID 1/2023, h/o LE DVT, A-Fib s/p ablation 6 yrs ago @  [facility], has been off AC but now back on Eliquis. She was sent to ED by Dr. Frazier for pacemaker placement. Pt has ILR in place which noted a 6 second pause and bradycardiac to 20s this morning. She has hx of venous ulcers on b/l LE and had debridement with Dr Doyle for right side, she has a new wound on left side and follows with Dr Doyle, as per patient he was planning for debridement for it. It's draining pus as per patient.      Pt denies fever, chills, nausea, vomiting, abdominal pain, diarrhea, headache, dizziness, weakness, chest pain, SOB, back pain, LOC, trauma, urinary symptoms, cough, calf pain.  She was admitted in 6/2023 for palpitations with associated OLIVA, found to be in aflutter 2:1, was given Cardizem and was planned for cardioversion, however, patient converted to NSR prior to the cardioversion.  Pt was also managed by Dr. Doyle for chronic left posterior calf wound, for which he has been on multiple antibiotics. An outpatient ECHO on 5/6/23 showed evidence of moderate pulmonary HTN with estimated pressure of 55mmHg, EF 55-60%, moderate TR, and mild MR.     Vital Signs Last 24 Hrs  T(C): 36.7 (29 Aug 2023 01:24), Max: 37.1 (28 Aug 2023 22:23)  T(F): 98.1 (29 Aug 2023 01:24), Max: 98.8 (28 Aug 2023 22:23)  HR: 88 (29 Aug 2023 01:24) (88 - 122)  BP: 124/67 (29 Aug 2023 01:24) (124/67 - 151/82)  BP(mean): 85 (29 Aug 2023 01:24) (85 - 85)  RR: 18 (28 Aug 2023 22:23) (18 - 20)  SpO2: 99% (29 Aug 2023 01:24) (96% - 99%)  O2 Parameters below as of 28 Aug 2023 22:23  Patient On (Oxygen Delivery Method): room air

## 2023-08-29 NOTE — H&P ADULT - ASSESSMENT
#A-flutter w/ RVR  #s/p ablation hx A-Fib            #Suspected pulmonary HTN  -as per pt on outpatient echo  -could explain pt's dyspnea on exertion  -f/u TTE  -pt takes sildenafil for Raynaud's     #Left leg wound with drainage  -sees Dr. Doyle, sepsis ruled out on admission  -burn consult for eval    #Raynaud's  -on sildenafil, can hold for now    #HTN, uncontrolled  -on metoprolol succinate 50 mg, Losartan 50 mg, HCTZ 12.5 mg  -can continue home meds, switch BB To lopressor while inpatient  -on cardizem gtt    #KANDICE  -continue CPAP use - 9 cm H2o          #DVT PPx-   #GI PPx-  #Diet-   #CHG  #Activity-   #Dispo-   #Code-    47 yo female, former smoker, with PMH of HTN, HLD, COVID 1/2023, h/o LE DVT, A-Fib s/p ablation 6 yrs ago @  [facility], has been off AC but now back on Eliquis. She was sent to ED by Dr. Frazier for pacemaker placement. Pt has ILR in place which noted a 6 second pause and bradycardiac to 20s this morning. She has hx of venous ulcers on b/l LE and had debridement with Dr Doyle for right side, she has a new wound on left side and follows with Dr Doyle, as per patient he was planning for debridement. It's draining pus as per patient.       # Sinus pause of 6 seconds, bradycardia to 20s  #A-flutter w/ RVR  #s/p ablation hx A-Fib on Eliquis  - admit to tele  - pacemaker placement tomorrow  - f/u preop labs  - hold eliquis for now  - will hold metoprolol and sildenafil for now, resume as per Cardiology  - will hold of Bumex for the procedure to avoid hypotension, resume post op  - trops negative  - mildly elevated BNP 1458    #Left leg wound with drainage  -f/u Dr. Doyle  -burn consult for eval  - plan for debridement    # pulmonary HTN  - Moderate Pulmo HTN  - c/w sildenafil for Raynaud's and Pul HTN    #Raynaud's  -on sildenafil,  - can hold for now, resume after the procedure    #HTN, uncontrolled  -on metoprolol succinate 100 mg, and Bumex 2 mg QD  -can resume after the procedure    #KANDICE  -continue CPAP       #DVT PPx- on Eliquis  #GI PPx-protonix  #Diet- DASH  #Activity- AAT  #Dispo- Acute   49 yo female, former smoker, with PMH of HTN, HLD, COVID 1/2023, h/o LE DVT, A-Fib s/p ablation 6 yrs ago @  [facility], has been off AC but now back on Eliquis. She was sent to ED by Dr. Frazier for pacemaker placement. Pt has ILR in place which noted a 6 second pause and bradycardiac to 20s this morning. She has hx of venous ulcers on b/l LE and had debridement with Dr Doyle for right side, she has a new wound on left side and follows with Dr Doyle, as per patient he was planning for debridement. It's draining pus as per patient.       # Sinus pause of 6 seconds, bradycardia to 20s  #A-flutter w/ RVR  #s/p ablation hx chronic A-Fib on Eliquis  - admit to tele  - pacemaker placement tomorrow  - f/u preop labs  - hold eliquis for now  - will hold metoprolol and sildenafil for now, resume as per Cardiology  - will hold of Bumex for the procedure to avoid hypotension, resume post op  - trops negative  - mildly elevated BNP 1458    #Left leg wound with drainage  -f/u Dr. Doyle  -burn consult for eval  - plan for debridement    # pulmonary HTN  - Moderate Pulmo HTN  - c/w sildenafil for Raynaud's and Pul HTN    #Raynaud's  -on sildenafil,  - can hold for now, resume after the procedure    #HTN, uncontrolled  -on metoprolol succinate 100 mg, and Bumex 2 mg QD  -can resume after the procedure    #KANDICE  -continue CPAP       #DVT PPx- on Eliquis  #GI PPx-protonix  #Diet- DASH  #Activity- AAT  #Dispo- Acute

## 2023-08-29 NOTE — H&P ADULT - NSHPPHYSICALEXAM_GEN_ALL_CORE
GENERAL: NAD, lying in bed comfortably  HEAD:  Atraumatic, Normocephalic  EYES: conjunctiva and sclera clear  ENT: Moist mucous membranes  NECK: Supple, No JVD  CHEST/LUNG: Clear to auscultation bilaterally; No rales, rhonchi, wheezing, or rubs. Unlabored respirations  HEART: Regular rate and rhythm; No murmurs, rubs, or gallops  ABDOMEN: Soft, nontender, nondistended  EXTREMITIES:  No clubbing, cyanosis, or edema  NERVOUS SYSTEM:  A&Ox3 GENERAL: NAD, lying in bed comfortably  HEAD:  Atraumatic, Normocephalic  EYES: conjunctiva and sclera clear  ENT: Moist mucous membranes  NECK: Supple, No JVD  CHEST/LUNG: Clear to auscultation bilaterally; No rales, rhonchi, wheezing, or rubs. Unlabored respirations  HEART: Regular rate and rhythm; No murmurs, rubs, or gallops  ABDOMEN: Soft, nontender, nondistended  EXTREMITIES:  B/L venous ulcer, left sided draining pus and has bandage on it  NERVOUS SYSTEM:  A&Ox3

## 2023-08-29 NOTE — CONSULT NOTE ADULT - NS ATTEND AMEND GEN_ALL_CORE FT
Sinus Node Dysfunction  Paroxysmal Atrial fibrillation    EKG, Tele reviewed  ILR showed pause    Needs PPM placement and ILR removal  I discussed the risks, benefits and alternatives for device implantation.    I reported a risk of major complications at 1% and minor complications at 3%. The details of the procedure and risks associated with undergoing the procedure were discussed in detail including but not limited to, death, myocardial ischemia, stroke, cardiac perforation, potential need for temporary pacemaker implantation, lung damage requiring chest tube (pneumothorax), blood clot, blood collection requiring blood transfusion or repeat surgery (hematoma), infection, or vascular injury. All questions were answered to satisfaction and the patient expressed verbal understanding of the procedure, the benefits, and risks. The patient agreed for the procedure.    - Hold OAC for PPM sanjay  - Echo report from Dr. Hartley's office  - NPO p MN

## 2023-08-30 LAB
ALBUMIN SERPL ELPH-MCNC: 3.6 G/DL — SIGNIFICANT CHANGE UP (ref 3.5–5.2)
ALP SERPL-CCNC: 88 U/L — SIGNIFICANT CHANGE UP (ref 30–115)
ALT FLD-CCNC: 20 U/L — SIGNIFICANT CHANGE UP (ref 0–41)
ANION GAP SERPL CALC-SCNC: 11 MMOL/L — SIGNIFICANT CHANGE UP (ref 7–14)
AST SERPL-CCNC: 27 U/L — SIGNIFICANT CHANGE UP (ref 0–41)
BILIRUB SERPL-MCNC: 0.8 MG/DL — SIGNIFICANT CHANGE UP (ref 0.2–1.2)
BUN SERPL-MCNC: 17 MG/DL — SIGNIFICANT CHANGE UP (ref 10–20)
CALCIUM SERPL-MCNC: 8.9 MG/DL — SIGNIFICANT CHANGE UP (ref 8.4–10.5)
CHLORIDE SERPL-SCNC: 107 MMOL/L — SIGNIFICANT CHANGE UP (ref 98–110)
CO2 SERPL-SCNC: 25 MMOL/L — SIGNIFICANT CHANGE UP (ref 17–32)
CREAT SERPL-MCNC: 0.5 MG/DL — LOW (ref 0.7–1.5)
CRP SERPL-MCNC: 6.3 MG/L — HIGH
EGFR: 114 ML/MIN/1.73M2 — SIGNIFICANT CHANGE UP
ERYTHROCYTE [SEDIMENTATION RATE] IN BLOOD: 48 MM/HR — HIGH (ref 0–20)
GLUCOSE SERPL-MCNC: 112 MG/DL — HIGH (ref 70–99)
HCT VFR BLD CALC: 35.8 % — LOW (ref 37–47)
HGB BLD-MCNC: 11.1 G/DL — LOW (ref 12–16)
MAGNESIUM SERPL-MCNC: 2.1 MG/DL — SIGNIFICANT CHANGE UP (ref 1.8–2.4)
MCHC RBC-ENTMCNC: 28.3 PG — SIGNIFICANT CHANGE UP (ref 27–31)
MCHC RBC-ENTMCNC: 31 G/DL — LOW (ref 32–37)
MCV RBC AUTO: 91.3 FL — SIGNIFICANT CHANGE UP (ref 81–99)
NRBC # BLD: 0 /100 WBCS — SIGNIFICANT CHANGE UP (ref 0–0)
PLATELET # BLD AUTO: 213 K/UL — SIGNIFICANT CHANGE UP (ref 130–400)
PMV BLD: 9.9 FL — SIGNIFICANT CHANGE UP (ref 7.4–10.4)
POTASSIUM SERPL-MCNC: 3.9 MMOL/L — SIGNIFICANT CHANGE UP (ref 3.5–5)
POTASSIUM SERPL-SCNC: 3.9 MMOL/L — SIGNIFICANT CHANGE UP (ref 3.5–5)
PROT SERPL-MCNC: 6.2 G/DL — SIGNIFICANT CHANGE UP (ref 6–8)
RBC # BLD: 3.92 M/UL — LOW (ref 4.2–5.4)
RBC # FLD: 19 % — HIGH (ref 11.5–14.5)
SODIUM SERPL-SCNC: 143 MMOL/L — SIGNIFICANT CHANGE UP (ref 135–146)
WBC # BLD: 5.85 K/UL — SIGNIFICANT CHANGE UP (ref 4.8–10.8)
WBC # FLD AUTO: 5.85 K/UL — SIGNIFICANT CHANGE UP (ref 4.8–10.8)

## 2023-08-30 PROCEDURE — 99232 SBSQ HOSP IP/OBS MODERATE 35: CPT

## 2023-08-30 PROCEDURE — 71045 X-RAY EXAM CHEST 1 VIEW: CPT | Mod: 26

## 2023-08-30 PROCEDURE — 71045 X-RAY EXAM CHEST 1 VIEW: CPT | Mod: 26,77

## 2023-08-30 PROCEDURE — 73590 X-RAY EXAM OF LOWER LEG: CPT | Mod: 26,LT

## 2023-08-30 PROCEDURE — 33208 INSRT HEART PM ATRIAL & VENT: CPT | Mod: KX

## 2023-08-30 PROCEDURE — 93010 ELECTROCARDIOGRAM REPORT: CPT

## 2023-08-30 RX ORDER — METOPROLOL TARTRATE 50 MG
5 TABLET ORAL ONCE
Refills: 0 | Status: COMPLETED | OUTPATIENT
Start: 2023-08-30 | End: 2023-08-30

## 2023-08-30 RX ORDER — METOPROLOL TARTRATE 50 MG
50 TABLET ORAL ONCE
Refills: 0 | Status: COMPLETED | OUTPATIENT
Start: 2023-08-30 | End: 2023-08-30

## 2023-08-30 RX ORDER — HYDROMORPHONE HYDROCHLORIDE 2 MG/ML
1 INJECTION INTRAMUSCULAR; INTRAVENOUS; SUBCUTANEOUS
Refills: 0 | Status: DISCONTINUED | OUTPATIENT
Start: 2023-08-30 | End: 2023-08-30

## 2023-08-30 RX ORDER — VANCOMYCIN HCL 1 G
VIAL (EA) INTRAVENOUS
Refills: 0 | Status: COMPLETED | OUTPATIENT
Start: 2023-08-30 | End: 2023-08-31

## 2023-08-30 RX ORDER — DILTIAZEM HCL 120 MG
240 CAPSULE, EXT RELEASE 24 HR ORAL DAILY
Refills: 0 | Status: DISCONTINUED | OUTPATIENT
Start: 2023-08-30 | End: 2023-08-31

## 2023-08-30 RX ORDER — CEFTRIAXONE 500 MG/1
1000 INJECTION, POWDER, FOR SOLUTION INTRAMUSCULAR; INTRAVENOUS EVERY 24 HOURS
Refills: 0 | Status: DISCONTINUED | OUTPATIENT
Start: 2023-08-30 | End: 2023-08-31

## 2023-08-30 RX ORDER — VANCOMYCIN HCL 1 G
1500 VIAL (EA) INTRAVENOUS ONCE
Refills: 0 | Status: COMPLETED | OUTPATIENT
Start: 2023-08-30 | End: 2023-08-30

## 2023-08-30 RX ORDER — SODIUM CHLORIDE 9 MG/ML
1000 INJECTION, SOLUTION INTRAVENOUS
Refills: 0 | Status: DISCONTINUED | OUTPATIENT
Start: 2023-08-30 | End: 2023-08-30

## 2023-08-30 RX ORDER — VANCOMYCIN HCL 1 G
1000 VIAL (EA) INTRAVENOUS EVERY 12 HOURS
Refills: 0 | Status: DISCONTINUED | OUTPATIENT
Start: 2023-08-30 | End: 2023-08-30

## 2023-08-30 RX ORDER — HYDROMORPHONE HYDROCHLORIDE 2 MG/ML
0.5 INJECTION INTRAMUSCULAR; INTRAVENOUS; SUBCUTANEOUS
Refills: 0 | Status: DISCONTINUED | OUTPATIENT
Start: 2023-08-30 | End: 2023-08-30

## 2023-08-30 RX ORDER — VANCOMYCIN HCL 1 G
1000 VIAL (EA) INTRAVENOUS ONCE
Refills: 0 | Status: DISCONTINUED | OUTPATIENT
Start: 2023-08-30 | End: 2023-08-30

## 2023-08-30 RX ORDER — METOPROLOL TARTRATE 50 MG
25 TABLET ORAL ONCE
Refills: 0 | Status: DISCONTINUED | OUTPATIENT
Start: 2023-08-30 | End: 2023-08-30

## 2023-08-30 RX ORDER — BUMETANIDE 0.25 MG/ML
2 INJECTION INTRAMUSCULAR; INTRAVENOUS DAILY
Refills: 0 | Status: DISCONTINUED | OUTPATIENT
Start: 2023-08-30 | End: 2023-08-31

## 2023-08-30 RX ORDER — VANCOMYCIN HCL 1 G
1000 VIAL (EA) INTRAVENOUS ONCE
Refills: 0 | Status: COMPLETED | OUTPATIENT
Start: 2023-08-30 | End: 2023-08-30

## 2023-08-30 RX ORDER — VANCOMYCIN HCL 1 G
1500 VIAL (EA) INTRAVENOUS EVERY 12 HOURS
Refills: 0 | Status: COMPLETED | OUTPATIENT
Start: 2023-08-30 | End: 2023-08-31

## 2023-08-30 RX ORDER — VANCOMYCIN HCL 1 G
VIAL (EA) INTRAVENOUS
Refills: 0 | Status: DISCONTINUED | OUTPATIENT
Start: 2023-08-30 | End: 2023-08-30

## 2023-08-30 RX ORDER — METOPROLOL TARTRATE 50 MG
100 TABLET ORAL DAILY
Refills: 0 | Status: DISCONTINUED | OUTPATIENT
Start: 2023-08-30 | End: 2023-08-31

## 2023-08-30 RX ADMIN — Medication 300 MILLIGRAM(S): at 08:35

## 2023-08-30 RX ADMIN — Medication 1 APPLICATION(S): at 20:59

## 2023-08-30 RX ADMIN — Medication 50 MILLIGRAM(S): at 20:59

## 2023-08-30 RX ADMIN — CEFTRIAXONE 100 MILLIGRAM(S): 500 INJECTION, POWDER, FOR SOLUTION INTRAMUSCULAR; INTRAVENOUS at 19:40

## 2023-08-30 RX ADMIN — Medication 50 MILLIGRAM(S): at 22:23

## 2023-08-30 RX ADMIN — HYDROMORPHONE HYDROCHLORIDE 1 MILLIGRAM(S): 2 INJECTION INTRAMUSCULAR; INTRAVENOUS; SUBCUTANEOUS at 13:03

## 2023-08-30 RX ADMIN — Medication 5 MILLIGRAM(S): at 21:01

## 2023-08-30 RX ADMIN — Medication 300 MILLIGRAM(S): at 17:44

## 2023-08-30 RX ADMIN — Medication 20 MILLIGRAM(S): at 21:41

## 2023-08-30 RX ADMIN — ATORVASTATIN CALCIUM 80 MILLIGRAM(S): 80 TABLET, FILM COATED ORAL at 21:02

## 2023-08-30 RX ADMIN — Medication 250 MILLIGRAM(S): at 11:44

## 2023-08-30 NOTE — CONSULT NOTE ADULT - SUBJECTIVE AND OBJECTIVE BOX
YANETH ALMARAZ  48y, Female  Allergy: No Known Allergies      All historical available data reviewed.    HPI:  47 yo female, former smoker, with PMH of HTN, HLD, COVID 1/2023, h/o LE DVT, A-Fib s/p ablation 6 yrs ago @  [facility], has been off AC but now back on Eliquis. She was sent to ED by Dr. Frazier for pacemaker placement. Pt has ILR in place which noted a 6 second pause and bradycardiac to 20s this morning. She has hx of venous ulcers on b/l LE and had debridement with Dr Doyle for right side, she has a new wound on left side and follows with Dr Doyle, as per patient he was planning for debridement for it. It's draining pus as per patient.      Pt denies fever, chills, nausea, vomiting, abdominal pain, diarrhea, headache, dizziness, weakness, chest pain, SOB, back pain, LOC, trauma, urinary symptoms, cough, calf pain.  She was admitted in 6/2023 for palpitations with associated OLIVA, found to be in aflutter 2:1, was given Cardizem and was planned for cardioversion, however, patient converted to NSR prior to the cardioversion.  Pt was also managed by Dr. Doyle for chronic left posterior calf wound, for which he has been on multiple antibiotics. An outpatient ECHO on 5/6/23 showed evidence of moderate pulmonary HTN with estimated pressure of 55mmHg, EF 55-60%, moderate TR, and mild MR.     Vital Signs Last 24 Hrs  T(C): 36.7 (29 Aug 2023 01:24), Max: 37.1 (28 Aug 2023 22:23)  T(F): 98.1 (29 Aug 2023 01:24), Max: 98.8 (28 Aug 2023 22:23)  HR: 88 (29 Aug 2023 01:24) (88 - 122)  BP: 124/67 (29 Aug 2023 01:24) (124/67 - 151/82)  BP(mean): 85 (29 Aug 2023 01:24) (85 - 85)  RR: 18 (28 Aug 2023 22:23) (18 - 20)  SpO2: 99% (29 Aug 2023 01:24) (96% - 99%)  O2 Parameters below as of 28 Aug 2023 22:23  Patient On (Oxygen Delivery Method): room air         (29 Aug 2023 03:00)    FAMILY HISTORY:  FH: myocardial infarction (Father)      PAST MEDICAL & SURGICAL HISTORY:  HTN (hypertension)      Afib      Back pain      H/O Raynaud's syndrome      DVT, lower extremity      KANDICE on CPAP      History of laparoscopic cholecystectomy      H/O prior ablation treatment            VITALS:  T(F): 97.9, Max: 98.7 (08-29-23 @ 08:02)  HR: 94  BP: 120/62  RR: 18Vital Signs Last 24 Hrs  T(C): 36.6 (30 Aug 2023 00:14), Max: 37.1 (29 Aug 2023 08:02)  T(F): 97.9 (30 Aug 2023 00:14), Max: 98.7 (29 Aug 2023 08:02)  HR: 94 (30 Aug 2023 05:38) (94 - 122)  BP: 120/62 (30 Aug 2023 05:38) (112/69 - 142/101)  BP(mean): 79 (30 Aug 2023 05:38) (79 - 79)  RR: 18 (30 Aug 2023 05:38) (18 - 19)  SpO2: 97% (30 Aug 2023 00:14) (97% - 98%)    Parameters below as of 30 Aug 2023 00:14  Patient On (Oxygen Delivery Method): room air        TESTS & MEASUREMENTS:                        10.5   4.91  )-----------( 179      ( 29 Aug 2023 07:43 )             34.4     08-29    146  |  109  |  17  ----------------------------<  100<H>  3.6   |  29  |  0.6<L>    Ca    8.8      29 Aug 2023 07:43  Mg     2.1     08-29    TPro  6.9  /  Alb  3.9  /  TBili  0.6  /  DBili  x   /  AST  32  /  ALT  22  /  AlkPhos  106  08-28    LIVER FUNCTIONS - ( 28 Aug 2023 20:53 )  Alb: 3.9 g/dL / Pro: 6.9 g/dL / ALK PHOS: 106 U/L / ALT: 22 U/L / AST: 32 U/L / GGT: x             Urinalysis Basic - ( 29 Aug 2023 07:43 )    Color: x / Appearance: x / SG: x / pH: x  Gluc: 100 mg/dL / Ketone: x  / Bili: x / Urobili: x   Blood: x / Protein: x / Nitrite: x   Leuk Esterase: x / RBC: x / WBC x   Sq Epi: x / Non Sq Epi: x / Bacteria: x          RADIOLOGY & ADDITIONAL TESTS:  Personal review of radiological diagnostics performed  Echo and EKG results noted when applicable.     MEDICATIONS:  acetaminophen     Tablet .. 650 milliGRAM(s) Oral every 6 hours PRN  aluminum hydroxide/magnesium hydroxide/simethicone Suspension 30 milliLiter(s) Oral every 4 hours PRN  atorvastatin 80 milliGRAM(s) Oral at bedtime  melatonin 3 milliGRAM(s) Oral at bedtime PRN  oxycodone    5 mG/acetaminophen 325 mG 1 Tablet(s) Oral every 6 hours PRN  pantoprazole    Tablet 40 milliGRAM(s) Oral before breakfast      ANTIBIOTICS:

## 2023-08-30 NOTE — PRE-ANESTHESIA EVALUATION ADULT - NSATTENDATTESTRD_GEN_ALL_CORE
Physical Therapy Daily Treatment    Visit Count: 3  Plan of Care: 11/7/2018 Through: 1/3/2019  30-day reassessment due on or before 12/7/18    Insurance Information:  [id number]  ID#: [id number]  PT visits have been approved. Will update insurance information when available.     Referred by: Zafar Salgado MD; Next provider visit (if known/scheduled): none scheduled  Medical Diagnosis (from order):  724.4 (ICD-9-CM) - M54.16 (ICD-10-CM) - Lumbar radiculitis    Diagnosis Precautions: chronic neck pain, cervical radiculopathy, lumbar surgery in past x 3, right total knee replacement    Other precautions: EtOH dependence/tobacco abuse, generalized anxiety disorder, recurrent major depressive disorder, chronic pain syndrome, COPD, GERD, history of hepatitis C, brown sequard syndrome    SUBJECTIVE   Feeling a little sore through my back today on the left side.  I wasn't feeling good all week. Haven't eaten much. I had a cold that I am getting over. Haven't been doing my exercises as much due to not feeling well.    Current Pain (0-10 scale): 7  Functional Change: increased discomfort since last PT session    OBJECTIVE   Hunched posture in sitting on mat table  Crouched gait with use of SPC to ambulate back to clinic    Treatment     Therapeutic Exercise: 35 minutes  Nu-step, seat 3, arms #9, level 5.0 for 5 minutes   Exercise Repetitions Sets Position/Cues   Sitting hamstring stretch 2 30 seconds ANATOLIY   Sitting calf stretch with heel on floor - using strap 2 30 seconds  ANATOLIY    Supine single knee to chest 2  30 seconds  ANATOLIY   Hooklying lumbar rotation 2 30 seconds ANATOLIY   Seated lumbar flexion stretch 2 30 seconds ANATOLIY   Clamshell      Sidelying hip abduction      Seated LAQ 10 1 ANATOLIY   Seated marching 10 1 3 second holds   Seated lumbar extension      Seated heel raises/toe raises 10 1 Of each, ANATOLIY   Seated toe taps out to the side (hip abduction) 10 1    Seated heel taps forward 10 1           Comments: Educated to complete 5-10 reps as able to progress strength. Work into more repetitions as exercises become easier.  Educated regarding calf strengthening - eg heel raises to build up calf strength.    Skilled input: HEP selection, updated HEP. Education as listed above.     Home Program:   Exercise: Date issued Date DC Comments   Seated lumbar flexion stretch, seated SKTC stretch, seated hamstring stretch  Use of heating pad prior to stretching Eval     Clamshells, sidelie hip abduction, seated marching, lumbar extension in sitting, seated calf stretch with strap. 11-16-18     LAQ, seated heel and toe raises, seated toe taps sideway, seated heel taps forward 11-23-18                      Writer verbally educated the patient and received verbal consent from the patient on hand placement, positioning of patient, and techniques to be performed today including hand placement for cueing of exercises, education as above and how they are pertinent to the patient's plan of care.      Suggestions for next session as indicated: progress per plan of care,   progress per plan of care,  Assess response to HEP.  STM as needed to musculature surrounding spine.  Work into extension motion.   ANATOLIY LE strengthening progression as able.  TA bracing as able.      ASSESSMENT   Guillermo presenting to PT today with increased back pain levels since last PT session. Recently feeling ill over past week. Short session today as patient not feeling the best. Stated that he did not have the flu or fever recently, felt good with completing light exercise today. Added in a few new LE strengthening exercises - eg LAQ, heel and toe raises in sitting. Patient easily fatigued with exercise today. Did not add resistance bands to LE strengthening exercises. Finished session with stretching through low back on mat table. Patient appearing very receptive to HEP suggestions. Patient reporting feeling \"tired, more loose\" through low back following  end of session. Encouraged continued stretching through low back, ANATOLIY LEs at home to reduce soft-tissue strain through hips, low back/pelvis.    Continued skilled PT required to progress ANATOLIY LE and core strength, improve soft-tissue flexibility, reduce pain, improve activity tolerance, progress safe gait, with transition to independent HEP.    Pain after treatment (patient reported, 0-10 scale): 0,  \"tired, more loose, doesn't hurt as bad\"  Result of above outlined education: Verbalizes understanding and Needs reinforcement    THERAPY DAILY BILLING   Insurance: [employer] 2. [place] MEDICAID    Evaluation Procedures:  No evaluation codes were used on this date of service    Timed Procedures:  Therapeutic Exercise, 35 minutes    Untimed Procedures:  No untimed codes were used on this date of service     Total Treatment Time: 35 minutes    G-Code:  G-Code Score ABN form  reporting not required this treatment session  Modifier based on outcome measure(s)/functional testing/clinical judgement as listed above    Referring provider signature on file   The patient has been re-examined and I agree with the above assessment or I updated with my findings.

## 2023-08-30 NOTE — CHART NOTE - NSCHARTNOTEFT_GEN_A_CORE
Electrophysiology Brief Post-Operative Note    I have personally seen and examined the patient.  I agree with the history and physical which I have reviewed and noted any changes below.     DEVICE COMPANY:  -[employer]    PRE-OP DIAGNOSIS:   Sick Sinus Syndrome/sinus pauses    POST-OP DIAGNOSIS:   Sick Sinus Syndrome/sinus pauses    PROCEDURE:  Permanent Pacemaker Implant, Left bundle area pacing    Physician: NICO Frazier MD  Assistant: None    ANESTHESIA TYPE:  [  ]General Anesthesia  [X] Sedation  [X] Local/Regional    CONDITION  [  ] Critical  [  ] Serious  [  ]Fair  [X]Good    SPECIMENS REMOVED (IF APPLICABLE): None    IMPLANTS (IF APPLICABLE)  Dual Chamber Pacemaker Implant,  Left bundle area pacing      FINDINGS (see below)   -Successful Pacemaker implant  Left bundle area pacing   -No immediate complications   -Estimated Blood Loss: 20  mL   -Contrast used: 17 cc    PLAN OF CARE  - resume home dose of Metoprolol, diltiazem, and sildenafil  - Vancomyocin 1g IV q12 h for 2 doses  - Chest X-ray portable now  - Chest X-ray PA/Lat tomorrow at 6am  - Device interrogation tomorrow in am  - Discontinue Heparin, Lovenox, and NOACS for 72 hours  - No anticoagulation unless discussed with EP attending      FOLLOW-UP  -Outpatient follow-up with Electrophysiology in 3-4 weeks     [address] [place] [address] ([phone])[facility] [phone]

## 2023-08-30 NOTE — PROGRESS NOTE ADULT - SUBJECTIVE AND OBJECTIVE BOX
MEDICINE ATTENDING PROGRESS NOTE  HPI:  47 yo female, former smoker, with PMH of HTN, HLD, COVID 1/2023, h/o LE DVT, A-Fib s/p ablation 6 yrs ago @  [facility], has been off AC but now back on Eliquis. She was sent to ED by Dr. Frazier for pacemaker placement. Pt has ILR in place which noted a 6 second pause and bradycardiac to 20s this morning. She has hx of venous ulcers on b/l LE and had debridement with Dr Doyle for right side, she has a new wound on left side and follows with Dr Doyle, as per patient he was planning for debridement for it. It's draining pus as per patient.      Pt denies fever, chills, nausea, vomiting, abdominal pain, diarrhea, headache, dizziness, weakness, chest pain, SOB, back pain, LOC, trauma, urinary symptoms, cough, calf pain.  She was admitted in 6/2023 for palpitations with associated OLIVA, found to be in aflutter 2:1, was given Cardizem and was planned for cardioversion, however, patient converted to NSR prior to the cardioversion.  Pt was also managed by Dr. Doyle for chronic left posterior calf wound, for which he has been on multiple antibiotics. An outpatient ECHO on 5/6/23 showed evidence of moderate pulmonary HTN with estimated pressure of 55mmHg, EF 55-60%, moderate TR, and mild MR.     Vital Signs Last 24 Hrs  T(C): 36.7 (29 Aug 2023 01:24), Max: 37.1 (28 Aug 2023 22:23)  T(F): 98.1 (29 Aug 2023 01:24), Max: 98.8 (28 Aug 2023 22:23)  HR: 88 (29 Aug 2023 01:24) (88 - 122)  BP: 124/67 (29 Aug 2023 01:24) (124/67 - 151/82)  BP(mean): 85 (29 Aug 2023 01:24) (85 - 85)  RR: 18 (28 Aug 2023 22:23) (18 - 20)  SpO2: 99% (29 Aug 2023 01:24) (96% - 99%)  O2 Parameters below as of 28 Aug 2023 22:23  Patient On (Oxygen Delivery Method): room air         (29 Aug 2023 03:00)      Interval/Overnight Events        ROS  General: Denies fevers, chills, nightsweats, weight loss  HEENT: Denies headache, rhinorrhea, sore throat, eye pain  CV: Denies CP, palpitations  PULM: Denies SOB, cough  GI: Denies abdominal pain, diarrhea  : Denies dysuria, hematuria  MSK: Denies arthralgias  SKIN: Denies rash   NEURO: Denies paresthesias, weakness  PSYCH: Denies depression    MEDICATIONS  (STANDING):  atorvastatin 80 milliGRAM(s) Oral at bedtime  pantoprazole    Tablet 40 milliGRAM(s) Oral before breakfast  vancomycin  IVPB      vancomycin  IVPB 1500 milliGRAM(s) IV Intermittent once  vancomycin  IVPB 1500 milliGRAM(s) IV Intermittent every 12 hours  vancomycin  IVPB 1000 milliGRAM(s) IV Intermittent once    MEDICATIONS  (PRN):  acetaminophen     Tablet .. 650 milliGRAM(s) Oral every 6 hours PRN Temp greater or equal to 38C (100.4F), Mild Pain (1 - 3)  aluminum hydroxide/magnesium hydroxide/simethicone Suspension 30 milliLiter(s) Oral every 4 hours PRN Dyspepsia  melatonin 3 milliGRAM(s) Oral at bedtime PRN Insomnia  oxycodone    5 mG/acetaminophen 325 mG 1 Tablet(s) Oral every 6 hours PRN Severe Pain (7 - 10)    ANTIBIOTICS:  vancomycin  IVPB      vancomycin  IVPB 1500 milliGRAM(s) IV Intermittent once  vancomycin  IVPB 1500 milliGRAM(s) IV Intermittent every 12 hours  vancomycin  IVPB 1000 milliGRAM(s) IV Intermittent once      VITALS:  T(F): 98.2, Max: 98.5 (08-29-23 @ 17:02)  HR: 99  BP: 112/64  RR: 18Vital Signs Last 24 Hrs  T(C): 36.8 (30 Aug 2023 08:21), Max: 36.9 (29 Aug 2023 17:02)  T(F): 98.2 (30 Aug 2023 07:28), Max: 98.5 (29 Aug 2023 17:02)  HR: 99 (30 Aug 2023 08:21) (94 - 122)  BP: 112/64 (30 Aug 2023 08:21) (112/64 - 142/101)  BP(mean): 79 (30 Aug 2023 08:21) (79 - 79)  RR: 18 (30 Aug 2023 08:21) (18 - 19)  SpO2: 97% (30 Aug 2023 08:21) (97% - 98%)    Parameters below as of 30 Aug 2023 07:28  Patient On (Oxygen Delivery Method): room air     I&O's Summary    PHYSICAL EXAM:  Gen: NAD, resting in bed  HEENT: Normocephalic, atraumatic  Neck: supple, no lymphadenopathy  CV: Regular rate & regular rhythm  Lungs: CTABL no wheeze  Abdomen: Soft, NTND+ BS present  Ext: Warm, well perfused no CCE  Neuro: non focal, awake, CN II-XII intact   Skin: no rash, no erythema  Psych: no SI, HI, Hallucination     LABS/MICROBIOLOGY:                        11.1   5.85  )-----------( 213      ( 30 Aug 2023 07:02 )             35.8     08-30    143  |  107  |  17  ----------------------------<  112<H>  3.9   |  25  |  0.5<L>    Ca    8.9      30 Aug 2023 07:02  Mg     2.1     08-30    TPro  6.2  /  Alb  3.6  /  TBili  0.8  /  DBili  x   /  AST  27  /  ALT  20  /  AlkPhos  88  08-30      LIVER FUNCTIONS - ( 30 Aug 2023 07:02 )  Alb: 3.6 g/dL / Pro: 6.2 g/dL / ALK PHOS: 88 U/L / ALT: 20 U/L / AST: 27 U/L / GGT: x           PT/INR - ( 29 Aug 2023 07:43 )   PT: 13.60 sec;   INR: 1.19 ratio         PTT - ( 29 Aug 2023 07:43 )  PTT:30.5 sec  Urinalysis Basic - ( 30 Aug 2023 07:02 )    Color: x / Appearance: x / SG: x / pH: x  Gluc: 112 mg/dL / Ketone: x  / Bili: x / Urobili: x   Blood: x / Protein: x / Nitrite: x   Leuk Esterase: x / RBC: x / WBC x   Sq Epi: x / Non Sq Epi: x / Bacteria: x                IMAGING:  CXR  Xray Chest 1 View- PORTABLE-Urgent:   ACC: [id number] EXAM:  XR CHEST PORTABLE URGENT 1V   ORDERED BY: TERELL DEJESUS     PROCEDURE DATE:  08/28/2023          INTERPRETATION:  Clinical History / Reason for exam: Pain    Comparison : Chest radiograph June 7, 2023.    Technique/Positioning: Adequate.    Findings:    Support devices: None.    Cardiac/mediastinum/hilum: Cardiomegaly, unchanged.    Lung parenchyma/Pleura: Within normal limits.    Skeleton/soft tissues: Stable    Impression:    Cardiomegaly, unchanged.    No acute infiltrates.    --- End of Report ---            JOE ALDRIDGE MD; Attending Radiologist  This document has been electronically signed. Aug 29 2023  5:52AM (08-28-23 @ 21:41)      CT    CARDIOLOGY TESTING  QRS axis to [] ° and NSR at a rate of [] BPM. There was no atrial enlargement. There was no ventricular hypertrophy. There were no ST-T changes and all intervals were normal.    12 Lead ECG:   Ventricular Rate 113 BPM    Atrial Rate 113 BPM    P-R Interval 186 ms    QRS Duration 106 ms    Q-T Interval 340 ms    QTC Calculation(Bazett) 466 ms    P Axis 9 degrees    R Axis 64 degrees    T Axis 26 degrees    Diagnosis Line Sinus tachycardia  Incomplete right bundle branch block  Anterior infarct , age undetermined  Abnormal ECG    Confirmed by joe chapman ([id number]) on 8/29/2023 5:03:16 PM (08-28-23 @ 22:23)      ASSESSMENT/PLAN:    #Supportive Management:  Dispo:   DVT Ppx: GI Ppx: pantoprazole    Tablet 40 milliGRAM(s) Oral before breakfast  Diet:  Diet, NPO after Midnight:      NPO Start Date: 30-Aug-2023,   NPO Start Time: 23:59 (08-30-23 @ 08:04) [Hold]  Diet, DASH/TLC:   Sodium & Cholesterol Restricted  Consistent Carbohydrate Evening Snack (08-29-23 @ 08:57) [Active]  Diet, NPO after Midnight:      NPO Start Date: 29-Aug-2023,   NPO Start Time: 23:59 (08-29-23 @ 02:49) [Active]        Total time spent to complete patient's bedside assessment, review medical chart, discuss medical plan of care with covering medical team was more than 45 minutes with >50% of time spent face to face with patient, discussion with patient/family and/or coordination of care    Dante Auguste MD/Dorian  Attending Physician MEDICINE ATTENDING PROGRESS NOTE  49 yo female, former smoker, with PMH of HTN, HLD, COVID 1/2023, h/o LE DVT, A-Fib s/p ablation 6 yrs ago @  [facility], has been off AC but now back on Eliquis. She was sent to ED by Dr. Frazier for pacemaker placement for 6 min sinus pause on loop recorder. Admitted for further management    Interval/Overnight Events  - Patient has no acute events overnight  - Planned for pacemaker insertion today.      ROS  General: Denies fevers, chills, nightsweats, weight loss  HEENT: Denies headache, rhinorrhea, sore throat, eye pain  CV: Denies CP, palpitations  PULM: Denies SOB, cough  GI: Denies abdominal pain, diarrhea  : Denies dysuria, hematuria  MSK: Denies arthralgias  SKIN: Denies rash   NEURO: Denies paresthesias, weakness  PSYCH: Denies depression    MEDICATIONS  (STANDING):  atorvastatin 80 milliGRAM(s) Oral at bedtime  pantoprazole    Tablet 40 milliGRAM(s) Oral before breakfast  vancomycin  IVPB      vancomycin  IVPB 1500 milliGRAM(s) IV Intermittent once  vancomycin  IVPB 1500 milliGRAM(s) IV Intermittent every 12 hours  vancomycin  IVPB 1000 milliGRAM(s) IV Intermittent once    MEDICATIONS  (PRN):  acetaminophen     Tablet .. 650 milliGRAM(s) Oral every 6 hours PRN Temp greater or equal to 38C (100.4F), Mild Pain (1 - 3)  aluminum hydroxide/magnesium hydroxide/simethicone Suspension 30 milliLiter(s) Oral every 4 hours PRN Dyspepsia  melatonin 3 milliGRAM(s) Oral at bedtime PRN Insomnia  oxycodone    5 mG/acetaminophen 325 mG 1 Tablet(s) Oral every 6 hours PRN Severe Pain (7 - 10)    ANTIBIOTICS:  vancomycin  IVPB      vancomycin  IVPB 1500 milliGRAM(s) IV Intermittent once  vancomycin  IVPB 1500 milliGRAM(s) IV Intermittent every 12 hours  vancomycin  IVPB 1000 milliGRAM(s) IV Intermittent once      VITALS:  T(F): 98.2, Max: 98.5 (08-29-23 @ 17:02)  HR: 99  BP: 112/64  RR: 18Vital Signs Last 24 Hrs  T(C): 36.8 (30 Aug 2023 08:21), Max: 36.9 (29 Aug 2023 17:02)  T(F): 98.2 (30 Aug 2023 07:28), Max: 98.5 (29 Aug 2023 17:02)  HR: 99 (30 Aug 2023 08:21) (94 - 122)  BP: 112/64 (30 Aug 2023 08:21) (112/64 - 142/101)  BP(mean): 79 (30 Aug 2023 08:21) (79 - 79)  RR: 18 (30 Aug 2023 08:21) (18 - 19)  SpO2: 97% (30 Aug 2023 08:21) (97% - 98%)    Parameters below as of 30 Aug 2023 07:28  Patient On (Oxygen Delivery Method): room air     I&O's Summary    PHYSICAL EXAM:  Gen: NAD, resting in bed  HEENT: Normocephalic, atraumatic  Neck: supple, no lymphadenopathy  CV: Regular rate & regular rhythm  Lungs: CTABL no wheeze  Abdomen: Soft, NTND+ BS present  Ext: Warm, well perfused no CCE  Neuro: non focal, awake, CN II-XII intact   Skin: no rash, no erythema  Psych: no SI, HI, Hallucination     LABS/MICROBIOLOGY:                        11.1   5.85  )-----------( 213      ( 30 Aug 2023 07:02 )             35.8     08-30    143  |  107  |  17  ----------------------------<  112<H>  3.9   |  25  |  0.5<L>    Ca    8.9      30 Aug 2023 07:02  Mg     2.1     08-30    TPro  6.2  /  Alb  3.6  /  TBili  0.8  /  DBili  x   /  AST  27  /  ALT  20  /  AlkPhos  88  08-30      LIVER FUNCTIONS - ( 30 Aug 2023 07:02 )  Alb: 3.6 g/dL / Pro: 6.2 g/dL / ALK PHOS: 88 U/L / ALT: 20 U/L / AST: 27 U/L / GGT: x           PT/INR - ( 29 Aug 2023 07:43 )   PT: 13.60 sec;   INR: 1.19 ratio         PTT - ( 29 Aug 2023 07:43 )  PTT:30.5 sec  Urinalysis Basic - ( 30 Aug 2023 07:02 )    Color: x / Appearance: x / SG: x / pH: x  Gluc: 112 mg/dL / Ketone: x  / Bili: x / Urobili: x   Blood: x / Protein: x / Nitrite: x   Leuk Esterase: x / RBC: x / WBC x   Sq Epi: x / Non Sq Epi: x / Bacteria: x                IMAGING:  CXR  Xray Chest 1 View- PORTABLE-Urgent:   ACC: [id number] EXAM:  XR CHEST PORTABLE URGENT 1V   ORDERED BY: TERELL DEJESUS     PROCEDURE DATE:  08/28/2023          INTERPRETATION:  Clinical History / Reason for exam: Pain    Comparison : Chest radiograph June 7, 2023.    Technique/Positioning: Adequate.    Findings:    Support devices: None.    Cardiac/mediastinum/hilum: Cardiomegaly, unchanged.    Lung parenchyma/Pleura: Within normal limits.    Skeleton/soft tissues: Stable    Impression:    Cardiomegaly, unchanged.    No acute infiltrates.    --- End of Report ---            JOE ALDRIDGE MD; Attending Radiologist  This document has been electronically signed. Aug 29 2023  5:52AM (08-28-23 @ 21:41)      CT    CARDIOLOGY TESTING  QRS axis to [] ° and NSR at a rate of [] BPM. There was no atrial enlargement. There was no ventricular hypertrophy. There were no ST-T changes and all intervals were normal.    12 Lead ECG:   Ventricular Rate 113 BPM    Atrial Rate 113 BPM    P-R Interval 186 ms    QRS Duration 106 ms    Q-T Interval 340 ms    QTC Calculation(Bazett) 466 ms    P Axis 9 degrees    R Axis 64 degrees    T Axis 26 degrees    Diagnosis Line Sinus tachycardia  Incomplete right bundle branch block  Anterior infarct , age undetermined  Abnormal ECG    Confirmed by joe chapman ([id number]) on 8/29/2023 5:03:16 PM (08-28-23 @ 22:23)      ASSESSMENT/PLAN:    #Supportive Management:  Dispo:   DVT Ppx: GI Ppx: pantoprazole    Tablet 40 milliGRAM(s) Oral before breakfast  Diet:  Diet, NPO after Midnight:      NPO Start Date: 30-Aug-2023,   NPO Start Time: 23:59 (08-30-23 @ 08:04) [Hold]  Diet, DASH/TLC:   Sodium & Cholesterol Restricted  Consistent Carbohydrate Evening Snack (08-29-23 @ 08:57) [Active]  Diet, NPO after Midnight:      NPO Start Date: 29-Aug-2023,   NPO Start Time: 23:59 (08-29-23 @ 02:49) [Active]        Total time spent to complete patient's bedside assessment, review medical chart, discuss medical plan of care with covering medical team was more than 45 minutes with >50% of time spent face to face with patient, discussion with patient/family and/or coordination of care    Dante Auguste MD/Dorian  Attending Physician MEDICINE ATTENDING PROGRESS NOTE  47 yo female, former smoker, with PMH of HTN, HLD, COVID 1/2023, h/o LE DVT, A-Fib s/p ablation 6 yrs ago @  [facility], has been off AC but now back on Eliquis. She was sent to ED by Dr. Frazier for pacemaker placement for 6 min sinus pause on loop recorder. Admitted for further management    Interval/Overnight Events  - Patient has no acute events overnight  - Planned for pacemaker insertion today.      ROS  General: Denies fevers, chills, nightsweats, weight loss  HEENT: Denies headache, rhinorrhea, sore throat, eye pain  CV: Denies CP, palpitations  PULM: Denies SOB, cough  GI: Denies abdominal pain, diarrhea  : Denies dysuria, hematuria  MSK: Denies arthralgias  SKIN: Denies rash   NEURO: Denies paresthesias, weakness  PSYCH: Denies depression    MEDICATIONS  (STANDING):  atorvastatin 80 milliGRAM(s) Oral at bedtime  pantoprazole    Tablet 40 milliGRAM(s) Oral before breakfast  vancomycin  IVPB      vancomycin  IVPB 1500 milliGRAM(s) IV Intermittent once  vancomycin  IVPB 1500 milliGRAM(s) IV Intermittent every 12 hours  vancomycin  IVPB 1000 milliGRAM(s) IV Intermittent once    MEDICATIONS  (PRN):  acetaminophen     Tablet .. 650 milliGRAM(s) Oral every 6 hours PRN Temp greater or equal to 38C (100.4F), Mild Pain (1 - 3)  aluminum hydroxide/magnesium hydroxide/simethicone Suspension 30 milliLiter(s) Oral every 4 hours PRN Dyspepsia  melatonin 3 milliGRAM(s) Oral at bedtime PRN Insomnia  oxycodone    5 mG/acetaminophen 325 mG 1 Tablet(s) Oral every 6 hours PRN Severe Pain (7 - 10)    ANTIBIOTICS:  vancomycin  IVPB      vancomycin  IVPB 1500 milliGRAM(s) IV Intermittent once  vancomycin  IVPB 1500 milliGRAM(s) IV Intermittent every 12 hours  vancomycin  IVPB 1000 milliGRAM(s) IV Intermittent once      VITALS:  T(F): 98.2, Max: 98.5 (08-29-23 @ 17:02)  HR: 99  BP: 112/64  RR: 18Vital Signs Last 24 Hrs  T(C): 36.8 (30 Aug 2023 08:21), Max: 36.9 (29 Aug 2023 17:02)  T(F): 98.2 (30 Aug 2023 07:28), Max: 98.5 (29 Aug 2023 17:02)  HR: 99 (30 Aug 2023 08:21) (94 - 122)  BP: 112/64 (30 Aug 2023 08:21) (112/64 - 142/101)  BP(mean): 79 (30 Aug 2023 08:21) (79 - 79)  RR: 18 (30 Aug 2023 08:21) (18 - 19)  SpO2: 97% (30 Aug 2023 08:21) (97% - 98%)    Parameters below as of 30 Aug 2023 07:28  Patient On (Oxygen Delivery Method): room air     I&O's Summary    PHYSICAL EXAM:  Gen: NAD, resting in bed  HEENT: Normocephalic, atraumatic  Neck: supple, no lymphadenopathy  CV: Regular rate & regular rhythm  Lungs: CTABL no wheeze  Abdomen: Soft, NTND+ BS present  Ext: Warm, well perfused no CCE  Neuro: non focal, awake, CN II-XII intact   Skin: no rash, no erythema  Psych: no SI, HI, Hallucination     LABS/MICROBIOLOGY:                        11.1   5.85  )-----------( 213      ( 30 Aug 2023 07:02 )             35.8     08-30    143  |  107  |  17  ----------------------------<  112<H>  3.9   |  25  |  0.5<L>    Ca    8.9      30 Aug 2023 07:02  Mg     2.1     08-30    TPro  6.2  /  Alb  3.6  /  TBili  0.8  /  DBili  x   /  AST  27  /  ALT  20  /  AlkPhos  88  08-30      LIVER FUNCTIONS - ( 30 Aug 2023 07:02 )  Alb: 3.6 g/dL / Pro: 6.2 g/dL / ALK PHOS: 88 U/L / ALT: 20 U/L / AST: 27 U/L / GGT: x           PT/INR - ( 29 Aug 2023 07:43 )   PT: 13.60 sec;   INR: 1.19 ratio         PTT - ( 29 Aug 2023 07:43 )  PTT:30.5 sec  Urinalysis Basic - ( 30 Aug 2023 07:02 )    Color: x / Appearance: x / SG: x / pH: x  Gluc: 112 mg/dL / Ketone: x  / Bili: x / Urobili: x   Blood: x / Protein: x / Nitrite: x   Leuk Esterase: x / RBC: x / WBC x   Sq Epi: x / Non Sq Epi: x / Bacteria: x                IMAGING:  CXR  Xray Chest 1 View- PORTABLE-Urgent:   ACC: [id number] EXAM:  XR CHEST PORTABLE URGENT 1V   ORDERED BY: TERELL DEJESUS     PROCEDURE DATE:  08/28/2023          INTERPRETATION:  Clinical History / Reason for exam: Pain    Comparison : Chest radiograph June 7, 2023.    Technique/Positioning: Adequate.    Findings:    Support devices: None.    Cardiac/mediastinum/hilum: Cardiomegaly, unchanged.    Lung parenchyma/Pleura: Within normal limits.    Skeleton/soft tissues: Stable    Impression:    Cardiomegaly, unchanged.    No acute infiltrates.    --- End of Report ---            JOE ALDRIDGE MD; Attending Radiologist  This document has been electronically signed. Aug 29 2023  5:52AM (08-28-23 @ 21:41)      CT    CARDIOLOGY TESTING  QRS axis to [] ° and NSR at a rate of [] BPM. There was no atrial enlargement. There was no ventricular hypertrophy. There were no ST-T changes and all intervals were normal.    12 Lead ECG:   Ventricular Rate 113 BPM    Atrial Rate 113 BPM    P-R Interval 186 ms    QRS Duration 106 ms    Q-T Interval 340 ms    QTC Calculation(Bazett) 466 ms    P Axis 9 degrees    R Axis 64 degrees    T Axis 26 degrees    Diagnosis Line Sinus tachycardia  Incomplete right bundle branch block  Anterior infarct , age undetermined  Abnormal ECG    Confirmed by joe chapman ([id number]) on 8/29/2023 5:03:16 PM (08-28-23 @ 22:23)      ASSESSMENT/PLAN:  47 yo female, former smoker, with PMH of HTN, HLD, COVID 1/2023, h/o LE DVT, A-Fib s/p ablation 6 yrs ago @  [facility], has been off AC but now back on Eliquis. She was sent to ED by Dr. Frazier for pacemaker placement. Pt has ILR in place which noted a 6 second pause and bradycardiac to 20s this morning. She has hx of venous ulcers on b/l LE and had debridement with Dr Doyle for right side, she has a new wound on left side and follows with Dr Doyle, as per patient he was planning for debridement. It's draining pus as per patient.     # Sinus pause of 6 seconds, bradycardia to 20s  #A-flutter w/ RVR  #s/p ablation hx chronic A-Fib on Eliquis  - pacemaker placement today  - f/u preop labs  - hold eliquis for now; can resume after procedure  - will hold metoprolol and sildenafil for now, resume as per Cardiology  - will hold of Bumex for the procedure to avoid hypotension, resume post op  - trops negative  - mildly elevated BNP 1458    #Left leg wound with drainage  -f/u Dr. Doyle  -burn consult for eval  - plan for debridement    # pulmonary HTN  - Moderate Pulmo HTN  - c/w sildenafil for Raynaud's and Pul HTN    #Raynaud's  -on sildenafil,  - can hold for now, resume after the procedure    #HTN, uncontrolled  -on metoprolol succinate 100 mg, and Bumex 2 mg QD  -can resume after the procedure    #KANDICE  -continue CPAP     #Supportive Management:  Dispo:   DVT Ppx: GI Ppx: pantoprazole    Tablet 40 milliGRAM(s) Oral before breakfast  Diet:  Diet, NPO after Midnight:      NPO Start Date: 30-Aug-2023,   NPO Start Time: 23:59 (08-30-23 @ 08:04) [Hold]  Diet, DASH/TLC:   Sodium & Cholesterol Restricted  Consistent Carbohydrate Evening Snack (08-29-23 @ 08:57) [Active]  Diet, NPO after Midnight:      NPO Start Date: 29-Aug-2023,   NPO Start Time: 23:59 (08-29-23 @ 02:49) [Active]        Total time spent to complete patient's bedside assessment, review medical chart, discuss medical plan of care with covering medical team was more than 45 minutes with >50% of time spent face to face with patient, discussion with patient/family and/or coordination of care    Dante Auguste MD/Dorian  Attending Physician

## 2023-08-30 NOTE — CONSULT NOTE ADULT - ASSESSMENT
49 yo female, former smoker, with PMH of HTN, HLD, COVID 1/2023, h/o LE DVT, A-Fib s/p ablation 6 yrs ago @  [facility], has been off AC but now back on Eliquis. She was sent to ED by Dr. Frazier for pacemaker placement. Pt has ILR in place which noted a 6 second pause and bradycardiac to 20s this morning. She has hx of venous ulcers on b/l LE and had debridement with Dr Doyle for right side, she has a new wound on left side and follows with Dr Doyle, as per patient he was planning for debridement for it. It's draining pus as per patient.     IMPRESSION/RECOMMENDATIONS  49 yo female, former smoker, with PMH of HTN, HLD, COVID 1/2023, h/o LE DVT, A-Fib s/p ablation 6 yrs ago @  [facility], has been off AC but now back on Eliquis. She was sent to ED by Dr. Frazier for pacemaker placement. Pt has ILR in place which noted a 6 second pause and bradycardiac to 20s this morning. She has hx of venous ulcers on b/l LE and had debridement with Dr Doyle for right side, she has a new wound on left side and follows with Dr Doyle, as per patient he was planning for debridement for it. It's draining pus as per patient.     IMPRESSION/RECOMMENDATIONS   8/30 S/p PCM 49 yo female, former smoker, with PMH of HTN, HLD, COVID 1/2023, h/o LE DVT, A-Fib s/p ablation 6 yrs ago @  [facility], has been off AC but now back on Eliquis. She was sent to ED by Dr. Frazier for pacemaker placement. Pt has ILR in place which noted a 6 second pause and bradycardiac to 20s this morning. She has hx of venous ulcers on b/l LE and had debridement with Dr Doyle for right side, she has a new wound on left side and follows with Dr Doyle, as per patient he was planning for debridement for it. It's draining pus as per patient.     IMPRESSION/RECOMMENDATIONS   8/30 S/p PCM  LLE with chronic venous stasis dermatitis with superficial ulcer LLE with no surrounding cellulitis/abscess  6/8 WCx S mitis, P aeruginosa  WBC 4.9    -local care with Silverdene  -po Doxycycline 100 mg q12h for 8 more days    Please do not hesitate to recall ID if any questions arise either through [employer] or through [employer]

## 2023-08-31 ENCOUNTER — TRANSCRIPTION ENCOUNTER (OUTPATIENT)
Age: 48
End: 2023-08-31

## 2023-08-31 VITALS
RESPIRATION RATE: 21 BRPM | SYSTOLIC BLOOD PRESSURE: 117 MMHG | HEART RATE: 126 BPM | DIASTOLIC BLOOD PRESSURE: 73 MMHG | TEMPERATURE: 99 F

## 2023-08-31 DIAGNOSIS — L03.90 CELLULITIS, UNSPECIFIED: ICD-10-CM

## 2023-08-31 LAB
ALBUMIN SERPL ELPH-MCNC: 3.5 G/DL — SIGNIFICANT CHANGE UP (ref 3.5–5.2)
ALP SERPL-CCNC: 95 U/L — SIGNIFICANT CHANGE UP (ref 30–115)
ALT FLD-CCNC: 20 U/L — SIGNIFICANT CHANGE UP (ref 0–41)
ANION GAP SERPL CALC-SCNC: 10 MMOL/L — SIGNIFICANT CHANGE UP (ref 7–14)
AST SERPL-CCNC: 27 U/L — SIGNIFICANT CHANGE UP (ref 0–41)
BASOPHILS # BLD AUTO: 0.04 K/UL — SIGNIFICANT CHANGE UP (ref 0–0.2)
BASOPHILS NFR BLD AUTO: 0.5 % — SIGNIFICANT CHANGE UP (ref 0–1)
BILIRUB SERPL-MCNC: 0.9 MG/DL — SIGNIFICANT CHANGE UP (ref 0.2–1.2)
BUN SERPL-MCNC: 13 MG/DL — SIGNIFICANT CHANGE UP (ref 10–20)
CALCIUM SERPL-MCNC: 9 MG/DL — SIGNIFICANT CHANGE UP (ref 8.4–10.4)
CHLORIDE SERPL-SCNC: 105 MMOL/L — SIGNIFICANT CHANGE UP (ref 98–110)
CO2 SERPL-SCNC: 25 MMOL/L — SIGNIFICANT CHANGE UP (ref 17–32)
CREAT SERPL-MCNC: 0.6 MG/DL — LOW (ref 0.7–1.5)
EGFR: 109 ML/MIN/1.73M2 — SIGNIFICANT CHANGE UP
EOSINOPHIL # BLD AUTO: 0.22 K/UL — SIGNIFICANT CHANGE UP (ref 0–0.7)
EOSINOPHIL NFR BLD AUTO: 2.6 % — SIGNIFICANT CHANGE UP (ref 0–8)
GLUCOSE SERPL-MCNC: 108 MG/DL — HIGH (ref 70–99)
HCT VFR BLD CALC: 34.7 % — LOW (ref 37–47)
HGB BLD-MCNC: 10.6 G/DL — LOW (ref 12–16)
IMM GRANULOCYTES NFR BLD AUTO: 0.3 % — SIGNIFICANT CHANGE UP (ref 0.1–0.3)
LYMPHOCYTES # BLD AUTO: 0.81 K/UL — LOW (ref 1.2–3.4)
LYMPHOCYTES # BLD AUTO: 9.4 % — LOW (ref 20.5–51.1)
MAGNESIUM SERPL-MCNC: 2 MG/DL — SIGNIFICANT CHANGE UP (ref 1.8–2.4)
MCHC RBC-ENTMCNC: 28 PG — SIGNIFICANT CHANGE UP (ref 27–31)
MCHC RBC-ENTMCNC: 30.5 G/DL — LOW (ref 32–37)
MCV RBC AUTO: 91.8 FL — SIGNIFICANT CHANGE UP (ref 81–99)
MONOCYTES # BLD AUTO: 1.39 K/UL — HIGH (ref 0.1–0.6)
MONOCYTES NFR BLD AUTO: 16.1 % — HIGH (ref 1.7–9.3)
NEUTROPHILS # BLD AUTO: 6.13 K/UL — SIGNIFICANT CHANGE UP (ref 1.4–6.5)
NEUTROPHILS NFR BLD AUTO: 71.1 % — SIGNIFICANT CHANGE UP (ref 42.2–75.2)
NRBC # BLD: 0 /100 WBCS — SIGNIFICANT CHANGE UP (ref 0–0)
PLATELET # BLD AUTO: 194 K/UL — SIGNIFICANT CHANGE UP (ref 130–400)
PMV BLD: 9.9 FL — SIGNIFICANT CHANGE UP (ref 7.4–10.4)
POTASSIUM SERPL-MCNC: 4.2 MMOL/L — SIGNIFICANT CHANGE UP (ref 3.5–5)
POTASSIUM SERPL-SCNC: 4.2 MMOL/L — SIGNIFICANT CHANGE UP (ref 3.5–5)
PROT SERPL-MCNC: 6.3 G/DL — SIGNIFICANT CHANGE UP (ref 6–8)
RBC # BLD: 3.78 M/UL — LOW (ref 4.2–5.4)
RBC # FLD: 18.8 % — HIGH (ref 11.5–14.5)
SODIUM SERPL-SCNC: 140 MMOL/L — SIGNIFICANT CHANGE UP (ref 135–146)
WBC # BLD: 8.62 K/UL — SIGNIFICANT CHANGE UP (ref 4.8–10.8)
WBC # FLD AUTO: 8.62 K/UL — SIGNIFICANT CHANGE UP (ref 4.8–10.8)

## 2023-08-31 PROCEDURE — 99239 HOSP IP/OBS DSCHRG MGMT >30: CPT

## 2023-08-31 PROCEDURE — 99221 1ST HOSP IP/OBS SF/LOW 40: CPT

## 2023-08-31 PROCEDURE — 71046 X-RAY EXAM CHEST 2 VIEWS: CPT | Mod: 26

## 2023-08-31 PROCEDURE — 93010 ELECTROCARDIOGRAM REPORT: CPT

## 2023-08-31 PROCEDURE — 99024 POSTOP FOLLOW-UP VISIT: CPT

## 2023-08-31 RX ORDER — CHLORHEXIDINE GLUCONATE 213 G/1000ML
1 SOLUTION TOPICAL DAILY
Refills: 0 | Status: DISCONTINUED | OUTPATIENT
Start: 2023-08-31 | End: 2023-08-31

## 2023-08-31 RX ORDER — METOPROLOL TARTRATE 50 MG
5 TABLET ORAL ONCE
Refills: 0 | Status: COMPLETED | OUTPATIENT
Start: 2023-08-31 | End: 2023-08-31

## 2023-08-31 RX ORDER — METOPROLOL TARTRATE 50 MG
100 TABLET ORAL DAILY
Refills: 0 | Status: DISCONTINUED | OUTPATIENT
Start: 2023-08-31 | End: 2023-08-31

## 2023-08-31 RX ORDER — ACETAMINOPHEN 500 MG
650 TABLET ORAL EVERY 6 HOURS
Refills: 0 | Status: DISCONTINUED | OUTPATIENT
Start: 2023-08-31 | End: 2023-08-31

## 2023-08-31 RX ORDER — LIDOCAINE 4 G/100G
1 CREAM TOPICAL EVERY 24 HOURS
Refills: 0 | Status: DISCONTINUED | OUTPATIENT
Start: 2023-08-31 | End: 2023-08-31

## 2023-08-31 RX ORDER — APIXABAN 2.5 MG/1
1 TABLET, FILM COATED ORAL
Qty: 60 | Refills: 0
Start: 2023-08-31 | End: 2023-09-29

## 2023-08-31 RX ADMIN — Medication 5 MILLIGRAM(S): at 02:17

## 2023-08-31 RX ADMIN — Medication 20 MILLIGRAM(S): at 13:45

## 2023-08-31 RX ADMIN — Medication 650 MILLIGRAM(S): at 17:01

## 2023-08-31 RX ADMIN — CHLORHEXIDINE GLUCONATE 1 APPLICATION(S): 213 SOLUTION TOPICAL at 11:18

## 2023-08-31 RX ADMIN — Medication 650 MILLIGRAM(S): at 12:30

## 2023-08-31 RX ADMIN — LIDOCAINE 1 PATCH: 4 CREAM TOPICAL at 12:36

## 2023-08-31 RX ADMIN — Medication 100 MILLIGRAM(S): at 17:01

## 2023-08-31 RX ADMIN — Medication 650 MILLIGRAM(S): at 11:15

## 2023-08-31 RX ADMIN — Medication 240 MILLIGRAM(S): at 03:57

## 2023-08-31 RX ADMIN — BUMETANIDE 2 MILLIGRAM(S): 0.25 INJECTION INTRAMUSCULAR; INTRAVENOUS at 05:25

## 2023-08-31 RX ADMIN — Medication 100 MILLIGRAM(S): at 12:36

## 2023-08-31 RX ADMIN — Medication 300 MILLIGRAM(S): at 05:25

## 2023-08-31 RX ADMIN — PANTOPRAZOLE SODIUM 40 MILLIGRAM(S): 20 TABLET, DELAYED RELEASE ORAL at 05:25

## 2023-08-31 RX ADMIN — Medication 100 MILLIGRAM(S): at 11:14

## 2023-08-31 RX ADMIN — Medication 20 MILLIGRAM(S): at 05:25

## 2023-08-31 RX ADMIN — Medication 1 APPLICATION(S): at 11:15

## 2023-08-31 NOTE — DISCHARGE NOTE PROVIDER - NSDCCPTREATMENT_GEN_ALL_CORE_FT
PRINCIPAL PROCEDURE  Procedure: Insertion, cardiac pacemaker, dual chamber  Findings and Treatment: - You should restart your Eliquis on Saturday 9/2 AM.   - Do not shower for 5 days.  - Do not drive or operate heavy machinery for 1 week.  - Do not submerge in water (example: baths, swimming) for 1 month.  - Do not lift your left arm greater than shoulder height for 6 weeks.  - Do not lift anything heavier than 5-10 lbs with your left arm for 6 weeks.  - Any sudden swelling, redness, fever, discharge, or severe pain, call the Electrophysiology Office at [phone].

## 2023-08-31 NOTE — DISCHARGE NOTE PROVIDER - CARE PROVIDER_API CALL
Rashaun Hartley  Cardiovascular Disease  [address], [address]  [place] [address]  Phone: [phone]  Fax: [phone]  Follow Up Time: 2 weeks    Teo Frazier  Cardiovascular Disease  [address], [address]  [place] [address]  Phone: [phone]  Fax: [phone]  Scheduled Appointment: 09/18/2023 01:00 PM

## 2023-08-31 NOTE — PROGRESS NOTE ADULT - ASSESSMENT
EP: Freddie  Cards: Odilia    47 yo female, former smoker, with PMH of HTN, HLD, of venous ulcers on b/l LE s/p debridement, COVID 1/2023, h/o LE DVT, A-Fib s/p ablation 6 yrs ago @  [facility], on Eliquis. She was sent to ED by Dr. Frazier for pacemaker placement. Pt has ILR in place which noted a 6 second pause and bradycardiac to 20s. Pt underwent Dc PPM on 8/30/2023. No immediate postop complications noted.     TSH:  0.28        Impression:  Sinus node dysfunction 6 sec pause, adriana 20s on ILR  S.p DC PPM ([employer])  Hx HTN, HLD  Hx venous stasis ulcers  Hx LE DVT  Hx PAF s/p ablation ([facility])    Plan:  - Interrogation complete:   - CXR  - EKG noted  - HOLD any heparin, lovenox or DOACs for 48 hr following procedure to minimize risk of hematoma  - May resume PO diet  - Cont home doses of Diltiazem and metoprolol   - Tylenol for pain  - May resume Eliquis tomorrow evening  - Ambulate as tolerated  - Appreciate ID recommendations  - Plan per primary team      Discharge Instructions:  - May resume blood thinner on Friday evening  - No heavy lifting > 5 lbs. for 4-6 weeks  - Do not raise your arm above shoulder level for 4-6 weeks.   - Do not shower for 5 days, may resume on Monday  - No submerging in water for 1 month  - Leave steri-strips in place, they will fall off on their own  - No driving for 1 week       EP: Freddie  Cards: Odilia    47 yo female, former smoker, with PMH of HTN, HLD, of venous ulcers on b/l LE s/p debridement, COVID 1/2023, h/o LE DVT, A-Fib s/p ablation 6 yrs ago @  [facility], on Eliquis. She was sent to ED by Dr. Frazier for pacemaker placement. Pt has ILR in place which noted a 6 second pause and bradycardiac to 20s. Pt underwent Dc PPM-Left bundle area pacing on 8/30/2023. No immediate postop complications noted.     TSH:  0.28        Impression:  Sinus node dysfunction 6 sec pause, adriana 20s on ILR  S.p DC PPM -Left bundle area pacing (Medtronic)  Hx HTN, HLD  Hx venous stasis ulcers  Hx LE DVT  Hx PAF s/p ablation ([facility])    Plan:  - Interrogation complete: DDD  bpm, no events, normal functioning device. Not dependent.   - CXR  - EKG noted  - HOLD any heparin, lovenox or DOACs for 48 hr following procedure to minimize risk of hematoma  - May resume PO diet  - Cont home doses of Diltiazem and metoprolol   - Tylenol for pain  - May resume Eliquis tomorrow evening  - Ambulate as tolerated  - Appreciate ID recommendations  - Fu Dr. Doyle for wound care  - Plan per primary team      Discharge Instructions:  - May resume blood thinner on Friday evening  - No heavy lifting > 5 lbs. for 4-6 weeks  - Do not raise your arm above shoulder level for 4-6 weeks.   - Do not shower for 5 days, may resume on Monday  - No submerging in water for 1 month  - Leave steri-strips in place, they will fall off on their own  - No driving for 1 week       EP: Freddie  Cards: Odilia    49 yo female, former smoker, with PMH of HTN, HLD, of venous ulcers on b/l LE s/p debridement, COVID 1/2023, h/o LE DVT, A-Fib s/p ablation 6 yrs ago @  [facility], on Eliquis. She was sent to ED by Dr. Frazier for pacemaker placement. Pt has ILR in place which noted a 6 second pause and bradycardiac to 20s. Pt underwent Dc PPM-Left bundle area pacing on 8/30/2023. No immediate postop complications noted.     TSH:  0.28        Impression:  Sinus node dysfunction 6 sec pause, adriana 20s on ILR  S.p DC PPM -Left bundle area pacing ([employer]tr[employer])  Hx HTN, HLD  Hx venous stasis ulcers  Hx LE DVT  Hx PAF s/p ablation ([facility])    Plan:  - Interrogation complete: DDD  bpm, no events, normal functioning device. Not dependent.   - CXR no pneumothorax  - EKG noted  - HOLD any heparin, lovenox or DOACs for 48 hr following procedure to minimize risk of hematoma  - May resume PO diet  - Cont home doses of Diltiazem and metoprolol   - Tylenol for pain  - May resume Eliquis tomorrow evening  - Ambulate as tolerated  - Appreciate ID recommendations  - Fu Dr. Doyle for wound care  - Plan per primary team      Discharge Instructions:  - May resume blood thinner on Friday evening  - No heavy lifting > 5 lbs. for 4-6 weeks  - Do not raise your arm above shoulder level for 4-6 weeks.   - Do not shower for 5 days, may resume on Monday  - No submerging in water for 1 month  - Leave steri-strips in place, they will fall off on their own  - No driving for 1 week

## 2023-08-31 NOTE — DISCHARGE NOTE PROVIDER - HOSPITAL COURSE
48yoF patient of Dr. Hartley with Afib s/p ablation at [facility] ~6 years ago, HTN, HLD, prior tobacco use, left lower extremity DVT, and chronic nonhealing ulcer of the LLE for which she is followed by Dr. Doyle who was sent to the ED for a 6 second pause and bradycardia to 20 bpm, found on her ILR. TSH 0.28 (wnl).     Patient underwent placement of a Medtronic dual chamber pacemaker on 8/30/23. Post-operatively, she was in Afib with RVR and required metoprolol 5 mg IV x 2 and metoprolol 50 mg PO x 2. She has now been resumed on her home metoprolol and diltiazem with improvement in her HR.     During her hospitalization, patient was evaluated by Dr. Doyle, who recommended local wound care and outpatient follow-up. Infectious disease recommended a longer course of doxycycline 100 mg q12h, and so patient will be discharged on both Keflex post-device placement and doxycycline for her ulcer. Additional work-up of her ulcer included arterial and venous Dopplers of the lower extremities, both of which were unremarkable. Outpatient records were obtained from Dr. Henning, and the notes state the patient had a venogram in 2022 that demonstrated compression of the external iliac vein, no stent placed.     Patient is medically stable for discharge home. 48yoF patient of Dr. Hartley with Afib s/p ablation at [facility] ~6 years ago, HTN, HLD, prior tobacco use, left lower extremity DVT, and chronic nonhealing ulcer of the LLE for which she is followed by Dr. Doyle who was sent to the ED for a 6 second pause and bradycardia to 20 bpm, found on her ILR. TSH 0.28 (wnl).     Patient underwent placement of a Medtronic dual chamber pacemaker on 8/30/23. Post-operatively, she was in Afib with RVR and required metoprolol 5 mg IV x 2 and metoprolol 50 mg PO x 2. She has now been resumed on her home metoprolol and diltiazem with improvement in her HR.     During her hospitalization, patient was evaluated by Dr. Doyle, who recommended local wound care and outpatient follow-up. Infectious disease recommended a longer course of doxycycline 100 mg q12h. Additional work-up of her ulcer included arterial and venous Dopplers of the lower extremities, both of which were unremarkable. Outpatient records were obtained from Dr. Henning, and the notes state the patient had a venogram in 2022 that demonstrated compression of the external iliac vein, no stent placed.     Patient is medically stable for discharge home. 48yoF patient of Dr. Hartley, former smoker, with PMH of HTN, HLD, HFpEF (EF 55-60% on TTE 5/6/23), COVID 1/2023, h/o LE DVT, Paroxsymal Afib s/p ablation 6 yrs ago @  [facility], Raynaud's syndrome, and chronic nonhealing ulcer of the LLE for which she is followed by Dr. Doyle who was sent to the ED for a 6 second pause and bradycardia to 20 bpm, found on her MCOT monitor. TSH 0.28 (wnl).     On 8/30/23 patient underwent successful Medtronic dual chamber pacemaker implantation. Post-operatively, she was in Afib with RVR and required metoprolol 5 mg IV x 2 and metoprolol 50 mg PO x 2. She has now been resumed on her home metoprolol and diltiazem with improvement in her HR.  Patient was monitored overnight. On POD 1 patient remains HD stable with no complaints. Examination of PPM pocket is C/D/I with no hematoma or erythema. Device interrogation reveals normal device function and CXR was negative for pneumothorax. Patient is being DC home in stable condition.    During her hospitalization, patient was evaluated by Dr. Doyle, who recommended local wound care and outpatient follow-up. Infectious disease recommended a longer course of doxycycline 100 mg q12h. Additional work-up of her ulcer included arterial and venous Dopplers of the lower extremities, both of which were unremarkable. Outpatient records were obtained from Dr. Blunt, and the notes state the patient had a venogram in 2022 that demonstrated compression of the external iliac vein, no stent placed.     Patient is medically stable for discharge home.

## 2023-08-31 NOTE — PROGRESS NOTE ADULT - SUBJECTIVE AND OBJECTIVE BOX
INTERVAL HPI/OVERNIGHT EVENTS:  Pt is POD#1 DC PPM placement. No acute events over night, In AF 120s, home dose metoprolol and diltiazem restarted last night. Pressure dressing removed.   Patient denies fever, chills, dizziness, syncope, chest pain, palpitations, SOB, cough, abd pain, n/v/d/c, dysuria, hematuria or unusual rash.     MEDICATIONS  (STANDING):  atorvastatin 80 milliGRAM(s) Oral at bedtime  buMETAnide 2 milliGRAM(s) Oral daily  cefTRIAXone   IVPB 1000 milliGRAM(s) IV Intermittent every 24 hours  chlorhexidine 2% Cloths 1 Application(s) Topical daily  diltiazem    milliGRAM(s) Oral daily  metoprolol succinate  milliGRAM(s) Oral daily  pantoprazole    Tablet 40 milliGRAM(s) Oral before breakfast  sildenafil (REVATIO) 20 milliGRAM(s) Oral three times a day  silver sulfADIAZINE 1% Cream 1 Application(s) Topical daily    MEDICATIONS  (PRN):  aluminum hydroxide/magnesium hydroxide/simethicone Suspension 30 milliLiter(s) Oral every 4 hours PRN Dyspepsia  melatonin 3 milliGRAM(s) Oral at bedtime PRN Insomnia  oxycodone    5 mG/acetaminophen 325 mG 1 Tablet(s) Oral every 6 hours PRN Severe Pain (7 - 10)      Allergies    No Known Allergies    Intolerances        REVIEW OF SYSTEMS    [x] A ten-point review of systems was otherwise negative except as noted.  [ ] Due to altered mental status/intubation, subjective information were not able to be obtained from the patient. History was obtained, to the extent possible, from review of the chart and collateral sources of information.      Vital Signs Last 24 Hrs  T(C): 36.9 (31 Aug 2023 07:18), Max: 37.2 (31 Aug 2023 00:19)  T(F): 98.5 (31 Aug 2023 07:18), Max: 98.9 (31 Aug 2023 00:19)  HR: 118 (31 Aug 2023 07:18) (98 - 128)  BP: 108/56 (31 Aug 2023 07:18) (90/56 - 151/92)  BP(mean): 72 (31 Aug 2023 07:18) (66 - 90)  RR: 18 (31 Aug 2023 07:18) (16 - 20)  SpO2: 98% (31 Aug 2023 00:19) (95% - 98%)    Parameters below as of 31 Aug 2023 00:19  Patient On (Oxygen Delivery Method): BiPAP/CPAP, pt own cpap        08-30-23 @ 07:01  -  08-31-23 @ 07:00  --------------------------------------------------------  IN: 1540 mL / OUT: 900 mL / NET: 640 mL    08-31-23 @ 07:01  -  08-31-23 @ 09:21  --------------------------------------------------------  IN: 0 mL / OUT: 900 mL / NET: -900 mL        Physical Exam  GENERAL: Obese female, In no apparent distress, well nourished, and hydrated.  HEART: Irregular rate and rhythm; No murmurs, rubs, or gallops.  PULMONARY: Clear to auscultation and perfusion.  No rales, wheezing, or rhonchi bilaterally.  ABDOMEN: Rounded, Soft, Nontender, Nondistended; Bowel sounds present  EXTREMITIES:  B/l LE venous stasis ulcers/dermatitis. 2+ Peripheral Pulses, No clubbing, cyanosis  NEUROLOGICAL: AO x4 ,FONSECA, speech clear    LABS:                        10.6   8.62  )-----------( 194      ( 31 Aug 2023 05:16 )             34.7     08-31    140  |  105  |  13  ----------------------------<  108<H>  4.2   |  25  |  0.6<L>    Ca    9.0      31 Aug 2023 05:16  Mg     2.0     08-31    TPro  6.3  /  Alb  3.5  /  TBili  0.9  /  DBili  x   /  AST  27  /  ALT  20  /  AlkPhos  95  08-31      Urinalysis Basic - ( 31 Aug 2023 05:16 )    Color: x / Appearance: x / SG: x / pH: x  Gluc: 108 mg/dL / Ketone: x  / Bili: x / Urobili: x   Blood: x / Protein: x / Nitrite: x   Leuk Esterase: x / RBC: x / WBC x   Sq Epi: x / Non Sq Epi: x / Bacteria: x    08-30-23 @ 07:01  -  08-31-23 @ 07:00  --------------------------------------------------------  IN: 1540 mL / OUT: 900 mL / NET: 640 mL    08-31-23 @ 07:01  -  08-31-23 @ 09:21  --------------------------------------------------------  IN: 0 mL / OUT: 900 mL / NET: -900 mL    08-30-23 @ 07:01  -  08-31-23 @ 07:00  --------------------------------------------------------  IN: 1540 mL / OUT: 900 mL / NET: 640 mL    08-31-23 @ 07:01  -  08-31-23 @ 09:21  --------------------------------------------------------  IN: 0 mL / OUT: 900 mL / NET: -900 mL        RADIOLOGY:

## 2023-08-31 NOTE — CONSULT NOTE ADULT - SUBJECTIVE AND OBJECTIVE BOX
Patient is a 48y old  Female who presents with a chief complaint of Sinus pause (31 Aug 2023 12:03)      HPI:  47 yo female, former smoker, with PMH of HTN, HLD, COVID 1/2023, h/o LE DVT, A-Fib s/p ablation 6 yrs ago @  [facility], has been off AC but now back on Eliquis. She was sent to ED by Dr. Frazier for pacemaker placement. Pt has ILR in place which noted a 6 second pause and bradycardiac to 20s this morning. She has hx of venous ulcers on b/l LE and had debridement with Dr Doyle for right side, she has a new wound on left side and follows with Dr Doyle, as per patient he was planning for debridement for it. It's draining pus as per patient.      Pt denies fever, chills, nausea, vomiting, abdominal pain, diarrhea, headache, dizziness, weakness, chest pain, SOB, back pain, LOC, trauma, urinary symptoms, cough, calf pain.  She was admitted in 6/2023 for palpitations with associated OLIVA, found to be in aflutter 2:1, was given Cardizem and was planned for cardioversion, however, patient converted to NSR prior to the cardioversion.  Pt was also managed by Dr. Doyle for chronic left posterior calf wound, for which he has been on multiple antibiotics. An outpatient ECHO on 5/6/23 showed evidence of moderate pulmonary HTN with estimated pressure of 55mmHg, EF 55-60%, moderate TR, and mild MR.     Pt seen today with attending. We were called for evaluation of chronic LLE wound. pt has not noticed any significant changes. She typically follows up in Burn clinic for chronic wound. Just finished a course of abx. Wound care at home with silvadene, abd, kerlix. Currently s/p PPM done yesterday     PAST MEDICAL & SURGICAL HISTORY:  HTN (hypertension)  Afib  Back pain  H/O Raynaud's syndrome  DVT, lower extremity  KANDICE on CPAP  History of laparoscopic cholecystectomy  H/O prior ablation treatment      Allergies  No Known Allergies  Intolerances    PHYSICAL EXAM    ICU Vital Signs Last 24 Hrs  T(C): 36.9 (31 Aug 2023 07:18), Max: 37.2 (31 Aug 2023 00:19)  T(F): 98.5 (31 Aug 2023 07:18), Max: 98.9 (31 Aug 2023 00:19)  HR: 94 (31 Aug 2023 11:13) (94 - 128)  BP: 118/65 (31 Aug 2023 11:13) (90/56 - 119/68)  BP(mean): 84 (31 Aug 2023 11:13) (66 - 90)  RR: 20 (31 Aug 2023 11:13) (17 - 20)  SpO2: 95% (31 Aug 2023 11:13) (95% - 98%)    O2 Parameters below as of 31 Aug 2023 11:13  Patient On (Oxygen Delivery Method): room air    PHYSICAL EXAM:  GENERAL: NAD, well-developed  HEAD:  Atraumatic, Normocephalic  EYES: EOMI, PERRLA, conjunctiva and sclera clear  CHEST/LUNG: No cardiopulmonary compromise  EXTREMITIES:  2+ Peripheral Pulses, No clubbing, cyanosis, or edema  Neuro: AAOx3, speaking clear and fluent sentences  SKIN: LLE wound to posterior calf ~8x16cm with yellow exudate, no surrounding

## 2023-08-31 NOTE — PROGRESS NOTE ADULT - SUBJECTIVE AND OBJECTIVE BOX
Chief complaint: Patient is a 48y old  Female who presents with a chief complaint of Sinus pause (31 Aug 2023 09:20)    Interval history:  Seen and examined patient at bedside this AM.   Pt c/o mild pain at PPM site, Tylenol offered.   Denies CP, SOB, and dizziness.    Overnight: PAF on tele with HR up to 150s. Was given Toprol XL 50 mg x2 and IV Metoprolol tartrate 5 mg x2, HR improved to 110s.     Review of systems: A complete 10-point review of systems was obtained and is negative except as stated in the interval history.    Vitals:  T(F): 98.5, Max: 98.9 (08-31 @ 00:19)  HR: 94 (94 - 128)  BP: 118/65 (90/56 - 151/92)  RR: 20 (16 - 20)  SpO2: 95% (95% - 98%)    Ins & outs:     08-30 @ 07:01  -  08-31 @ 07:00  --------------------------------------------------------  IN: 1540 mL / OUT: 900 mL / NET: 640 mL    08-31 @ 07:01  -  08-31 @ 12:04  --------------------------------------------------------  IN: 220 mL / OUT: 900 mL / NET: -680 mL      Weight trend:  Weight (kg): 152.4 (08-30)    Physical exam:  General: No apparent distress  HEENT: Anicteric sclera. Moist mucous membranes. JVD -   Cardiac: +irregular rate and rhythm. No murmurs, rubs, or gallops.   Vascular: Symmetric radial pulses. +1 b/l dorsalis pedis pulses palpable.   Respiratory: Normal effort. Clear BS upon ascultation.   Abdomen: Soft, nontender. Audible bowel sounds.   Extremities: Warm with +2 b/l LE edema. No cyanosis or clubbing.   Skin: Warm and dry. No rash.   Neurologic: Grossly normal motor function.   Psychiatric: Oriented to person, place, and time.     Data reviewed:  - Telemetry: AF <-> SR , HR up to 150s    - ECG   < from: 12 Lead ECG (08.28.23 @ 22:23) >    Ventricular Rate 113 BPM    Atrial Rate 113 BPM    P-R Interval 186 ms    QRS Duration 106 ms    Q-T Interval 340 ms    QTC Calculation(Bazett) 466 ms    P Axis 9 degrees    R Axis 64 degrees    T Axis 26 degrees    Diagnosis Line Sinus tachycardia  Incomplete right bundle branch block  Anterior infarct , age undetermined  Abnormal ECG    - TTE (5/6/23): EF 55-60%, mod TR, mild MR, and moderate pulm HTN    - Chest x-ray   < from: Xray Chest 2 Views PA/Lat (08.31.23 @ 08:24) >  Findings:    Support devices:  Left-sided pacemaker    Cardiac/mediastinum/hilum: Stable cardiomegaly    Lung parenchyma/ Pleura: No focal parenchymal opacities, effusion or   pneumothorax is present.      Skeleton/soft tissues: No focal skeletal lesions are identified.    Impression:    Left-sided pacemaker, stable position.    Stable cardiomegaly.    No consolidation, effusion or pneumothorax.    - Cardiac catheterization 7/11/23:  mild CAD, mod pulm HTN     No recent stress test, CCTA, and cMRI performed:     - Labs:                        10.6   8.62  )-----------( 194      ( 31 Aug 2023 05:16 )             34.7     08-31    140  |  105  |  13  ----------------------------<  108<H>  4.2   |  25  |  0.6<L>    Ca    9.0      31 Aug 2023 05:16  Mg     2.0     08-31    TPro  6.3  /  Alb  3.5  /  TBili  0.9  /  DBili  x   /  AST  27  /  ALT  20  /  AlkPhos  95  08-31      Troponin T, Serum: <0.01 ng/mL (08-28-23 @ 20:53)    Urinalysis Basic - ( 31 Aug 2023 05:16 )    Color: x / Appearance: x / SG: x / pH: x  Gluc: 108 mg/dL / Ketone: x  / Bili: x / Urobili: x   Blood: x / Protein: x / Nitrite: x   Leuk Esterase: x / RBC: x / WBC x   Sq Epi: x / Non Sq Epi: x / Bacteria: x      Medications:  acetaminophen     Tablet .. 650 milliGRAM(s) Oral every 6 hours  atorvastatin 80 milliGRAM(s) Oral at bedtime  buMETAnide 2 milliGRAM(s) Oral daily  chlorhexidine 2% Cloths 1 Application(s) Topical daily  diltiazem    milliGRAM(s) Oral daily  doxycycline monohydrate Capsule 100 milliGRAM(s) Oral every 12 hours  lidocaine   4% Patch 1 Patch Transdermal every 24 hours  metoprolol succinate  milliGRAM(s) Oral daily  pantoprazole    Tablet 40 milliGRAM(s) Oral before breakfast  sildenafil (REVATIO) 20 milliGRAM(s) Oral three times a day  silver sulfADIAZINE 1% Cream 1 Application(s) Topical daily    Drips:    PRN:     Allergies    No Known Allergies    Intolerances

## 2023-08-31 NOTE — PROGRESS NOTE ADULT - ASSESSMENT
Assessment:  47 yo female, former smoker, with PMH of HTN, HLD, COVID 1/2023, h/o LE DVT, Paroxsysmal A-Fib s/p ablation 6 yrs ago @  [facility], has been off AC but now back on Eliquis. She was sent to ED by Dr. Frazier for pacemaker placement. Pt had MCOT monitor in place which noted a 6 second pause and bradycardiac to 20s this morning. She also has hx of symptomatic afib/aflutter in RVR, c/o palpitations. On 8/30 s/p Medtronic DC PPM, she has hx of venous ulcers on b/l LE and had debridement with Dr Doyle for right side, she has a nonhealing LLE wound for >1 year with plan for debridement as outpatient. Awaiting burn consult.     Problems and associated plan of cares discussed:   # Sinus pause of 6 seconds, bradycardia to 20s  #A-flutter w/ RVR  #s/p ablation hx chronic A-Fib on Eliquis  - pacemaker placement today  - f/u preop labs  - hold eliquis for now; can resume after procedure  - will hold metoprolol and sildenafil for now, resume as per Cardiology  - will hold of Bumex for the procedure to avoid hypotension, resume post op  - trops negative  - mildly elevated BNP 1458    #Left leg wound with drainage  -f/u Dr. Doyle  -burn consult for eval  - plan for debridement    # pulmonary HTN  - Moderate Pulmo HTN  - c/w sildenafil for Raynaud's and Pul HTN    #Raynaud's  -on sildenafil,  - can hold for now, resume after the procedure    #HTN, uncontrolled  -on metoprolol succinate 100 mg, and Bumex 2 mg QD  -can resume after the procedure    #KANDICE  -continue CPAP     #Supportive Management:  Dispo:   DVT Ppx: GI Ppx: pantoprazole    Tablet 40 milliGRAM(s) Oral before breakfast  Diet:        Please contact me with any questions or concerns at [phone].

## 2023-08-31 NOTE — CONSULT NOTE ADULT - ASSESSMENT
Patient is a 48y old  Female who presents with a chief complaint of Sinus pause (31 Aug 2023 12:03)    - ok for d/c, can follow up in the burn clinic [phone]  - continue local wound care twice daily - Vashe wash, santyl, vashe moist gauze, abd and kerlix/ace.   - pain control

## 2023-08-31 NOTE — DISCHARGE NOTE PROVIDER - NSDCCPCAREPLAN_GEN_ALL_CORE_FT
PRINCIPAL DISCHARGE DIAGNOSIS  Diagnosis: Sinus pause  Assessment and Plan of Treatment:       SECONDARY DISCHARGE DIAGNOSES  Diagnosis: Paroxysmal atrial fibrillation  Assessment and Plan of Treatment: - continue your medications are prescribed.    Diagnosis: Lower extremity ulceration  Assessment and Plan of Treatment: - Please take your doxycycline 100 mg twice a day for 8 days.   - Please continue wound dressings every day as prescribed.

## 2023-08-31 NOTE — DISCHARGE NOTE PROVIDER - NSDCMRMEDTOKEN_GEN_ALL_CORE_FT
apixaban 5 mg oral tablet: 1 tab(s) orally every 12 hours  Bumex 2 mg oral tablet: 1 orally once a day  dilTIAZem 240 mg/24 hours oral capsule, extended release: 1 cap(s) orally once a day  Lipitor 80 mg oral tablet: 1 orally once a day (at bedtime)  metoprolol succinate 100 mg oral tablet, extended release: 1 orally once a day  sildenafil 20 mg oral tablet: 1 orally 3 times a day   apixaban 5 mg oral tablet: 1 tab(s) orally every 12 hours RESUME ON SATURDAY 9/2/23 IN AM  Bumex 2 mg oral tablet: 1 orally once a day  dilTIAZem 240 mg/24 hours oral capsule, extended release: 1 cap(s) orally once a day  doxycycline monohydrate 50 mg oral capsule: 2 cap(s) orally every 12 hours  Lipitor 80 mg oral tablet: 1 orally once a day (at bedtime)  metoprolol succinate 100 mg oral tablet, extended release: 1 orally once a day  sildenafil 20 mg oral tablet: 1 orally 3 times a day

## 2023-08-31 NOTE — DISCHARGE NOTE NURSING/CASE MANAGEMENT/SOCIAL WORK - PATIENT PORTAL LINK FT
You can access the FollowMyHealth Patient Portal offered by [facility] by registering at the following website: [email or web address]. By joining [facility]’s FollowMyHealth portal, you will also be able to view your health information using other applications (apps) compatible with our system.

## 2023-08-31 NOTE — DISCHARGE NOTE PROVIDER - PROVIDER TOKENS
PROVIDER:[TOKEN:[[id number]],FOLLOWUP:[2 weeks]],PROVIDER:[TOKEN:[[id number]],SCHEDULEDAPPT:[09/18/2023],SCHEDULEDAPPTTIME:[01:00 PM]]

## 2023-08-31 NOTE — DISCHARGE NOTE PROVIDER - NSFOLLOWUPCLINICS_GEN_ALL_ED_FT
[facility]  Burn  [address], [address]  [place] [address]  Phone: [phone]  Fax:   Follow Up Time: 1 week

## 2023-08-31 NOTE — DISCHARGE NOTE PROVIDER - NSDCFUSCHEDAPPT_GEN_ALL_CORE_FT
[facility]  [facility] [address]  Scheduled Appointment: 09/18/2023

## 2023-08-31 NOTE — DISCHARGE NOTE PROVIDER - CARE PROVIDERS DIRECT ADDRESSES
,DirectAddress_Unknown,[email or web address]

## 2023-09-14 ENCOUNTER — APPOINTMENT (OUTPATIENT)
Dept: BURN CARE | Facility: CLINIC | Age: 48
End: 2023-09-14
Payer: COMMERCIAL

## 2023-09-14 ENCOUNTER — OUTPATIENT (OUTPATIENT)
Dept: OUTPATIENT SERVICES | Facility: HOSPITAL | Age: 48
LOS: 1 days | End: 2023-09-14
Payer: COMMERCIAL

## 2023-09-14 VITALS — SYSTOLIC BLOOD PRESSURE: 129 MMHG | DIASTOLIC BLOOD PRESSURE: 90 MMHG | HEART RATE: 87 BPM | TEMPERATURE: 97.8 F

## 2023-09-14 DIAGNOSIS — Z90.49 ACQUIRED ABSENCE OF OTHER SPECIFIED PARTS OF DIGESTIVE TRACT: Chronic | ICD-10-CM

## 2023-09-14 DIAGNOSIS — Z98.890 OTHER SPECIFIED POSTPROCEDURAL STATES: Chronic | ICD-10-CM

## 2023-09-14 DIAGNOSIS — Z00.8 ENCOUNTER FOR OTHER GENERAL EXAMINATION: ICD-10-CM

## 2023-09-14 PROCEDURE — 87070 CULTURE OTHR SPECIMN AEROBIC: CPT

## 2023-09-14 PROCEDURE — 99213 OFFICE O/P EST LOW 20 MIN: CPT

## 2023-09-14 PROCEDURE — 87186 SC STD MICRODIL/AGAR DIL: CPT

## 2023-09-15 DIAGNOSIS — L97.929 NON-PRESSURE CHRONIC ULCER OF UNSPECIFIED PART OF LEFT LOWER LEG WITH UNSPECIFIED SEVERITY: ICD-10-CM

## 2023-09-15 DIAGNOSIS — I83.029 VARICOSE VEINS OF LEFT LOWER EXTREMITY WITH ULCER OF UNSPECIFIED SITE: ICD-10-CM

## 2023-09-16 LAB — BACTERIA SPEC CULT: ABNORMAL

## 2023-09-18 ENCOUNTER — APPOINTMENT (OUTPATIENT)
Dept: ELECTROPHYSIOLOGY | Facility: CLINIC | Age: 48
End: 2023-09-18
Payer: COMMERCIAL

## 2023-09-18 VITALS
HEART RATE: 67 BPM | HEIGHT: 66 IN | DIASTOLIC BLOOD PRESSURE: 71 MMHG | SYSTOLIC BLOOD PRESSURE: 127 MMHG | WEIGHT: 293 LBS | BODY MASS INDEX: 47.09 KG/M2

## 2023-09-18 DIAGNOSIS — Z51.89 ENCOUNTER FOR OTHER SPECIFIED AFTERCARE: ICD-10-CM

## 2023-09-18 PROCEDURE — 93280 PM DEVICE PROGR EVAL DUAL: CPT

## 2023-09-18 PROCEDURE — 99214 OFFICE O/P EST MOD 30 MIN: CPT

## 2023-09-18 RX ORDER — OXYCODONE AND ACETAMINOPHEN 5; 325 MG/1; MG/1
5-325 TABLET ORAL
Qty: 30 | Refills: 0 | Status: COMPLETED | COMMUNITY
Start: 2023-09-14 | End: 2023-09-18

## 2023-09-25 PROBLEM — Z51.89 VISIT FOR WOUND CHECK: Status: ACTIVE | Noted: 2023-09-25

## 2023-10-05 ENCOUNTER — APPOINTMENT (OUTPATIENT)
Dept: BURN CARE | Facility: CLINIC | Age: 48
End: 2023-10-05
Payer: COMMERCIAL

## 2023-10-05 ENCOUNTER — OUTPATIENT (OUTPATIENT)
Dept: OUTPATIENT SERVICES | Facility: HOSPITAL | Age: 48
LOS: 1 days | End: 2023-10-05
Payer: COMMERCIAL

## 2023-10-05 VITALS — HEART RATE: 70 BPM | SYSTOLIC BLOOD PRESSURE: 130 MMHG | TEMPERATURE: 98.1 F | DIASTOLIC BLOOD PRESSURE: 80 MMHG

## 2023-10-05 DIAGNOSIS — Z98.890 OTHER SPECIFIED POSTPROCEDURAL STATES: Chronic | ICD-10-CM

## 2023-10-05 DIAGNOSIS — Z90.49 ACQUIRED ABSENCE OF OTHER SPECIFIED PARTS OF DIGESTIVE TRACT: Chronic | ICD-10-CM

## 2023-10-05 DIAGNOSIS — Z00.8 ENCOUNTER FOR OTHER GENERAL EXAMINATION: ICD-10-CM

## 2023-10-05 PROCEDURE — 99213 OFFICE O/P EST LOW 20 MIN: CPT

## 2023-10-05 PROCEDURE — 87186 SC STD MICRODIL/AGAR DIL: CPT

## 2023-10-05 PROCEDURE — 87070 CULTURE OTHR SPECIMN AEROBIC: CPT

## 2023-10-05 PROCEDURE — 87077 CULTURE AEROBIC IDENTIFY: CPT

## 2023-10-06 DIAGNOSIS — I83.029 VARICOSE VEINS OF LEFT LOWER EXTREMITY WITH ULCER OF UNSPECIFIED SITE: ICD-10-CM

## 2023-10-06 DIAGNOSIS — L97.929 NON-PRESSURE CHRONIC ULCER OF UNSPECIFIED PART OF LEFT LOWER LEG WITH UNSPECIFIED SEVERITY: ICD-10-CM

## 2023-10-11 LAB — BACTERIA SPEC CULT: ABNORMAL

## 2023-10-26 ENCOUNTER — APPOINTMENT (OUTPATIENT)
Dept: BURN CARE | Facility: CLINIC | Age: 48
End: 2023-10-26
Payer: COMMERCIAL

## 2023-10-26 ENCOUNTER — OUTPATIENT (OUTPATIENT)
Dept: OUTPATIENT SERVICES | Facility: HOSPITAL | Age: 48
LOS: 1 days | End: 2023-10-26
Payer: COMMERCIAL

## 2023-10-26 VITALS — HEART RATE: 80 BPM | TEMPERATURE: 97.6 F | DIASTOLIC BLOOD PRESSURE: 85 MMHG | SYSTOLIC BLOOD PRESSURE: 125 MMHG

## 2023-10-26 DIAGNOSIS — Z90.49 ACQUIRED ABSENCE OF OTHER SPECIFIED PARTS OF DIGESTIVE TRACT: Chronic | ICD-10-CM

## 2023-10-26 DIAGNOSIS — Z00.8 ENCOUNTER FOR OTHER GENERAL EXAMINATION: ICD-10-CM

## 2023-10-26 DIAGNOSIS — Z98.890 OTHER SPECIFIED POSTPROCEDURAL STATES: Chronic | ICD-10-CM

## 2023-10-26 PROCEDURE — 99213 OFFICE O/P EST LOW 20 MIN: CPT

## 2023-10-26 PROCEDURE — 87186 SC STD MICRODIL/AGAR DIL: CPT

## 2023-10-26 PROCEDURE — 87070 CULTURE OTHR SPECIMN AEROBIC: CPT

## 2023-10-26 PROCEDURE — 87077 CULTURE AEROBIC IDENTIFY: CPT

## 2023-10-27 DIAGNOSIS — I83.029 VARICOSE VEINS OF LEFT LOWER EXTREMITY WITH ULCER OF UNSPECIFIED SITE: ICD-10-CM

## 2023-10-27 DIAGNOSIS — L97.929 NON-PRESSURE CHRONIC ULCER OF UNSPECIFIED PART OF LEFT LOWER LEG WITH UNSPECIFIED SEVERITY: ICD-10-CM

## 2023-10-28 LAB — BACTERIA SPEC CULT: ABNORMAL

## 2023-10-31 NOTE — ED ADULT NURSE NOTE - PRO INTERPRETER NEED 2
All follow up care discussed. Pt and family verbalized understandings. No other concerns voiced. PIV removed and bleeding controlled.  Stable and ambulatory upon dismissal.
English

## 2023-12-07 ENCOUNTER — OUTPATIENT (OUTPATIENT)
Dept: OUTPATIENT SERVICES | Facility: HOSPITAL | Age: 48
LOS: 1 days | End: 2023-12-07
Payer: COMMERCIAL

## 2023-12-07 ENCOUNTER — APPOINTMENT (OUTPATIENT)
Dept: BURN CARE | Facility: CLINIC | Age: 48
End: 2023-12-07
Payer: COMMERCIAL

## 2023-12-07 VITALS — DIASTOLIC BLOOD PRESSURE: 83 MMHG | HEART RATE: 82 BPM | SYSTOLIC BLOOD PRESSURE: 135 MMHG | TEMPERATURE: 97.6 F

## 2023-12-07 DIAGNOSIS — Z00.8 ENCOUNTER FOR OTHER GENERAL EXAMINATION: ICD-10-CM

## 2023-12-07 DIAGNOSIS — Z90.49 ACQUIRED ABSENCE OF OTHER SPECIFIED PARTS OF DIGESTIVE TRACT: Chronic | ICD-10-CM

## 2023-12-07 DIAGNOSIS — Z98.890 OTHER SPECIFIED POSTPROCEDURAL STATES: Chronic | ICD-10-CM

## 2023-12-07 PROCEDURE — 87077 CULTURE AEROBIC IDENTIFY: CPT

## 2023-12-07 PROCEDURE — 99213 OFFICE O/P EST LOW 20 MIN: CPT

## 2023-12-07 PROCEDURE — 87070 CULTURE OTHR SPECIMN AEROBIC: CPT

## 2023-12-07 PROCEDURE — 87186 SC STD MICRODIL/AGAR DIL: CPT

## 2023-12-10 LAB — BACTERIA SPEC CULT: ABNORMAL

## 2023-12-12 ENCOUNTER — APPOINTMENT (OUTPATIENT)
Dept: RADIOLOGY | Facility: CLINIC | Age: 48
End: 2023-12-12
Payer: COMMERCIAL

## 2023-12-12 ENCOUNTER — APPOINTMENT (OUTPATIENT)
Dept: PAIN MANAGEMENT | Facility: CLINIC | Age: 48
End: 2023-12-12
Payer: COMMERCIAL

## 2023-12-12 VITALS — HEIGHT: 66 IN | WEIGHT: 293 LBS | BODY MASS INDEX: 47.09 KG/M2

## 2023-12-12 DIAGNOSIS — G89.29 PAIN IN RIGHT KNEE: ICD-10-CM

## 2023-12-12 DIAGNOSIS — M25.562 PAIN IN RIGHT KNEE: ICD-10-CM

## 2023-12-12 DIAGNOSIS — M25.561 PAIN IN RIGHT KNEE: ICD-10-CM

## 2023-12-12 PROCEDURE — 99203 OFFICE O/P NEW LOW 30 MIN: CPT

## 2023-12-12 PROCEDURE — 73562 X-RAY EXAM OF KNEE 3: CPT | Mod: 50

## 2023-12-13 NOTE — PHYSICAL EXAM
[Healing] : healing [Size%: ______] : Size: [unfilled]% [4] : 4 out of 10 [Abnormal] : abnormal [Large] : medium [] : no [de-identified] : cont current pain meds and consider pain management [de-identified] : The venous stasis ulcers  left leg  measures  [id number].  A wound culture was obtained. The left leg has  adherent devitalized tissue.  The patient was instructed to clean  the wound with soap and water. Wash the wound with hibiclens soap .  Continue local wound care with santyl .  Follow up 2 - 4  weeks.  Will schedule debridement and BTM. [TWNoteComboBox1] : ABD pad

## 2023-12-13 NOTE — ASSESSMENT
[FreeTextEntry1] : The venous stasis ulcers  left leg  measures  [id number].  A wound culture was obtained. The left leg has  adherent devitalized tissue.  The patient was instructed to clean  the wound with soap and water. Wash the wound with hibiclens soap .  Continue local wound care with santyl .  Follow up 2 - 4  weeks.  Will schedule debridement and BTM. [Wound Care] : wound care
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
## 2023-12-13 NOTE — PHYSICAL EXAM
[Healing] : healing [Size%: ______] : Size: [unfilled]% [4] : 4 out of 10 [Abnormal] : abnormal [Large] : medium [] : no [de-identified] : cont current pain meds and consider pain management [de-identified] : The venous stasis ulcers  left leg  measures  [id number].  A wound culture was obtained. The left leg has  adherent devitalized tissue.  The patient was instructed to clean  the wound with soap and water. Wash the wound with hibiclens soap .  Continue local wound care with santyl .  Follow up 2 - 4  weeks.  Will schedule debridement and BTM. [TWNoteComboBox1] : ABD pad

## 2023-12-13 NOTE — REASON FOR VISIT
[Revisit] : revisit [Were you seen in the Emergency Room?] : seen in the emergency room [Were you admitted to the burn center at [facility]?] : not admitted to the burn center at [facility] [Family Member] : family member
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
## 2023-12-13 NOTE — ASSESSMENT
[FreeTextEntry1] : The venous stasis ulcers  left leg  measures  [id number].  A wound culture was obtained. The left leg has  adherent devitalized tissue.  The patient was instructed to clean  the wound with soap and water. Wash the wound with hibiclens soap .  Continue local wound care with santyl .  Follow up 2 - 4  weeks.  Will schedule debridement and BTM. [Wound Care] : wound care
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
## 2023-12-14 DIAGNOSIS — L97.929 NON-PRESSURE CHRONIC ULCER OF UNSPECIFIED PART OF LEFT LOWER LEG WITH UNSPECIFIED SEVERITY: ICD-10-CM

## 2023-12-14 DIAGNOSIS — I83.029 VARICOSE VEINS OF LEFT LOWER EXTREMITY WITH ULCER OF UNSPECIFIED SITE: ICD-10-CM

## 2023-12-15 PROBLEM — M25.561 CHRONIC PAIN OF BOTH KNEES: Status: ACTIVE | Noted: 2023-12-12

## 2023-12-15 NOTE — HISTORY OF PRESENT ILLNESS
[FreeTextEntry1] : HPI: Ms. Snow is a 48 year old female with pain complaints in her bilateral knees. The pain has been going on for a while and recently worsened. The pain is located in the entire knee, especially in the medial aspect of the knee and made worse with standing/walking, relieved with sitting / laying down. Pain is aching/throbbing with sharp stabbing pains with increased activity. The patient knee are severely swollen. This patient ambulates with cane.  To recap the patient received gel injection in knees with no pain relief. The patient is on Eliquis and cannot tolerate NSAIDs.  The patient utilizes oxycodone 5 mg for breakthrough pain and states she will take 2 to 3 per day.  The pain limits the patient's ability to perform typical activities of daily living and lowers overall quality of life. Pain does not radiate anywhere and is 8/10 on the pain scale today.

## 2023-12-15 NOTE — DISCUSSION/SUMMARY
[de-identified] : A discussion regarding available pain management treatment options occurred with the patient.  These included interventional, rehabilitative, pharmacological, and alternative modalities. We will proceed with the followin. Ordered bilateral knee X ray   f/u in 2 weeks  I Jaqueline Cha attest that this documentation has been prepared under the direction and in the presence of provider Dr. Jorge L Felix.   The documentation recorded by the scribe in my presence, accurately reflects the service I personally performed, and the decisions made by me with my edits as appropriate.       Jorge L Felix, DO

## 2023-12-15 NOTE — PHYSICAL EXAM
[de-identified] : L knee  Inspection/palpation  Negative swelling, erythema, warmth  + tenderness to palpation at medial, lateral joint line and at the patella  negative crepitus    ROM:  Flexion 110 (normal 120-150)  Extension 0 (normal 0-15)    Tests: Negative anterior drawer test  Negative Clarence's test  There is no varus or valgus instability  Quad strength is 5/5, hamstring strength is 5/5    R Knee  Inspection/palpation  Negative swelling, erythema, warmth  + tenderness to palpation at the medial & lateral joint line and patella   Negative crepitus    ROM:  Flexion 110 (normal 120-150)  Extension 0 (normal 0-15)    Tests: Negative anterior drawer test  Negative Clarence's test  There is no varus or valgus instability  Quad strength is 5/5, hamstring strength is 5/5

## 2023-12-18 ENCOUNTER — LABORATORY RESULT (OUTPATIENT)
Age: 48
End: 2023-12-18

## 2023-12-18 ENCOUNTER — APPOINTMENT (OUTPATIENT)
Dept: ELECTROPHYSIOLOGY | Facility: CLINIC | Age: 48
End: 2023-12-18
Payer: COMMERCIAL

## 2023-12-18 VITALS
BODY MASS INDEX: 47.09 KG/M2 | SYSTOLIC BLOOD PRESSURE: 112 MMHG | HEART RATE: 103 BPM | HEIGHT: 66 IN | WEIGHT: 293 LBS | DIASTOLIC BLOOD PRESSURE: 73 MMHG

## 2023-12-18 VITALS — BODY MASS INDEX: 47.09 KG/M2 | WEIGHT: 293 LBS | HEIGHT: 66 IN

## 2023-12-18 DIAGNOSIS — I47.20 VENTRICULAR TACHYCARDIA, UNSPECIFIED: ICD-10-CM

## 2023-12-18 DIAGNOSIS — Z82.49 FAMILY HISTORY OF ISCHEMIC HEART DISEASE AND OTHER DISEASES OF THE CIRCULATORY SYSTEM: ICD-10-CM

## 2023-12-18 DIAGNOSIS — I73.00 RAYNAUD'S SYNDROME W/OUT GANGRENE: ICD-10-CM

## 2023-12-18 DIAGNOSIS — G47.33 OBSTRUCTIVE SLEEP APNEA (ADULT) (PEDIATRIC): ICD-10-CM

## 2023-12-18 DIAGNOSIS — E66.01 MORBID (SEVERE) OBESITY DUE TO EXCESS CALORIES: ICD-10-CM

## 2023-12-18 PROCEDURE — 93000 ELECTROCARDIOGRAM COMPLETE: CPT | Mod: 59

## 2023-12-18 PROCEDURE — 93280 PM DEVICE PROGR EVAL DUAL: CPT

## 2023-12-18 PROCEDURE — 99215 OFFICE O/P EST HI 40 MIN: CPT

## 2023-12-18 RX ORDER — OXYCODONE AND ACETAMINOPHEN 5; 325 MG/1; MG/1
5-325 TABLET ORAL EVERY 6 HOURS
Qty: 30 | Refills: 0 | Status: COMPLETED | COMMUNITY
Start: 2023-11-21 | End: 2023-12-18

## 2023-12-18 RX ORDER — COLLAGENASE SANTYL 250 [ARB'U]/G
250 OINTMENT TOPICAL DAILY
Qty: 4 | Refills: 1 | Status: COMPLETED | COMMUNITY
Start: 2023-09-28 | End: 2023-12-18

## 2023-12-18 RX ORDER — CIPROFLOXACIN HYDROCHLORIDE 500 MG/1
500 TABLET, FILM COATED ORAL TWICE DAILY
Qty: 20 | Refills: 0 | Status: COMPLETED | COMMUNITY
Start: 2023-09-19 | End: 2023-12-18

## 2023-12-18 RX ORDER — DILTIAZEM HYDROCHLORIDE 240 MG/1
240 TABLET, EXTENDED RELEASE ORAL DAILY
Refills: 0 | Status: COMPLETED | COMMUNITY
End: 2023-12-18

## 2023-12-18 RX ORDER — OXYCODONE AND ACETAMINOPHEN 5; 325 MG/1; MG/1
5-325 TABLET ORAL
Qty: 30 | Refills: 0 | Status: COMPLETED | COMMUNITY
Start: 2023-12-07 | End: 2023-12-18

## 2023-12-18 RX ORDER — AMOXICILLIN AND CLAVULANATE POTASSIUM 875; 125 MG/1; MG/1
875-125 TABLET, COATED ORAL TWICE DAILY
Qty: 14 | Refills: 0 | Status: COMPLETED | COMMUNITY
Start: 2023-10-12 | End: 2023-12-18

## 2023-12-18 RX ORDER — OXYCODONE AND ACETAMINOPHEN 5; 325 MG/1; MG/1
5-325 TABLET ORAL
Qty: 30 | Refills: 0 | Status: COMPLETED | COMMUNITY
Start: 2023-08-17 | End: 2023-12-18

## 2023-12-18 RX ORDER — AMOXICILLIN AND CLAVULANATE POTASSIUM 875; 125 MG/1; MG/1
875-125 TABLET, COATED ORAL
Qty: 14 | Refills: 0 | Status: COMPLETED | COMMUNITY
Start: 2023-11-01 | End: 2023-12-18

## 2023-12-18 RX ORDER — OXYCODONE AND ACETAMINOPHEN 5; 325 MG/1; MG/1
5-325 TABLET ORAL EVERY 6 HOURS
Qty: 30 | Refills: 0 | Status: COMPLETED | COMMUNITY
Start: 2023-10-05 | End: 2023-12-18

## 2023-12-18 NOTE — CARDIOLOGY SUMMARY
[de-identified] : 12/18/2023: Sinus tachycardia at 103 bpm with occasional PVCs, IRBBB 8/282023: sinus tachycardia at 109 bpm; 8/21/2023 tach ( bpm) (AFl with variable block?) [de-identified] : TTE 05/06/2023: LVEF 55-60%, ascending aorta 3.7 cm across, moderate TR/ pulmonary HTN. [de-identified] : Cath 7/11/2023 Mild CAD. Mod pulm HTN. Likely combination of HFpEF, obesity hypoventilation syndrome and Raynaud.

## 2023-12-18 NOTE — PROCEDURE
[No] : not [NSR] : normal sinus rhythm [See Device Printout] : See device printout [Pacemaker] : pacemaker [DDD] : DDD [Longevity: ___ months] : The estimated remaining battery life is [unfilled] months [Normal] : The battery status is normal. [Threshold Testing Performed] : Threshold testing was performed [Lead Imp:  ___ohms] : lead impedance was [unfilled] ohms [Sensing Amplitude ___mv] : sensing amplitude was [unfilled] mv [___V @] : [unfilled] V [___ ms] : [unfilled] ms [Counters Reset] : the counters were reset [Programmed for Longevity] : output reprogrammed for improved battery longevity [de-identified] : 73 bpm [de-identified] : [employer] [de-identified] : Sara MARRERO DR MRI [id number] [de-identified] : [id number] [de-identified] : 08/30/2023 [de-identified] :  [de-identified] : Turned on Alerts.  [de-identified] : Multiple NSVT episodes see above. Transmitting on CADsurf.

## 2023-12-18 NOTE — DISCUSSION/SUMMARY
[FreeTextEntry1] : Ms. Kayla Snow is a pleasant 48-year-old woman with morbid obesity, moderate pulmonary hypertension, KANDICE on CPAP, AF s/p ablation 6 yrs ago at [facility] (stopped taking Xarelto), LE DVT, KANDICE c/w CPAP, Raynaud's syndrome, former smoker, paroxysmal atrial fibrillation and atrial aflutter 2:1, Tachy-adriana syndrome. pause 6.2 seconds, s/p Left bundle area pacing on 8/30/2023, multiple episodes of symptomatic ventricular tachycardia.   NSVT: 9/2/2023; VT 2.5 sec at 214 bpm 11/24/2023 9 seconds VT at 200 bpm; at 8 pm associated with presyncope and moderate to severe dizziness. 12/17/2023; 11 seconds VT at 360 ms, 11 pm 12/18/2023: 11 seconds VT at 200 bpm  #Patient has multiple NSVT lasting 9-11 seconds associated with symptoms.  -Etiology of NSVT is unclear. No significant CAD and normal LV systolic function -I recommend increasing Metoprolol from 150 mg to 200 mg per day. -Unable to complete CMR due to claustrophobia, obesity and presence of PPM. -For the treatment of symptomatic Ventricular Tachycardia, I recommend upgrade to dual chamber ICD and extraction of pacemaker lead. An EP study for risk stratification is contraindicated due to pulmonary HTN. -I will plan PET scan after device upgrade. -I recommend holding eliquis for 48 hours prior to device upgrade scheduled with Dr. Monique.  # Paroxysmal AFib/AFlutter - 2 week event monitor to assess AF burden since pt is having daily palpitations and to assess if pt has AFib as well as AFlutter - Cont Eliquis 5mg PO BID. Denies s/sx of bleeding.  - event on 8/28/2023: Tachy-adriana syndrome. pause 6.2 seconds. at 7h20 am. was sleeping and using CPAP.  # KANDICE  - Compliant with CPAP  I interrogated and reprogrammed her device as described in procedure. Her wound is healed properly, with no signs of inflammation, infection or bleeding. I discussed with patient plan of care in great details. I discussed remote monitoring with her, and need to call office if she does manual transmission I answered all her questions to her satisfaction. Patient was pleased with the visit.    A thorough discussion was held with the patient concerning all aspects of ICD therapy. We reviewed the data supporting ICD therapy and how it applies individually. We discussed the risks, nature of procedure, and follow up care after device is implanted, including outcomes of ICD implantation and living with an ICD. We discussed management of ICD therapy throughout life, including deactivation of the ICD. Patient is in agreement with proceeding with the device implant. We discussed the risks of bleeding, hematoma, injury to vessels and heart, perforation, tamponade, pneumothorax, infection, lead dislodgment, device malfunction, and rare risks of stroke/heart attack/death. Patient expressed understanding of the discussion. After all questions were answered, it was a shared decision to proceed with ICD therapy. My  will contact patient with date and instruction prior to the procedure.  Upgrade to dual ICD and extraction of old RV pacing lead (12/26/2023, Patricia Geiger)  Patient will follow with me in 4-6 weeks' time or earlier if symptoms develop worsen. Please do not hesitate to contact me at [phone] if you have any further questions regarding this patient care.

## 2023-12-18 NOTE — REASON FOR VISIT
[Arrhythmia/ECG Abnorrmalities] : arrhythmia/ECG abnormalities [Other: ____] : [unfilled] [Follow-up Device Check] : is here today for a follow-up device check visit for [FreeTextEntry3] : Dr. Rashaun Hartley - Dr. Dominic Villalobos

## 2023-12-18 NOTE — HISTORY OF PRESENT ILLNESS
[FreeTextEntry1] : EP: Dr. Frazier Cardio: Dr. Hartley  AF s/p ablation 6 yrs ago at [facility] (stopped taking Xarelto), LE DVT, KANDICE c/w CPAP, Raynaud's former smoker, with recent hospitalization from 6/7-6/8 for palpitations and SOB, found to be in aflutter 2:1. Cardizem with minimal response. Set up for TOLU/CV but self-converted prior to procedure. Recently had an outpatient ECHO a month ago which showed evidence of moderate pulmonary hypertension.  Of note, pt also has a chronic left posterior calf wound for which she sees Dr. Doyle. She noticed increased drainage recently similar to prior infections and has been on multiple antibiotics for it.   8/21/2023: Presents as hospital follow up. Feels daily palpitations. She is interested in ablation. Has KANDICE and is c/w CPAP. Denies chest pain or syncope. Had recent cath with minimal CAD.  8/28/2023: 7h20 am 6.2 sec pause. intermittent complete AV block with HR < 20 bpm.  8/30/2023: dual PPM placement - Left bundle area pacing 9/2/2023; VT 2.5 sec at 214 bpm 11/24/2023 9 seconds VT at 200 bpm; at 8 pm associated with presyncope and moderate to severe dizziness. 12/17/2023; 11 seconds VT at 360 ms, 11 pm 12/18/2023: 11 seconds VT at 200 bpm  12/18/2023: The patient has been experiencing brief episodes of moderate to severe dizziness, palpitations, and presyncope. She denies chest pain/discomfort, dyspnea or any syncopal episodes. Patient is severely claustrophobic.

## 2023-12-18 NOTE — PHYSICAL EXAM
[Well Developed] : well developed [Well Nourished] : well nourished [No Acute Distress] : no acute distress [Obese] : obese [Normal Conjunctiva] : normal conjunctiva [Normal Venous Pressure] : normal venous pressure [Normal S1, S2] : normal S1, S2 [No Murmur] : no murmur [Clear Lung Fields] : clear lung fields [Good Air Entry] : good air entry [No Respiratory Distress] : no respiratory distress  [Soft] : abdomen soft [Normal Gait] : normal gait [No Edema] : no edema [No Rash] : no rash [Moves all extremities] : moves all extremities [Normal Speech] : normal speech [Alert and Oriented] : alert and oriented [Normal memory] : normal memory [Left Infraclavicular] : left infraclavicular area [Clean] : clean [Dry] : dry [Well-Healed] : well-healed [de-identified] : tachycardic

## 2023-12-26 ENCOUNTER — RESULT REVIEW (OUTPATIENT)
Age: 48
End: 2023-12-26

## 2023-12-26 ENCOUNTER — INPATIENT (INPATIENT)
Facility: HOSPITAL | Age: 48
LOS: 0 days | Discharge: HOME | DRG: 245 | End: 2023-12-27
Attending: THORACIC SURGERY (CARDIOTHORACIC VASCULAR SURGERY) | Admitting: THORACIC SURGERY (CARDIOTHORACIC VASCULAR SURGERY)
Payer: COMMERCIAL

## 2023-12-26 VITALS
OXYGEN SATURATION: 100 % | SYSTOLIC BLOOD PRESSURE: 133 MMHG | HEIGHT: 66 IN | DIASTOLIC BLOOD PRESSURE: 68 MMHG | TEMPERATURE: 98 F | RESPIRATION RATE: 17 BRPM | WEIGHT: 293 LBS | HEART RATE: 58 BPM

## 2023-12-26 DIAGNOSIS — Z98.890 OTHER SPECIFIED POSTPROCEDURAL STATES: Chronic | ICD-10-CM

## 2023-12-26 DIAGNOSIS — I49.5 SICK SINUS SYNDROME: ICD-10-CM

## 2023-12-26 DIAGNOSIS — Z90.49 ACQUIRED ABSENCE OF OTHER SPECIFIED PARTS OF DIGESTIVE TRACT: Chronic | ICD-10-CM

## 2023-12-26 LAB
ALBUMIN SERPL ELPH-MCNC: 3.7 G/DL — SIGNIFICANT CHANGE UP (ref 3.5–5.2)
ALBUMIN SERPL ELPH-MCNC: 3.7 G/DL — SIGNIFICANT CHANGE UP (ref 3.5–5.2)
ALP SERPL-CCNC: 130 U/L — HIGH (ref 30–115)
ALP SERPL-CCNC: 130 U/L — HIGH (ref 30–115)
ALT FLD-CCNC: 18 U/L — SIGNIFICANT CHANGE UP (ref 0–41)
ALT FLD-CCNC: 18 U/L — SIGNIFICANT CHANGE UP (ref 0–41)
ANION GAP SERPL CALC-SCNC: 9 MMOL/L — SIGNIFICANT CHANGE UP (ref 7–14)
ANION GAP SERPL CALC-SCNC: 9 MMOL/L — SIGNIFICANT CHANGE UP (ref 7–14)
APTT BLD: 29.1 SEC — SIGNIFICANT CHANGE UP (ref 27–39.2)
APTT BLD: 29.1 SEC — SIGNIFICANT CHANGE UP (ref 27–39.2)
AST SERPL-CCNC: 24 U/L — SIGNIFICANT CHANGE UP (ref 0–41)
AST SERPL-CCNC: 24 U/L — SIGNIFICANT CHANGE UP (ref 0–41)
BASOPHILS # BLD AUTO: 0.03 K/UL — SIGNIFICANT CHANGE UP (ref 0–0.2)
BASOPHILS # BLD AUTO: 0.03 K/UL — SIGNIFICANT CHANGE UP (ref 0–0.2)
BASOPHILS NFR BLD AUTO: 0.4 % — SIGNIFICANT CHANGE UP (ref 0–1)
BASOPHILS NFR BLD AUTO: 0.4 % — SIGNIFICANT CHANGE UP (ref 0–1)
BILIRUB SERPL-MCNC: 0.5 MG/DL — SIGNIFICANT CHANGE UP (ref 0.2–1.2)
BILIRUB SERPL-MCNC: 0.5 MG/DL — SIGNIFICANT CHANGE UP (ref 0.2–1.2)
BLD GP AB SCN SERPL QL: SIGNIFICANT CHANGE UP
BLD GP AB SCN SERPL QL: SIGNIFICANT CHANGE UP
BUN SERPL-MCNC: 15 MG/DL — SIGNIFICANT CHANGE UP (ref 10–20)
BUN SERPL-MCNC: 15 MG/DL — SIGNIFICANT CHANGE UP (ref 10–20)
CALCIUM SERPL-MCNC: 8.7 MG/DL — SIGNIFICANT CHANGE UP (ref 8.4–10.5)
CALCIUM SERPL-MCNC: 8.7 MG/DL — SIGNIFICANT CHANGE UP (ref 8.4–10.5)
CHLORIDE SERPL-SCNC: 105 MMOL/L — SIGNIFICANT CHANGE UP (ref 98–110)
CHLORIDE SERPL-SCNC: 105 MMOL/L — SIGNIFICANT CHANGE UP (ref 98–110)
CO2 SERPL-SCNC: 26 MMOL/L — SIGNIFICANT CHANGE UP (ref 17–32)
CO2 SERPL-SCNC: 26 MMOL/L — SIGNIFICANT CHANGE UP (ref 17–32)
CREAT SERPL-MCNC: 0.6 MG/DL — LOW (ref 0.7–1.5)
CREAT SERPL-MCNC: 0.6 MG/DL — LOW (ref 0.7–1.5)
EGFR: 109 ML/MIN/1.73M2 — SIGNIFICANT CHANGE UP
EGFR: 109 ML/MIN/1.73M2 — SIGNIFICANT CHANGE UP
EOSINOPHIL # BLD AUTO: 0.11 K/UL — SIGNIFICANT CHANGE UP (ref 0–0.7)
EOSINOPHIL # BLD AUTO: 0.11 K/UL — SIGNIFICANT CHANGE UP (ref 0–0.7)
EOSINOPHIL NFR BLD AUTO: 1.6 % — SIGNIFICANT CHANGE UP (ref 0–8)
EOSINOPHIL NFR BLD AUTO: 1.6 % — SIGNIFICANT CHANGE UP (ref 0–8)
GLUCOSE SERPL-MCNC: 112 MG/DL — HIGH (ref 70–99)
GLUCOSE SERPL-MCNC: 112 MG/DL — HIGH (ref 70–99)
HCT VFR BLD CALC: 34.2 % — LOW (ref 37–47)
HCT VFR BLD CALC: 34.2 % — LOW (ref 37–47)
HGB BLD-MCNC: 10.7 G/DL — LOW (ref 12–16)
HGB BLD-MCNC: 10.7 G/DL — LOW (ref 12–16)
IMM GRANULOCYTES NFR BLD AUTO: 0.3 % — SIGNIFICANT CHANGE UP (ref 0.1–0.3)
IMM GRANULOCYTES NFR BLD AUTO: 0.3 % — SIGNIFICANT CHANGE UP (ref 0.1–0.3)
INR BLD: 1.15 RATIO — SIGNIFICANT CHANGE UP (ref 0.65–1.3)
INR BLD: 1.15 RATIO — SIGNIFICANT CHANGE UP (ref 0.65–1.3)
LYMPHOCYTES # BLD AUTO: 0.69 K/UL — LOW (ref 1.2–3.4)
LYMPHOCYTES # BLD AUTO: 0.69 K/UL — LOW (ref 1.2–3.4)
LYMPHOCYTES # BLD AUTO: 9.8 % — LOW (ref 20.5–51.1)
LYMPHOCYTES # BLD AUTO: 9.8 % — LOW (ref 20.5–51.1)
MCHC RBC-ENTMCNC: 27.9 PG — SIGNIFICANT CHANGE UP (ref 27–31)
MCHC RBC-ENTMCNC: 27.9 PG — SIGNIFICANT CHANGE UP (ref 27–31)
MCHC RBC-ENTMCNC: 31.3 G/DL — LOW (ref 32–37)
MCHC RBC-ENTMCNC: 31.3 G/DL — LOW (ref 32–37)
MCV RBC AUTO: 89.1 FL — SIGNIFICANT CHANGE UP (ref 81–99)
MCV RBC AUTO: 89.1 FL — SIGNIFICANT CHANGE UP (ref 81–99)
MONOCYTES # BLD AUTO: 0.8 K/UL — HIGH (ref 0.1–0.6)
MONOCYTES # BLD AUTO: 0.8 K/UL — HIGH (ref 0.1–0.6)
MONOCYTES NFR BLD AUTO: 11.4 % — HIGH (ref 1.7–9.3)
MONOCYTES NFR BLD AUTO: 11.4 % — HIGH (ref 1.7–9.3)
NEUTROPHILS # BLD AUTO: 5.38 K/UL — SIGNIFICANT CHANGE UP (ref 1.4–6.5)
NEUTROPHILS # BLD AUTO: 5.38 K/UL — SIGNIFICANT CHANGE UP (ref 1.4–6.5)
NEUTROPHILS NFR BLD AUTO: 76.5 % — HIGH (ref 42.2–75.2)
NEUTROPHILS NFR BLD AUTO: 76.5 % — HIGH (ref 42.2–75.2)
NRBC # BLD: 0 /100 WBCS — SIGNIFICANT CHANGE UP (ref 0–0)
NRBC # BLD: 0 /100 WBCS — SIGNIFICANT CHANGE UP (ref 0–0)
PLATELET # BLD AUTO: 202 K/UL — SIGNIFICANT CHANGE UP (ref 130–400)
PLATELET # BLD AUTO: 202 K/UL — SIGNIFICANT CHANGE UP (ref 130–400)
PMV BLD: 9.3 FL — SIGNIFICANT CHANGE UP (ref 7.4–10.4)
PMV BLD: 9.3 FL — SIGNIFICANT CHANGE UP (ref 7.4–10.4)
POTASSIUM SERPL-MCNC: 4.2 MMOL/L — SIGNIFICANT CHANGE UP (ref 3.5–5)
POTASSIUM SERPL-MCNC: 4.2 MMOL/L — SIGNIFICANT CHANGE UP (ref 3.5–5)
POTASSIUM SERPL-SCNC: 4.2 MMOL/L — SIGNIFICANT CHANGE UP (ref 3.5–5)
POTASSIUM SERPL-SCNC: 4.2 MMOL/L — SIGNIFICANT CHANGE UP (ref 3.5–5)
PROT SERPL-MCNC: 6.6 G/DL — SIGNIFICANT CHANGE UP (ref 6–8)
PROT SERPL-MCNC: 6.6 G/DL — SIGNIFICANT CHANGE UP (ref 6–8)
PROTHROM AB SERPL-ACNC: 13.1 SEC — HIGH (ref 9.95–12.87)
PROTHROM AB SERPL-ACNC: 13.1 SEC — HIGH (ref 9.95–12.87)
RBC # BLD: 3.84 M/UL — LOW (ref 4.2–5.4)
RBC # BLD: 3.84 M/UL — LOW (ref 4.2–5.4)
RBC # FLD: 18.1 % — HIGH (ref 11.5–14.5)
RBC # FLD: 18.1 % — HIGH (ref 11.5–14.5)
SODIUM SERPL-SCNC: 140 MMOL/L — SIGNIFICANT CHANGE UP (ref 135–146)
SODIUM SERPL-SCNC: 140 MMOL/L — SIGNIFICANT CHANGE UP (ref 135–146)
WBC # BLD: 7.03 K/UL — SIGNIFICANT CHANGE UP (ref 4.8–10.8)
WBC # BLD: 7.03 K/UL — SIGNIFICANT CHANGE UP (ref 4.8–10.8)
WBC # FLD AUTO: 7.03 K/UL — SIGNIFICANT CHANGE UP (ref 4.8–10.8)
WBC # FLD AUTO: 7.03 K/UL — SIGNIFICANT CHANGE UP (ref 4.8–10.8)

## 2023-12-26 PROCEDURE — 86901 BLOOD TYPING SEROLOGIC RH(D): CPT

## 2023-12-26 PROCEDURE — 93005 ELECTROCARDIOGRAM TRACING: CPT

## 2023-12-26 PROCEDURE — 86923 COMPATIBILITY TEST ELECTRIC: CPT

## 2023-12-26 PROCEDURE — 71046 X-RAY EXAM CHEST 2 VIEWS: CPT

## 2023-12-26 PROCEDURE — 86900 BLOOD TYPING SEROLOGIC ABO: CPT

## 2023-12-26 PROCEDURE — C1721: CPT

## 2023-12-26 PROCEDURE — C1751: CPT

## 2023-12-26 PROCEDURE — 97162 PT EVAL MOD COMPLEX 30 MIN: CPT | Mod: GP

## 2023-12-26 PROCEDURE — 85610 PROTHROMBIN TIME: CPT

## 2023-12-26 PROCEDURE — 93289 INTERROG DEVICE EVAL HEART: CPT

## 2023-12-26 PROCEDURE — 33249 INSJ/RPLCMT DEFIB W/LEAD(S): CPT

## 2023-12-26 PROCEDURE — 71045 X-RAY EXAM CHEST 1 VIEW: CPT

## 2023-12-26 PROCEDURE — 71045 X-RAY EXAM CHEST 1 VIEW: CPT | Mod: 26

## 2023-12-26 PROCEDURE — 85730 THROMBOPLASTIN TIME PARTIAL: CPT

## 2023-12-26 PROCEDURE — 97530 THERAPEUTIC ACTIVITIES: CPT | Mod: GP

## 2023-12-26 PROCEDURE — 33235 REMOVAL PACEMAKER ELECTRODE: CPT

## 2023-12-26 PROCEDURE — 85025 COMPLETE CBC W/AUTO DIFF WBC: CPT

## 2023-12-26 PROCEDURE — 71045 X-RAY EXAM CHEST 1 VIEW: CPT | Mod: 26,77

## 2023-12-26 PROCEDURE — 80053 COMPREHEN METABOLIC PANEL: CPT

## 2023-12-26 PROCEDURE — C9399: CPT

## 2023-12-26 PROCEDURE — 97110 THERAPEUTIC EXERCISES: CPT | Mod: GP

## 2023-12-26 PROCEDURE — C1894: CPT

## 2023-12-26 PROCEDURE — C1889: CPT

## 2023-12-26 PROCEDURE — 86850 RBC ANTIBODY SCREEN: CPT

## 2023-12-26 PROCEDURE — 36415 COLL VENOUS BLD VENIPUNCTURE: CPT

## 2023-12-26 PROCEDURE — C1777: CPT

## 2023-12-26 RX ORDER — INFLUENZA VIRUS VACCINE 15; 15; 15; 15 UG/.5ML; UG/.5ML; UG/.5ML; UG/.5ML
0.5 SUSPENSION INTRAMUSCULAR ONCE
Refills: 0 | Status: DISCONTINUED | OUTPATIENT
Start: 2023-12-26 | End: 2023-12-27

## 2023-12-26 RX ORDER — ACETAMINOPHEN 500 MG
650 TABLET ORAL EVERY 6 HOURS
Refills: 0 | Status: DISCONTINUED | OUTPATIENT
Start: 2023-12-26 | End: 2023-12-27

## 2023-12-26 RX ORDER — OXYCODONE HYDROCHLORIDE 5 MG/1
5 TABLET ORAL EVERY 4 HOURS
Refills: 0 | Status: DISCONTINUED | OUTPATIENT
Start: 2023-12-26 | End: 2023-12-27

## 2023-12-26 RX ORDER — POLYETHYLENE GLYCOL 3350 17 G/17G
17 POWDER, FOR SOLUTION ORAL DAILY
Refills: 0 | Status: DISCONTINUED | OUTPATIENT
Start: 2023-12-26 | End: 2023-12-27

## 2023-12-26 RX ORDER — ACETAMINOPHEN 500 MG
1000 TABLET ORAL ONCE
Refills: 0 | Status: COMPLETED | OUTPATIENT
Start: 2023-12-26 | End: 2023-12-26

## 2023-12-26 RX ORDER — CEFAZOLIN SODIUM 1 G
2000 VIAL (EA) INJECTION ONCE
Refills: 0 | Status: DISCONTINUED | OUTPATIENT
Start: 2023-12-26 | End: 2023-12-26

## 2023-12-26 RX ORDER — CEFAZOLIN SODIUM 1 G
2000 VIAL (EA) INJECTION EVERY 8 HOURS
Refills: 0 | Status: COMPLETED | OUTPATIENT
Start: 2023-12-26 | End: 2023-12-27

## 2023-12-26 RX ORDER — CEFAZOLIN SODIUM 1 G
1000 VIAL (EA) INJECTION ONCE
Refills: 0 | Status: DISCONTINUED | OUTPATIENT
Start: 2023-12-26 | End: 2023-12-26

## 2023-12-26 RX ADMIN — Medication 1000 MILLIGRAM(S): at 15:00

## 2023-12-26 RX ADMIN — OXYCODONE HYDROCHLORIDE 5 MILLIGRAM(S): 5 TABLET ORAL at 18:15

## 2023-12-26 RX ADMIN — Medication 650 MILLIGRAM(S): at 21:13

## 2023-12-26 RX ADMIN — Medication 100 MILLIGRAM(S): at 17:26

## 2023-12-26 RX ADMIN — OXYCODONE HYDROCHLORIDE 5 MILLIGRAM(S): 5 TABLET ORAL at 23:59

## 2023-12-26 RX ADMIN — OXYCODONE HYDROCHLORIDE 5 MILLIGRAM(S): 5 TABLET ORAL at 12:30

## 2023-12-26 RX ADMIN — Medication 650 MILLIGRAM(S): at 21:43

## 2023-12-26 RX ADMIN — OXYCODONE HYDROCHLORIDE 5 MILLIGRAM(S): 5 TABLET ORAL at 23:50

## 2023-12-26 RX ADMIN — OXYCODONE HYDROCHLORIDE 5 MILLIGRAM(S): 5 TABLET ORAL at 12:00

## 2023-12-26 RX ADMIN — Medication 400 MILLIGRAM(S): at 15:00

## 2023-12-26 RX ADMIN — Medication 100 MILLIGRAM(S): at 23:50

## 2023-12-26 NOTE — H&P ADULT - NSHPLABSRESULTS_GEN_ALL_CORE
12/18/2023    8.0/11.3/37.6/244  138/4.3/98/27/14/0.9/9.5/103  7.6/4.1/0.7/26/19/140  15.2/1.33/32.8

## 2023-12-26 NOTE — ASU PATIENT PROFILE, ADULT - FALL HARM RISK - RISK INTERVENTIONS
Assistance OOB with selected safe patient handling equipment/Assistance with ambulation/Communicate Fall Risk and Risk Factors to all staff, patient, and family/Discuss with provider need for PT consult/Monitor gait and stability/Provide patient with walking aids - walker, cane, crutches/Reinforce activity limits and safety measures with patient and family/Visual Cue: Yellow wristband/Bed in lowest position, wheels locked, appropriate side rails in place/Call bell, personal items and telephone in reach/Instruct patient to call for assistance before getting out of bed or chair/Non-slip footwear when patient is out of bed/[place] to call system/Physically safe environment - no spills, clutter or unnecessary equipment/Purposeful Proactive Rounding/Room/bathroom lighting operational, light cord in reach Assistance OOB with selected safe patient handling equipment/Assistance with ambulation/Communicate Fall Risk and Risk Factors to all staff, patient, and family/Discuss with provider need for PT consult/Monitor gait and stability/Provide patient with walking aids - walker, cane, crutches/Reinforce activity limits and safety measures with patient and family/Visual Cue: Yellow wristband/Bed in lowest position, wheels locked, appropriate side rails in place/Call bell, personal items and telephone in reach/Instruct patient to call for assistance before getting out of bed or chair/Non-slip footwear when patient is out of bed/[place] to call system/Physically safe environment - no spills, clutter or unnecessary equipment/Purposeful Proactive Rounding/Room/bathroom lighting operational, light cord in reach

## 2023-12-26 NOTE — PHYSICAL THERAPY INITIAL EVALUATION ADULT - MD/RN NOTIFIED
Nerve Block    Date/Time: 7/18/2022 6:58 AM  Performed by: Carole Strauss DO  Authorized by: Carole Strauss DO     Block Type: adductor canal  Laterality:  Left  Patient Location:  Pre-op  Indication: post-op pain management at surgeon's request and at surgeon's request    Surgeon:  Rubi Perera  Preanesthetic Checklist Patient Identified (2 criteria), Block Plan Confirmed, Resuscitation Equipment Available, Supplemental O2 (if needed), Allergies Confirmed, Block Site Marked (if applicable), Monitors Applied, Aseptic Technique, Coagulation Status, Necessary Block Equipment Present, Timeout Performed, IV Access Functioning, Consent Verified, Drugs/Solutions Labeled and Sedation Given (if needed)    Patient Position:  Supine  Prep:  Chlorhexidine gluconate (CHG)  Max Sterile Barrier Technique:  Hand washing, cap/mask, sterile gloves, sterile gel, sterile probe cover and sterile towel drapes  Monitoring:  Continuous pulse oximetry, EKG, blood pressure and heart rate  Injection Technique:  Single-shot  Procedures: ultrasound guided and ultrasound permanent image saved    Local Infiltration:  Lidocaine  Strength:  1%  Dose:  3 mL  Needle Type:  Echogenic  Needle Gauge:  22 G  Needle Length:  5 cm  Needle Localization:  Ultrasound guidance   in-plane  Physical status during block:  Awake  Medications Administered  bupivacaine (MARCAINE) 0.5 % - Infiltration   30 mL - 7/18/2022 7:02:00 AM  Injection Assessment:  No paresthesia on injection, no resistance to injection, negative aspiration for heme, incremental injection and local visualized surrounding nerve on ultrasound  Patient Condition:  Tolerated well, no immediate complications  Paresthesia Pain:  None  Heart Rate Change: No    Slowly Injected: Yes    Performed By:  Anesthesiologist  Start Time:7/18/2022 6:58 AM  Stop Time: 7/18/2022 7:03 AM   Ultrasound guidance utilized to identify needle tip, nerve, and all vasculature yes

## 2023-12-26 NOTE — PATIENT PROFILE ADULT - FALL HARM RISK - UNIVERSAL INTERVENTIONS
Bed in lowest position, wheels locked, appropriate side rails in place/Call bell, personal items and telephone in reach/Instruct patient to call for assistance before getting out of bed or chair/Non-slip footwear when patient is out of bed/[place] to call system/Physically safe environment - no spills, clutter or unnecessary equipment/Purposeful Proactive Rounding/Room/bathroom lighting operational, light cord in reach Bed in lowest position, wheels locked, appropriate side rails in place/Call bell, personal items and telephone in reach/Instruct patient to call for assistance before getting out of bed or chair/Non-slip footwear when patient is out of bed/[place] to call system/Physically safe environment - no spills, clutter or unnecessary equipment/Purposeful Proactive Rounding/Room/bathroom lighting operational, light cord in reach

## 2023-12-26 NOTE — PHYSICAL THERAPY INITIAL EVALUATION ADULT - GAIT DEVIATIONS NOTED, PT EVAL
Decreased sped, mild forward flexed posture/decreased mikaela/decreased step length/decreased stride length

## 2023-12-26 NOTE — H&P ADULT - HISTORY OF PRESENT ILLNESS
48-year-old woman with morbid obesity, moderate pulmonary hypertension, KANDICE on CPAP, [facility] (stopped [facility]to), LE DVT, KANDICE c/w CPAP, Raynaud's syndrome, former smoker, paroxysmal atrial fibrillation and atrial aflutter 2:1, Tachy-adriana syndrome. pause 6.2 seconds, s/p Left bundle area pacing on 2023, multiple episodes of symptomatic ventricular tachycardia lasting 9-11 seconds associated with symptoms.  The patient has been experiencing brief episodes of moderate to severe dizziness, palpitations, and presyncope. She denies chest pain/discomfort, dyspnea or any syncopal episodes. Patient is severely claustrophobic.  Patient now presents for recommended upgrade to dual chamber ICD and extraction of pacemaker lead  ?  NSVT:  2023; VT 2.5 sec at 214 bpm  2023 9 seconds VT at 200 bpm; at 8 pm associated with presyncope and moderate to severe dizziness.  2023; 11 seconds VT at 360 ms, 11 pm  2023: 11 seconds VT at 200 bpm    Cardiology Summary        EC2023: Sinus tachycardia at 103 bpm with occasional PVCs, IRBBB  : sinus tachycardia at 109 bpm;  2023 tach ( bpm) (AFl with variable block?)    Echo: TTE 2023: LVEF 55-60%, ascending aorta 3.7 cm across, moderate TR/ pulmonary HTN.    Cardiac Cath/PCI: Cath 2023 Mild CAD. Mod pulm HTN. Likely combination of HFpEF, obesity hypoventilation syndrome and Raynaud. 48-year-old woman with morbid obesity, moderate pulmonary hypertension, KANDICE on CPAP, AF s/p ablation 6 yrs ago at [facility] (stopped taking Xarelto), LE DVT, KANDICE c/w CPAP, Raynaud's syndrome, former smoker, paroxysmal atrial fibrillation and atrial aflutter 2:1, Tachy-adriana syndrome. pause 6.2 seconds, s/p Left bundle area pacing on 2023, multiple episodes of symptomatic ventricular tachycardia lasting 9-11 seconds associated with symptoms.  The patient has been experiencing brief episodes of moderate to severe dizziness, palpitations, and presyncope. She denies chest pain/discomfort, dyspnea or any syncopal episodes. Patient is severely claustrophobic.  Patient now presents for recommended upgrade to dual chamber ICD and extraction of pacemaker lead  ?  NSVT:  2023; VT 2.5 sec at 214 bpm  2023 9 seconds VT at 200 bpm; at 8 pm associated with presyncope and moderate to severe dizziness.  2023; 11 seconds VT at 360 ms, 11 pm  2023: 11 seconds VT at 200 bpm    Cardiology Summary        EC2023: Sinus tachycardia at 103 bpm with occasional PVCs, IRBBB  : sinus tachycardia at 109 bpm;  2023 tach ( bpm) (AFl with variable block?)    Echo: TTE 2023: LVEF 55-60%, ascending aorta 3.7 cm across, moderate TR/ pulmonary HTN.    Cardiac Cath/PCI: Cath 2023 Mild CAD. Mod pulm HTN. Likely combination of HFpEF, obesity hypoventilation syndrome and Raynaud.

## 2023-12-26 NOTE — H&P ADULT - NSICDXPASTMEDICALHX_GEN_ALL_CORE_FT
PAST MEDICAL HISTORY:  Afib     Back pain     DVT, lower extremity     H/O Raynaud's syndrome     H/O ventricular tachycardia     HTN (hypertension)     KANDICE on CPAP

## 2023-12-26 NOTE — H&P ADULT - NSHPPHYSICALEXAM_GEN_ALL_CORE
Constitutional:  well developed, well nourished, no acute distress, obese.    Eyes:  normal conjunctiva.    Neck:  normal venous pressure.    Cardiac:  normal S1, S2, no murmur . tachycardic.    Pulmonary:  clear lung fields, good air entry, no respiratory distress.    Abdomen:  abdomen soft.    Musculoskeletal:  normal gait.    Extremities:  no edema.    Skin:  no rash.    Neurological:  moves all extremities, normal speech.    Psychiatric:  alert and oriented, normal memory.  Skin: The surgical incision was located on the left infraclavicular area. The incision was clean, dry and well-healed.  ?

## 2023-12-26 NOTE — ASU PATIENT PROFILE, ADULT - REASON FOR ADMISSION, PROFILE
Scribe Attestation (For Scribes USE Only)... Attending Attestation (For Attendings USE Only).../Scribe Attestation (For Scribes USE Only)... ICD implant

## 2023-12-26 NOTE — PHYSICAL THERAPY INITIAL EVALUATION ADULT - GENERAL OBSERVATIONS, REHAB EVAL
Pt pending upgrade to dual chamber ICD and extraction of pacemaker lead today, currently on bedrest. PT to f/u after procedure when appropriate and medically cleared for OOB with PT, as needed.
1345 - 1415 Pt rec in b/s recliner with +tele, +NC 3 L/m, +A line, in NAD with  at b/s.

## 2023-12-26 NOTE — H&P ADULT - ASSESSMENT
48-year-old woman with morbid obesity, moderate pulmonary hypertension, KANDICE on CPAP, AF s/p ablation 6 yrs ago at [facility] (stopped taking Xarelto), LE DVT, KANDICE c/w CPAP, Raynaud's syndrome, former smoker, paroxysmal atrial fibrillation and atrial aflutter 2:1, Tachy-adriana syndrome. pause 6.2 seconds, s/p Left bundle area pacing on 8/30/2023, multiple episodes of symptomatic ventricular tachycardia lasting 9-11 seconds associated with symptoms.  Patient now presents for recommended upgrade to dual chamber ICD and extraction of pacemaker lead    Proceed with procedure  bedrest per protocol  EKG in AM  Antibiotic ptx  CXR postop and in AM  anticipated discharge in AM                                       48-year-old woman with morbid obesity, moderate pulmonary hypertension, KANDICE on CPAP, AF s/p ablation 6 yrs ago at [facility] (stopped taking Xarelto), LE DVT, KANDICE c/w CPAP, Raynaud's syndrome, former smoker, paroxysmal atrial fibrillation and atrial aflutter 2:1, Tachy-adriana syndrome. pause 6.2 seconds, s/p Left bundle area pacing on 8/30/2023, multiple episodes of symptomatic ventricular tachycardia lasting 9-11 seconds associated with symptoms.  Patient now presents for recommended upgrade to dual chamber ICD and extraction of pacemaker lead    Proceed with procedure  bedrest per protocol  EKG in AM  Antibiotic ptx  CXR postop and in AM  anticipated discharge in AM

## 2023-12-26 NOTE — PHYSICAL THERAPY INITIAL EVALUATION ADULT - GAIT TRAINING, PT EVAL
by discharge: Pt will amb 400 ft independently using SC as needed and negotiate 5 steps with supervision using handrail.

## 2023-12-26 NOTE — PHYSICAL THERAPY INITIAL EVALUATION ADULT - ADDITIONAL COMMENTS
Pt report she lives with  in pvt home with 3 DEANDRE, no stairs indoors, reports independent with functional mobility using SC as needed due to b/l knee OA.

## 2023-12-27 ENCOUNTER — TRANSCRIPTION ENCOUNTER (OUTPATIENT)
Age: 48
End: 2023-12-27

## 2023-12-27 VITALS
SYSTOLIC BLOOD PRESSURE: 105 MMHG | RESPIRATION RATE: 19 BRPM | HEART RATE: 86 BPM | DIASTOLIC BLOOD PRESSURE: 64 MMHG | OXYGEN SATURATION: 98 %

## 2023-12-27 PROBLEM — Z86.79 PERSONAL HISTORY OF OTHER DISEASES OF THE CIRCULATORY SYSTEM: Chronic | Status: ACTIVE | Noted: 2023-12-26

## 2023-12-27 LAB
ALBUMIN SERPL ELPH-MCNC: 3.6 G/DL — SIGNIFICANT CHANGE UP (ref 3.5–5.2)
ALBUMIN SERPL ELPH-MCNC: 3.6 G/DL — SIGNIFICANT CHANGE UP (ref 3.5–5.2)
ALP SERPL-CCNC: 121 U/L — HIGH (ref 30–115)
ALP SERPL-CCNC: 121 U/L — HIGH (ref 30–115)
ALT FLD-CCNC: 16 U/L — SIGNIFICANT CHANGE UP (ref 0–41)
ALT FLD-CCNC: 16 U/L — SIGNIFICANT CHANGE UP (ref 0–41)
ANION GAP SERPL CALC-SCNC: 10 MMOL/L — SIGNIFICANT CHANGE UP (ref 7–14)
ANION GAP SERPL CALC-SCNC: 10 MMOL/L — SIGNIFICANT CHANGE UP (ref 7–14)
AST SERPL-CCNC: 24 U/L — SIGNIFICANT CHANGE UP (ref 0–41)
AST SERPL-CCNC: 24 U/L — SIGNIFICANT CHANGE UP (ref 0–41)
BASOPHILS # BLD AUTO: 0.03 K/UL — SIGNIFICANT CHANGE UP (ref 0–0.2)
BASOPHILS # BLD AUTO: 0.03 K/UL — SIGNIFICANT CHANGE UP (ref 0–0.2)
BASOPHILS NFR BLD AUTO: 0.4 % — SIGNIFICANT CHANGE UP (ref 0–1)
BASOPHILS NFR BLD AUTO: 0.4 % — SIGNIFICANT CHANGE UP (ref 0–1)
BILIRUB SERPL-MCNC: 0.6 MG/DL — SIGNIFICANT CHANGE UP (ref 0.2–1.2)
BILIRUB SERPL-MCNC: 0.6 MG/DL — SIGNIFICANT CHANGE UP (ref 0.2–1.2)
BUN SERPL-MCNC: 16 MG/DL — SIGNIFICANT CHANGE UP (ref 10–20)
BUN SERPL-MCNC: 16 MG/DL — SIGNIFICANT CHANGE UP (ref 10–20)
CALCIUM SERPL-MCNC: 8.9 MG/DL — SIGNIFICANT CHANGE UP (ref 8.4–10.4)
CALCIUM SERPL-MCNC: 8.9 MG/DL — SIGNIFICANT CHANGE UP (ref 8.4–10.4)
CHLORIDE SERPL-SCNC: 105 MMOL/L — SIGNIFICANT CHANGE UP (ref 98–110)
CHLORIDE SERPL-SCNC: 105 MMOL/L — SIGNIFICANT CHANGE UP (ref 98–110)
CO2 SERPL-SCNC: 27 MMOL/L — SIGNIFICANT CHANGE UP (ref 17–32)
CO2 SERPL-SCNC: 27 MMOL/L — SIGNIFICANT CHANGE UP (ref 17–32)
CREAT SERPL-MCNC: 0.7 MG/DL — SIGNIFICANT CHANGE UP (ref 0.7–1.5)
CREAT SERPL-MCNC: 0.7 MG/DL — SIGNIFICANT CHANGE UP (ref 0.7–1.5)
EGFR: 105 ML/MIN/1.73M2 — SIGNIFICANT CHANGE UP
EGFR: 105 ML/MIN/1.73M2 — SIGNIFICANT CHANGE UP
EOSINOPHIL # BLD AUTO: 0.16 K/UL — SIGNIFICANT CHANGE UP (ref 0–0.7)
EOSINOPHIL # BLD AUTO: 0.16 K/UL — SIGNIFICANT CHANGE UP (ref 0–0.7)
EOSINOPHIL NFR BLD AUTO: 2 % — SIGNIFICANT CHANGE UP (ref 0–8)
EOSINOPHIL NFR BLD AUTO: 2 % — SIGNIFICANT CHANGE UP (ref 0–8)
GLUCOSE SERPL-MCNC: 114 MG/DL — HIGH (ref 70–99)
GLUCOSE SERPL-MCNC: 114 MG/DL — HIGH (ref 70–99)
HCT VFR BLD CALC: 34.8 % — LOW (ref 37–47)
HCT VFR BLD CALC: 34.8 % — LOW (ref 37–47)
HGB BLD-MCNC: 10.7 G/DL — LOW (ref 12–16)
HGB BLD-MCNC: 10.7 G/DL — LOW (ref 12–16)
IMM GRANULOCYTES NFR BLD AUTO: 0.3 % — SIGNIFICANT CHANGE UP (ref 0.1–0.3)
IMM GRANULOCYTES NFR BLD AUTO: 0.3 % — SIGNIFICANT CHANGE UP (ref 0.1–0.3)
LYMPHOCYTES # BLD AUTO: 1.01 K/UL — LOW (ref 1.2–3.4)
LYMPHOCYTES # BLD AUTO: 1.01 K/UL — LOW (ref 1.2–3.4)
LYMPHOCYTES # BLD AUTO: 12.7 % — LOW (ref 20.5–51.1)
LYMPHOCYTES # BLD AUTO: 12.7 % — LOW (ref 20.5–51.1)
MCHC RBC-ENTMCNC: 27.6 PG — SIGNIFICANT CHANGE UP (ref 27–31)
MCHC RBC-ENTMCNC: 27.6 PG — SIGNIFICANT CHANGE UP (ref 27–31)
MCHC RBC-ENTMCNC: 30.7 G/DL — LOW (ref 32–37)
MCHC RBC-ENTMCNC: 30.7 G/DL — LOW (ref 32–37)
MCV RBC AUTO: 89.9 FL — SIGNIFICANT CHANGE UP (ref 81–99)
MCV RBC AUTO: 89.9 FL — SIGNIFICANT CHANGE UP (ref 81–99)
MONOCYTES # BLD AUTO: 1.17 K/UL — HIGH (ref 0.1–0.6)
MONOCYTES # BLD AUTO: 1.17 K/UL — HIGH (ref 0.1–0.6)
MONOCYTES NFR BLD AUTO: 14.7 % — HIGH (ref 1.7–9.3)
MONOCYTES NFR BLD AUTO: 14.7 % — HIGH (ref 1.7–9.3)
NEUTROPHILS # BLD AUTO: 5.56 K/UL — SIGNIFICANT CHANGE UP (ref 1.4–6.5)
NEUTROPHILS # BLD AUTO: 5.56 K/UL — SIGNIFICANT CHANGE UP (ref 1.4–6.5)
NEUTROPHILS NFR BLD AUTO: 69.9 % — SIGNIFICANT CHANGE UP (ref 42.2–75.2)
NEUTROPHILS NFR BLD AUTO: 69.9 % — SIGNIFICANT CHANGE UP (ref 42.2–75.2)
NRBC # BLD: 0 /100 WBCS — SIGNIFICANT CHANGE UP (ref 0–0)
NRBC # BLD: 0 /100 WBCS — SIGNIFICANT CHANGE UP (ref 0–0)
PLATELET # BLD AUTO: 208 K/UL — SIGNIFICANT CHANGE UP (ref 130–400)
PLATELET # BLD AUTO: 208 K/UL — SIGNIFICANT CHANGE UP (ref 130–400)
PMV BLD: 9.3 FL — SIGNIFICANT CHANGE UP (ref 7.4–10.4)
PMV BLD: 9.3 FL — SIGNIFICANT CHANGE UP (ref 7.4–10.4)
POTASSIUM SERPL-MCNC: 4.2 MMOL/L — SIGNIFICANT CHANGE UP (ref 3.5–5)
POTASSIUM SERPL-MCNC: 4.2 MMOL/L — SIGNIFICANT CHANGE UP (ref 3.5–5)
POTASSIUM SERPL-SCNC: 4.2 MMOL/L — SIGNIFICANT CHANGE UP (ref 3.5–5)
POTASSIUM SERPL-SCNC: 4.2 MMOL/L — SIGNIFICANT CHANGE UP (ref 3.5–5)
PROT SERPL-MCNC: 6.8 G/DL — SIGNIFICANT CHANGE UP (ref 6–8)
PROT SERPL-MCNC: 6.8 G/DL — SIGNIFICANT CHANGE UP (ref 6–8)
RBC # BLD: 3.87 M/UL — LOW (ref 4.2–5.4)
RBC # BLD: 3.87 M/UL — LOW (ref 4.2–5.4)
RBC # FLD: 18.1 % — HIGH (ref 11.5–14.5)
RBC # FLD: 18.1 % — HIGH (ref 11.5–14.5)
SODIUM SERPL-SCNC: 142 MMOL/L — SIGNIFICANT CHANGE UP (ref 135–146)
SODIUM SERPL-SCNC: 142 MMOL/L — SIGNIFICANT CHANGE UP (ref 135–146)
WBC # BLD: 7.95 K/UL — SIGNIFICANT CHANGE UP (ref 4.8–10.8)
WBC # BLD: 7.95 K/UL — SIGNIFICANT CHANGE UP (ref 4.8–10.8)
WBC # FLD AUTO: 7.95 K/UL — SIGNIFICANT CHANGE UP (ref 4.8–10.8)
WBC # FLD AUTO: 7.95 K/UL — SIGNIFICANT CHANGE UP (ref 4.8–10.8)

## 2023-12-27 PROCEDURE — 71045 X-RAY EXAM CHEST 1 VIEW: CPT | Mod: 26,59

## 2023-12-27 PROCEDURE — 93010 ELECTROCARDIOGRAM REPORT: CPT

## 2023-12-27 PROCEDURE — 71046 X-RAY EXAM CHEST 2 VIEWS: CPT | Mod: 26

## 2023-12-27 RX ORDER — METOPROLOL TARTRATE 50 MG
1 TABLET ORAL
Refills: 0 | DISCHARGE

## 2023-12-27 RX ORDER — METOPROLOL TARTRATE 50 MG
1 TABLET ORAL
Qty: 30 | Refills: 0
Start: 2023-12-27 | End: 2024-01-25

## 2023-12-27 RX ORDER — FENTANYL CITRATE 50 UG/ML
25 INJECTION INTRAVENOUS ONCE
Refills: 0 | Status: DISCONTINUED | OUTPATIENT
Start: 2023-12-27 | End: 2023-12-27

## 2023-12-27 RX ORDER — METOPROLOL TARTRATE 50 MG
25 TABLET ORAL DAILY
Refills: 0 | Status: DISCONTINUED | OUTPATIENT
Start: 2023-12-27 | End: 2023-12-27

## 2023-12-27 RX ORDER — POLYETHYLENE GLYCOL 3350 17 G/17G
17 POWDER, FOR SOLUTION ORAL
Qty: 0 | Refills: 0 | DISCHARGE
Start: 2023-12-27

## 2023-12-27 RX ORDER — DILTIAZEM HCL 120 MG
240 CAPSULE, EXT RELEASE 24 HR ORAL DAILY
Refills: 0 | Status: DISCONTINUED | OUTPATIENT
Start: 2023-12-27 | End: 2023-12-27

## 2023-12-27 RX ORDER — CEPHALEXIN 500 MG
1 CAPSULE ORAL
Qty: 10 | Refills: 0
Start: 2023-12-27 | End: 2023-12-31

## 2023-12-27 RX ADMIN — Medication 100 MILLIGRAM(S): at 08:34

## 2023-12-27 RX ADMIN — FENTANYL CITRATE 25 MICROGRAM(S): 50 INJECTION INTRAVENOUS at 00:41

## 2023-12-27 RX ADMIN — OXYCODONE HYDROCHLORIDE 5 MILLIGRAM(S): 5 TABLET ORAL at 11:49

## 2023-12-27 RX ADMIN — FENTANYL CITRATE 25 MICROGRAM(S): 50 INJECTION INTRAVENOUS at 00:40

## 2023-12-27 RX ADMIN — Medication 650 MILLIGRAM(S): at 06:56

## 2023-12-27 RX ADMIN — Medication 650 MILLIGRAM(S): at 07:26

## 2023-12-27 RX ADMIN — OXYCODONE HYDROCHLORIDE 5 MILLIGRAM(S): 5 TABLET ORAL at 12:19

## 2023-12-27 RX ADMIN — Medication 240 MILLIGRAM(S): at 10:53

## 2023-12-27 RX ADMIN — Medication 25 MILLIGRAM(S): at 10:53

## 2023-12-27 NOTE — DISCHARGE NOTE NURSING/CASE MANAGEMENT/SOCIAL WORK - NSDCFUADDAPPT_GEN_ALL_CORE_FT
please follow up with dr araujo on 1/03 at 11am    please follow up with dr ibrahim in one month    please follow up with cardiology as scheduled

## 2023-12-27 NOTE — DISCHARGE NOTE PROVIDER - CARE PROVIDER_API CALL
Dominic Monique  Thoracic and Cardiac Surgery  [address] [place] [address], [address]  [place] [address]  Phone: [phone]  Fax: [phone]  Follow Up Time:     Marcelo Brown  Cardiac Electrophysiology  [address] [place] [address], [address]  [place] [address]  Phone: [phone]  Fax: [phone]  Follow Up Time:    Dominic Monique  Thoracic and Cardiac Surgery  [address] [place] [address], [address]  [place] [address]  Phone: [phone]  Fax: [phone]  Follow Up Time:     Marcelo Brown  Cardiac Electrophysiology  [address] [place] [address], [address]  [place] [address]  Phone: [phone]  Fax: [phone]  Follow Up Time:

## 2023-12-27 NOTE — PROGRESS NOTE ADULT - SUBJECTIVE AND OBJECTIVE BOX
INTERVAL HPI/OVERNIGHT EVENTS:  Pt is POD #1 PPM extraction with upgrade to DC ICD by Dr. Monique. No acute events over night. C/o occasional palpitations., Pressure dressing removed.    Patient denies fever, chills, dizziness, syncope, chest pain, palpitations, SOB, cough, abd pain, n/v/d/c, dysuria, hematuria or unusual rash.     MEDICATIONS  (STANDING):  diltiazem    milliGRAM(s) Oral daily  influenza   Vaccine 0.5 milliLiter(s) IntraMuscular once  metoprolol succinate ER 25 milliGRAM(s) Oral daily  polyethylene glycol 3350 17 Gram(s) Oral daily    MEDICATIONS  (PRN):  acetaminophen     Tablet .. 650 milliGRAM(s) Oral every 6 hours PRN Temp greater or equal to 38C (100.4F), Mild Pain (1 - 3)  oxyCODONE    IR 5 milliGRAM(s) Oral every 4 hours PRN Moderate Pain (4 - 6)      Allergies  No Known Allergies      REVIEW OF SYSTEMS    [x] A ten-point review of systems was otherwise negative except as noted.  [ ] Due to altered mental status/intubation, subjective information were not able to be obtained from the patient. History was obtained, to the extent possible, from review of the chart and collateral sources of information.      Vital Signs Last 24 Hrs  T(C): 36.7 (27 Dec 2023 04:00), Max: 36.7 (26 Dec 2023 16:00)  T(F): 98 (27 Dec 2023 04:00), Max: 98.1 (26 Dec 2023 16:00)  HR: 105 (27 Dec 2023 08:00) (66 - 112)  BP: 118/70 (27 Dec 2023 08:00) (107/71 - 132/63)  BP(mean): 87 (27 Dec 2023 08:00) (78 - 102)  RR: 17 (27 Dec 2023 08:00) (10 - 40)  SpO2: 98% (27 Dec 2023 08:00) (92% - 100%)    Parameters below as of 27 Dec 2023 08:00  Patient On (Oxygen Delivery Method): room air        12-26-23 @ 07:01  -  12-27-23 @ 07:00  --------------------------------------------------------  IN: 590 mL / OUT: 815 mL / NET: -225 mL    12-27-23 @ 07:01  -  12-27-23 @ 09:17  --------------------------------------------------------  IN: 50 mL / OUT: 0 mL / NET: 50 mL        Physical Exam  GENERAL: In no apparent distress, well nourished, and hydrated.  HEART: Regular rate and rhythm; No murmurs, rubs, or gallops.  PULMONARY: Clear to auscultation and perfusion.  No rales, wheezing, or rhonchi bilaterally.  CHEST: Surgical dressing in place, no hematoma or abnormal drainage noted.   ABDOMEN: Soft, Nontender, Nondistended; Bowel sounds present  EXTREMITIES:  2+ Peripheral Pulses, No clubbing, cyanosis, or edema  NEUROLOGICAL: AO x4 ,FONSECA, speech clear    LABS:                        10.7   7.95  )-----------( 208      ( 27 Dec 2023 03:26 )             34.8     12-27    142  |  105  |  16  ----------------------------<  114<H>  4.2   |  27  |  0.7    Ca    8.9      27 Dec 2023 03:26    TPro  6.8  /  Alb  3.6  /  TBili  0.6  /  DBili  x   /  AST  24  /  ALT  16  /  AlkPhos  121<H>  12-27    PT/INR - ( 26 Dec 2023 11:50 )   PT: 13.10 sec;   INR: 1.15 ratio         PTT - ( 26 Dec 2023 11:50 )  PTT:29.1 sec  Urinalysis Basic - ( 27 Dec 2023 03:26 )    Color: x / Appearance: x / SG: x / pH: x  Gluc: 114 mg/dL / Ketone: x  / Bili: x / Urobili: x   Blood: x / Protein: x / Nitrite: x   Leuk Esterase: x / RBC: x / WBC x   Sq Epi: x / Non Sq Epi: x / Bacteria: x        12-26-23 @ 07:01  -  12-27-23 @ 07:00  --------------------------------------------------------  IN: 590 mL / OUT: 815 mL / NET: -225 mL    12-27-23 @ 07:01  -  12-27-23 @ 09:17  --------------------------------------------------------  IN: 50 mL / OUT: 0 mL / NET: 50 mL        12-26-23 @ 07:01  -  12-27-23 @ 07:00  --------------------------------------------------------  IN: 590 mL / OUT: 815 mL / NET: -225 mL    12-27-23 @ 07:01  -  12-27-23 @ 09:17  --------------------------------------------------------  IN: 50 mL / OUT: 0 mL / NET: 50 mL        RADIOLOGY:

## 2023-12-27 NOTE — DISCHARGE NOTE NURSING/CASE MANAGEMENT/SOCIAL WORK - PATIENT PORTAL LINK FT
You can access the FollowMyHealth Patient Portal offered by [facility] by registering at the following website: [email or web address]. By joining [employer]’s FollowMyHealth portal, you will also be able to view your health information using other applications (apps) compatible with our system. You can access the FollowMyHealth Patient Portal offered by [facility] by registering at the following website: [email or web address]. By joining [employer]’s FollowMyHealth portal, you will also be able to view your health information using other applications (apps) compatible with our system.

## 2023-12-27 NOTE — DISCHARGE NOTE PROVIDER - PROVIDER TOKENS
PROVIDER:[TOKEN:[[id number]]],PROVIDER:[TOKEN:[[id number]]] PROVIDER:[TOKEN:[[id number]]],PROVIDER:[TOKEN:[[id number]]]

## 2023-12-27 NOTE — DISCHARGE NOTE PROVIDER - NSDCFUADDAPPT_GEN_ALL_CORE_FT
please follow up with dr araujo on 1/03 at 11am    please follow up with dr ibrahim in one month    please follow up with cardiology as scheduled       please follow up with dr araujo on 1/03 at 11am    please follow up with dr ibarhim in one month    please follow up with cardiology as scheduled

## 2023-12-27 NOTE — PROGRESS NOTE ADULT - ASSESSMENT
EP: [facility]  Cardiology: Odilia    48-year-old woman with morbid obesity, severe claustrophobia moderate pulmonary hypertension, KANDICE on CPAP, AF s/p ablation 6 yrs ago at [facility] (stopped taking Xarelto), LE DVT, KANDICE c/w CPAP, Raynaud's syndrome, former smoker, paroxysmal atrial fibrillation and atrial aflutter 2:1, Tachy-adriana syndrome, pause 6.2 seconds, s/p Left bundle area pacing on 8/30/2023, multiple episodes of symptomatic ventricular tachycardia lasting 9-11 seconds associated with symptoms. Patient now presents for recommended upgrade to dual chamber ICD and extraction of pacemaker lead on 12/26/2023 ([employer]). No immediate postop complications noted.     Plan:  - Interrogation complete: AAI-DDD  bpm  - CXR noted  - HOLD any heparin, lovenox or DOACs for 48 hr following procedure to minimize risk of hematoma  - May resume Eliquis Friday AM  - Start Keflex 500 mg PO Q12 x 5 days, starting tonight   - May resume PO diet  - Resume home doses of diltiazem and metoprolol  - Tylenol for pain  - Ambulate as tolerated    Dispo: Home today     Discharge Instructions:  - No heavy lifting > 5 lbs. for 4-6 weeks  - Do not raise your arm above shoulder level for 4-6 weeks.   - Wound care per CT surgery  - No submerging in water for 1 month  - Leave steri-strips in place, they will fall off on their own  - No driving for 1 week   EP: [facility]  Cardiology: Odilia    48-year-old woman with morbid obesity, severe claustrophobia moderate pulmonary hypertension, KANDICE on CPAP, AF s/p ablation 6 yrs ago at [facility] (stopped taking Xarelto), LE DVT, KANDICE c/w CPAP, Raynaud's syndrome, former smoker, paroxysmal atrial fibrillation and atrial aflutter 2:1, Tachy-adriana syndrome, pause 6.2 seconds, s/p Left bundle area pacing on 8/30/2023, multiple episodes of symptomatic ventricular tachycardia lasting 9-11 seconds associated with symptoms. Patient now presents for recommended upgrade to dual chamber ICD and extraction of pacemaker lead on 12/26/2023 ([employer]). No immediate postop complications noted.     Plan:  - Interrogation complete: AAI-DDD  bpm  - CXR noted  - HOLD any heparin, lovenox or DOACs for 48 hr following procedure to minimize risk of hematoma  - May resume Eliquis Friday AM  - Start Keflex 500 mg PO Q12 x 5 days, starting tonight   - May resume PO diet  - Resume home doses of diltiazem and metoprolol  - Tylenol for pain  - Ambulate as tolerated    Dispo: Home today     Discharge Instructions:  - No heavy lifting > 5 lbs. for 4-6 weeks  - Do not raise your arm above shoulder level for 4-6 weeks.   - Wound care per CT surgery  - No submerging in water for 1 month  - Leave steri-strips in place, they will fall off on their own  - No driving for 1 week

## 2023-12-27 NOTE — DISCHARGE NOTE PROVIDER - NSDCMRMEDTOKEN_GEN_ALL_CORE_FT
apixaban 5 mg oral tablet: 1 tab(s) orally every 12 hours RESUME ON SATURDAY 12/30 in am  Bumex 2 mg oral tablet: 1 orally once a day  cephalexin 500 mg oral tablet: 1 tab(s) orally 2 times a day  dilTIAZem 240 mg/24 hours oral capsule, extended release: 1 cap(s) orally once a day  Lipitor 80 mg oral tablet: 1 orally once a day (at bedtime)  oxycodone-acetaminophen 5 mg-325 mg oral tablet: 1 tab(s) orally as needed for  moderate pain  polyethylene glycol 3350 oral powder for reconstitution: 17 gram(s) orally once a day  sildenafil 20 mg oral tablet: 1 orally 3 times a day  Toprol-XL 25 mg oral tablet, extended release: 1 tab(s) orally once a day

## 2023-12-27 NOTE — DISCHARGE NOTE PROVIDER - NSDCFUSCHEDAPPT_GEN_ALL_CORE_FT
Dominic Monique  [facility]  CTSURG [address]  Scheduled Appointment: 01/03/2024    Jorge L Doyle  [facility] PreAdmits  Scheduled Appointment: 01/04/2024    [facility]  BURNTREAT [address]  Scheduled Appointment: 01/04/2024    [facility]  ELECTROPH [address]  Scheduled Appointment: 01/25/2024    [facility]  CARDIOLOGY [address]  Scheduled Appointment: 03/18/2024     Dominic Monique  [facility]  CTSURG [address]  Scheduled Appointment: 01/03/2024    Jorge L Doyle  [facility] PreAdmits  Scheduled Appointment: 01/04/2024    [facility]  BURNTREAT [address]  Scheduled Appointment: 01/04/2024    [facility]  ELECTROPH [address]  Scheduled Appointment: 01/25/2024    [facility]  CARDIOLOGY [address]  Scheduled Appointment: 03/18/2024

## 2023-12-27 NOTE — DISCHARGE NOTE PROVIDER - NSDCFUADDINST_GEN_ALL_CORE_FT
please avoid any heavy lifting, pushing, or pulling anything > 10 lbs x 1 month; please no driving or sitting in the front seat or sleeping on side x 1 week; check temp and weight daily and shower daily  please adhere to left arm precautions to prevent lead fracture

## 2023-12-27 NOTE — DISCHARGE NOTE NURSING/CASE MANAGEMENT/SOCIAL WORK - NSDCPEFALRISK_GEN_ALL_CORE
For information on Fall & Injury Prevention, visit: [email or web address] OR  [email or web address] OR  https://www.cdc.gov/steadi/patient.html For information on Fall & Injury Prevention, visit: [email or web address] OR  [email or web address] OR  https://www.cdc.gov/steadi/patient.html

## 2023-12-27 NOTE — DISCHARGE NOTE PROVIDER - NSDCCPCAREPLAN_GEN_ALL_CORE_FT
PRINCIPAL DISCHARGE DIAGNOSIS  Diagnosis: History of ventricular tachycardia  Assessment and Plan of Treatment:

## 2023-12-27 NOTE — DISCHARGE NOTE PROVIDER - CARE PROVIDERS DIRECT ADDRESSES
,[email or web address]

## 2023-12-27 NOTE — DISCHARGE NOTE PROVIDER - HOSPITAL COURSE
48-year-old woman with morbid obesity, moderate pulmonary hypertension, KANDICE on CPAP, AF s/p ablation 6 yrs ago at [facility] (stopped taking Xarelto), LE DVT, KANDICE c/w CPAP, Raynaud's syndrome, former smoker, paroxysmal atrial fibrillation and atrial aflutter 2:1, Tachy-adriana syndrome. pause 6.2 seconds, s/p Left bundle area pacing on 8/30/2023, multiple episodes of symptomatic ventricular tachycardia lasting 9-11 seconds associated with symptoms.  The patient has been experiencing brief episodes of moderate to severe dizziness, palpitations, and presyncope. She denies chest pain/discomfort, dyspnea or any syncopal episodes. Patient is severely claustrophobic.  Patient now presents for recommended upgrade to dual chamber ICD and extraction of pacemaker lead.  Pt currently is without complaints and underwent the AICD upgrade on 12/26 and had an uneventful hospital course.  She was then discharged on POD # 1 after her device was successfully interrogated.    Pt was educated on the importance of attending cardiac rehab post op and was given materials re cardiac rehab program.  She and her daughter were instructed to inquire further upon seeing her cardiologist. 48-year-old woman with morbid obesity, moderate pulmonary hypertension, KANDICE on CPAP, AF s/p ablation 6 yrs ago at [facility] (stopped taking Xarelto), LE DVT, KANDICE c/w CPAP, Raynaud's syndrome, former smoker, paroxysmal atrial fibrillation and atrial aflutter 2:1, Tachy-adriana syndrome. pause 6.2 seconds, s/p Left bundle area pacing on 8/30/2023, multiple episodes of symptomatic ventricular tachycardia lasting 9-11 seconds associated with symptoms.  The patient has been experiencing brief episodes of moderate to severe dizziness, palpitations, and presyncope. She denies chest pain/discomfort, dyspnea or any syncopal episodes. Patient is severely claustrophobic.  Patient now presents for recommended upgrade to dual chamber ICD and extraction of pacemaker lead.  Pt currently is without complaints and underwent the AICD upgrade on 12/26 and had an uneventful hospital course.  She was then discharged on POD # 1 after her device was successfully interrogated.    Pt was educated on the importance of attending cardiac rehab post op and was given materials re cardiac rehab program.  She and her daughter were instructed to inquire further upon seeing her cardiologist.

## 2023-12-27 NOTE — PROGRESS NOTE ADULT - SUBJECTIVE AND OBJECTIVE BOX
OPERATIVE PROCEDURE(s):   aicd upgrade             POD # 1    SURGEON(s): van    SUBJECTIVE ASSESSMENT: no complaints    Vital Signs Last 24 Hrs  T(C): 36.4 (27 Dec 2023 12:00), Max: 36.7 (26 Dec 2023 16:00)  T(F): 97.6 (27 Dec 2023 12:00), Max: 98.1 (26 Dec 2023 16:00)  HR: 86 (27 Dec 2023 14:00) (72 - 112)  BP: 105/64 (27 Dec 2023 14:00) (105/64 - 136/64)  BP(mean): 78 (27 Dec 2023 14:00) (78 - 102)  RR: 19 (27 Dec 2023 14:00) (10 - 38)  SpO2: 98% (27 Dec 2023 14:00) (92% - 100%)    Parameters below as of 27 Dec 2023 14:00  Patient On (Oxygen Delivery Method): room air      12-26-23 @ 07:01  -  12-27-23 @ 07:00  --------------------------------------------------------  IN: 590 mL / OUT: 815 mL / NET: -225 mL    12-27-23 @ 07:01  -  12-27-23 @ 14:51  --------------------------------------------------------  IN: 300 mL / OUT: 200 mL / NET: 100 mL        Physical Exam  General: alert and oriented x 3  Chest: cta bl  CVS: s1s2  Abd: pos bs soft nt obese  GI/ :  Ext: mild le edema    LABS:                        10.7   7.95  )-----------( 208      ( 27 Dec 2023 03:26 )             34.8     COUMADIN:   [ ] YES [ x] NO    PT/INR - ( 26 Dec 2023 11:50 )   PT: 13.10 sec;   INR: 1.15 ratio         PTT - ( 26 Dec 2023 11:50 )  PTT:29.1 sec  12-27    142  |  105  |  16  ----------------------------<  114<H>  4.2   |  27  |  0.7    Ca    8.9      27 Dec 2023 03:26    TPro  6.8  /  Alb  3.6  /  TBili  0.6  /  DBili  x   /  AST  24  /  ALT  16  /  AlkPhos  121<H>  12-27    Urinalysis Basic - ( 27 Dec 2023 03:26 )    Color: x / Appearance: x / SG: x / pH: x  Gluc: 114 mg/dL / Ketone: x  / Bili: x / Urobili: x   Blood: x / Protein: x / Nitrite: x   Leuk Esterase: x / RBC: x / WBC x   Sq Epi: x / Non Sq Epi: x / Bacteria: x        MEDICATIONS  (STANDING):  diltiazem    milliGRAM(s) Oral daily  influenza   Vaccine 0.5 milliLiter(s) IntraMuscular once  metoprolol succinate ER 25 milliGRAM(s) Oral daily  polyethylene glycol 3350 17 Gram(s) Oral daily    MEDICATIONS  (PRN):  acetaminophen     Tablet .. 650 milliGRAM(s) Oral every 6 hours PRN Temp greater or equal to 38C (100.4F), Mild Pain (1 - 3)  oxyCODONE    IR 5 milliGRAM(s) Oral every 4 hours PRN Moderate Pain (4 - 6)      Allergies    No Known Allergies    Intolerances    Ambulation/Activity Status:  amb well with pt but is requesting walker for ambulation assistance      RADIOLOGY & ADDITIONAL TESTS:  ACC: [id number] EXAM:  XR CHEST PA LAT 2V   ORDERED BY: KAVITHA FORD     PROCEDURE DATE:  12/27/2023      INTERPRETATION:  STUDY INDICATION: Shortness of Breath    Comparison: 12/27/2023.    Technique/Positioning: Frontal and lateral views of the chest were   obtained. The patient's upper extremities on the lateral view limiting   evaluation.    Findings/  Impression:    Support devices: None.    Cardiac/mediastinum/hilum: Stable cardiomediastinal silhouette. Pacemaker   leads projecting over the right atrium and right ventricle.    Lung parenchyma/Pleura: No consolidation, effusion or pneumothorax.    Skeleton/soft tissues:  No radiographically evident acute displaced   fracture within the limitations of this exam.    --- End of Report ---    Assessment/Plan: Patient is a 49 yo female s/p laser lead extraction of ppm lead and upgrade to aicd pod # 1  cont present tx as per [facility] md  hold off on Eliquis until 12/30 am so pt is off of anti-coagulation for 72 hours post op  cont prophylactic abx for 5 days post op  d/c home today and pt will follow up with dr araujo next week for post op check up  walker for ambulation assistance  resume cardizem and toprol for heart rate control however dec dose of toprol due to low bp  follow up with dr ibrahim in one month device check                                         Social Service Disposition:     OPERATIVE PROCEDURE(s):   aicd upgrade             POD # 1    SURGEON(s): van    SUBJECTIVE ASSESSMENT: no complaints    Vital Signs Last 24 Hrs  T(C): 36.4 (27 Dec 2023 12:00), Max: 36.7 (26 Dec 2023 16:00)  T(F): 97.6 (27 Dec 2023 12:00), Max: 98.1 (26 Dec 2023 16:00)  HR: 86 (27 Dec 2023 14:00) (72 - 112)  BP: 105/64 (27 Dec 2023 14:00) (105/64 - 136/64)  BP(mean): 78 (27 Dec 2023 14:00) (78 - 102)  RR: 19 (27 Dec 2023 14:00) (10 - 38)  SpO2: 98% (27 Dec 2023 14:00) (92% - 100%)    Parameters below as of 27 Dec 2023 14:00  Patient On (Oxygen Delivery Method): room air      12-26-23 @ 07:01  -  12-27-23 @ 07:00  --------------------------------------------------------  IN: 590 mL / OUT: 815 mL / NET: -225 mL    12-27-23 @ 07:01  -  12-27-23 @ 14:51  --------------------------------------------------------  IN: 300 mL / OUT: 200 mL / NET: 100 mL        Physical Exam  General: alert and oriented x 3  Chest: cta bl  CVS: s1s2  Abd: pos bs soft nt obese  GI/ :  Ext: mild le edema    LABS:                        10.7   7.95  )-----------( 208      ( 27 Dec 2023 03:26 )             34.8     COUMADIN:   [ ] YES [ x] NO    PT/INR - ( 26 Dec 2023 11:50 )   PT: 13.10 sec;   INR: 1.15 ratio         PTT - ( 26 Dec 2023 11:50 )  PTT:29.1 sec  12-27    142  |  105  |  16  ----------------------------<  114<H>  4.2   |  27  |  0.7    Ca    8.9      27 Dec 2023 03:26    TPro  6.8  /  Alb  3.6  /  TBili  0.6  /  DBili  x   /  AST  24  /  ALT  16  /  AlkPhos  121<H>  12-27    Urinalysis Basic - ( 27 Dec 2023 03:26 )    Color: x / Appearance: x / SG: x / pH: x  Gluc: 114 mg/dL / Ketone: x  / Bili: x / Urobili: x   Blood: x / Protein: x / Nitrite: x   Leuk Esterase: x / RBC: x / WBC x   Sq Epi: x / Non Sq Epi: x / Bacteria: x        MEDICATIONS  (STANDING):  diltiazem    milliGRAM(s) Oral daily  influenza   Vaccine 0.5 milliLiter(s) IntraMuscular once  metoprolol succinate ER 25 milliGRAM(s) Oral daily  polyethylene glycol 3350 17 Gram(s) Oral daily    MEDICATIONS  (PRN):  acetaminophen     Tablet .. 650 milliGRAM(s) Oral every 6 hours PRN Temp greater or equal to 38C (100.4F), Mild Pain (1 - 3)  oxyCODONE    IR 5 milliGRAM(s) Oral every 4 hours PRN Moderate Pain (4 - 6)      Allergies    No Known Allergies    Intolerances    Ambulation/Activity Status:  amb well with pt but is requesting walker for ambulation assistance      RADIOLOGY & ADDITIONAL TESTS:  ACC: [id number] EXAM:  XR CHEST PA LAT 2V   ORDERED BY: KAVITHA FORD     PROCEDURE DATE:  12/27/2023      INTERPRETATION:  STUDY INDICATION: Shortness of Breath    Comparison: 12/27/2023.    Technique/Positioning: Frontal and lateral views of the chest were   obtained. The patient's upper extremities on the lateral view limiting   evaluation.    Findings/  Impression:    Support devices: None.    Cardiac/mediastinum/hilum: Stable cardiomediastinal silhouette. Pacemaker   leads projecting over the right atrium and right ventricle.    Lung parenchyma/Pleura: No consolidation, effusion or pneumothorax.    Skeleton/soft tissues:  No radiographically evident acute displaced   fracture within the limitations of this exam.    --- End of Report ---    Assessment/Plan: Patient is a 49 yo female s/p laser lead extraction of ppm lead and upgrade to aicd pod # 1  cont present tx as per [facility] md  hold off on Eliquis until 12/30 am so pt is off of anti-coagulation for 72 hours post op  cont prophylactic abx for 5 days post op  d/c home today and pt will follow up with dr araujo next week for post op check up  walker for ambulation assistance  resume cardizem and toprol for heart rate control however dec dose of toprol due to low bp  follow up with dr ibrahim in one month device check                                         Social Service Disposition:

## 2023-12-30 RX ORDER — APIXABAN 2.5 MG/1
1 TABLET, FILM COATED ORAL
Qty: 60 | Refills: 0 | DISCHARGE
Start: 2023-12-30 | End: 2024-01-28

## 2023-12-30 RX ORDER — APIXABAN 2.5 MG/1
1 TABLET, FILM COATED ORAL
Qty: 60 | Refills: 0
Start: 2023-12-30 | End: 2024-01-28

## 2024-01-04 ENCOUNTER — OUTPATIENT (OUTPATIENT)
Dept: OUTPATIENT SERVICES | Facility: HOSPITAL | Age: 49
LOS: 1 days | End: 2024-01-04
Payer: COMMERCIAL

## 2024-01-04 ENCOUNTER — APPOINTMENT (OUTPATIENT)
Dept: BURN CARE | Facility: CLINIC | Age: 49
End: 2024-01-04
Payer: COMMERCIAL

## 2024-01-04 ENCOUNTER — APPOINTMENT (OUTPATIENT)
Dept: CARDIOTHORACIC SURGERY | Facility: CLINIC | Age: 49
End: 2024-01-04
Payer: COMMERCIAL

## 2024-01-04 VITALS
RESPIRATION RATE: 12 BRPM | OXYGEN SATURATION: 92 % | TEMPERATURE: 98.1 F | HEIGHT: 66 IN | BODY MASS INDEX: 47.09 KG/M2 | SYSTOLIC BLOOD PRESSURE: 130 MMHG | HEART RATE: 56 BPM | DIASTOLIC BLOOD PRESSURE: 85 MMHG | WEIGHT: 293 LBS

## 2024-01-04 DIAGNOSIS — I47.20 VENTRICULAR TACHYCARDIA, UNSPECIFIED: ICD-10-CM

## 2024-01-04 DIAGNOSIS — Z87.891 PERSONAL HISTORY OF NICOTINE DEPENDENCE: ICD-10-CM

## 2024-01-04 DIAGNOSIS — E66.2 MORBID (SEVERE) OBESITY WITH ALVEOLAR HYPOVENTILATION: ICD-10-CM

## 2024-01-04 DIAGNOSIS — Z95.0 PRESENCE OF CARDIAC PACEMAKER: ICD-10-CM

## 2024-01-04 DIAGNOSIS — S81.802A UNSPECIFIED OPEN WOUND, LEFT LOWER LEG, INITIAL ENCOUNTER: ICD-10-CM

## 2024-01-04 DIAGNOSIS — Z00.8 ENCOUNTER FOR OTHER GENERAL EXAMINATION: ICD-10-CM

## 2024-01-04 DIAGNOSIS — Z98.890 OTHER SPECIFIED POSTPROCEDURAL STATES: Chronic | ICD-10-CM

## 2024-01-04 DIAGNOSIS — Z99.89 DEPENDENCE ON OTHER ENABLING MACHINES AND DEVICES: ICD-10-CM

## 2024-01-04 DIAGNOSIS — I27.20 PULMONARY HYPERTENSION, UNSPECIFIED: ICD-10-CM

## 2024-01-04 DIAGNOSIS — Z90.49 ACQUIRED ABSENCE OF OTHER SPECIFIED PARTS OF DIGESTIVE TRACT: Chronic | ICD-10-CM

## 2024-01-04 DIAGNOSIS — Z95.810 PRESENCE OF AUTOMATIC (IMPLANTABLE) CARDIAC DEFIBRILLATOR: ICD-10-CM

## 2024-01-04 DIAGNOSIS — I87.8 OTHER SPECIFIED DISORDERS OF VEINS: ICD-10-CM

## 2024-01-04 DIAGNOSIS — I44.7 LEFT BUNDLE-BRANCH BLOCK, UNSPECIFIED: ICD-10-CM

## 2024-01-04 DIAGNOSIS — I49.5 SICK SINUS SYNDROME: ICD-10-CM

## 2024-01-04 DIAGNOSIS — F40.240 CLAUSTROPHOBIA: ICD-10-CM

## 2024-01-04 DIAGNOSIS — I73.00 RAYNAUD'S SYNDROME WITHOUT GANGRENE: ICD-10-CM

## 2024-01-04 DIAGNOSIS — Z86.718 PERSONAL HISTORY OF OTHER VENOUS THROMBOSIS AND EMBOLISM: ICD-10-CM

## 2024-01-04 PROCEDURE — 87070 CULTURE OTHR SPECIMN AEROBIC: CPT

## 2024-01-04 PROCEDURE — 87077 CULTURE AEROBIC IDENTIFY: CPT

## 2024-01-04 PROCEDURE — 99024 POSTOP FOLLOW-UP VISIT: CPT

## 2024-01-04 PROCEDURE — 99213 OFFICE O/P EST LOW 20 MIN: CPT

## 2024-01-04 PROCEDURE — 87186 SC STD MICRODIL/AGAR DIL: CPT

## 2024-01-04 NOTE — ASSESSMENT
[FreeTextEntry1] : Mrs. Snow is a 48-year-old woman with morbid obesity, moderate pulmonary hypertension, KANDICE on CPAP, AF s/p ablation 6 yrs ago at [facility] (stopped taking Xarelto), LE DVT, KANDICE c/w CPAP, Raynaud's syndrome, former smoker, paroxysmal atrial fibrillation and atrial aflutter 2:1, Tachy-adriana syndrome. pause 6.2 seconds, s/p Left bundle area pacing on 8/30/2023, multiple episodes of symptomatic ventricular tachycardia lasting 9-11 seconds associated with symptoms. The patient has been experiencing brief episodes of moderate to severe dizziness, palpitations, and presyncope. She denies chest pain/discomfort, dyspnea or any syncopal episodes. Patient is severely claustrophobic. Patient now presents for recommended upgrade to dual chamber ICD and extraction of pacemaker lead.  Pt currently is without complaints and underwent the AICD upgrade on 12/26 and had an uneventful hospital course.  She was then discharged on POD # 1 after her device was successfully interrogated.  Plan: #S/P Implant Doing well Restrictions for 6 weeks F/U with EP Wash incisions with soap and water F/U with CTS prn

## 2024-01-04 NOTE — REASON FOR VISIT
[de-identified] : s/p Laser Lead Revision  [de-identified] : 12/26/23 [de-identified] : Mrs. Snow is a 47 y/o female that arrives today for a site check.

## 2024-01-04 NOTE — ASSESSMENT
[FreeTextEntry1] : The venous stasis ulcers  left leg  measures  [id number].  A wound culture was obtained. The left leg has  adherent devitalized tissue.  The patient was instructed to clean  the wound with soap and water. Wash the wound with hibiclens soap .  Continue local wound care with santyl .  Follow up 2 - 4  weeks.  Will schedule debridement and BTM. [Wound Care] : wound care

## 2024-01-04 NOTE — REASON FOR VISIT
[Revisit] : revisit [Were you seen in the Emergency Room?] : seen in the emergency room [Were you admitted to the burn center at [facility]?] : not admitted to the burn center at [facility] [Family Member] : family member
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
## 2024-01-04 NOTE — PHYSICAL EXAM
[] : no respiratory distress [Auscultation Breath Sounds / Voice Sounds] : lungs were clear to auscultation bilaterally [Heart Rate And Rhythm] : heart rate was normal and rhythm regular [Heart Sounds] : normal S1 and S2 [Heart Sounds Gallop] : no gallops [Murmurs] : no murmurs [Heart Sounds Pericardial Friction Rub] : no pericardial rub [Clean] : clean [Dry] : dry [Healing Well] : healing well [Pitting Edema] : pitting edema present [FreeTextEntry3] : Wound Left leg followed by Dr. Doyle

## 2024-01-04 NOTE — PHYSICAL EXAM
[Healing] : healing [Size%: ______] : Size: [unfilled]% [4] : 4 out of 10 [Abnormal] : abnormal [Large] : medium [] : no [de-identified] : cont current pain meds and consider pain management [de-identified] : The venous stasis ulcers  left leg  measures  [id number].  A wound culture was obtained. The left leg has  adherent devitalized tissue.  The patient was instructed to clean  the wound with soap and water. Wash the wound with hibiclens soap .  Continue local wound care with santyl .  Follow up 2 - 4  weeks.  Will schedule debridement and BTM. [TWNoteComboBox1] : ABD pad

## 2024-01-04 NOTE — COUNSELING
[Hygeine (Including Daily Shower)] : hygeine (including daily shower) [Importance of Regular Medical Follow-Up] : the importance of regular medical follow-up [No Heavy Lifting] : no heavy lifting (>15-20 lb. for 1 month or 25 lb. for 3 months from date of surgery) [Blood Pressure Control] : blood pressure control [S/S of infection] : signs and symptoms of infection (and to whom it should be reported) [Progressive Ambulation/Activity] : progressive ambulation/activity [Medication/Vitamin/Herb/Food Interaction] : medication/vitamin/herb/food interaction [FreeTextEntry1] : YANETH ALMARAZ is a 48 year F that arrives today s/p device implant. They were educated on site care. Incision site is to be washed daily with soap and water, pat dry. NO lotions, perfumes, or ointments to be applied to the incision site until completely healed, usual time frame is 3 weeks. On arrival patient denies fever, chills nausea, and vomiting. Denies SOB or palpitation. On initial implantation restrictions are 6 weeks of no lifting, pushing pulling anything 10lbs or greater. Also the patient is not allowed to lift their arm above heart level for 6 weeks. No driving for 4 weeks.Patient was instructed to follow up with their electrophysiologist as well as their cardiologist within the next 2-4 weeks.

## 2024-01-05 DIAGNOSIS — I83.029 VARICOSE VEINS OF LEFT LOWER EXTREMITY WITH ULCER OF UNSPECIFIED SITE: ICD-10-CM

## 2024-01-05 DIAGNOSIS — L97.929 NON-PRESSURE CHRONIC ULCER OF UNSPECIFIED PART OF LEFT LOWER LEG WITH UNSPECIFIED SEVERITY: ICD-10-CM

## 2024-01-10 LAB — BACTERIA SPEC CULT: ABNORMAL

## 2024-01-11 ENCOUNTER — APPOINTMENT (OUTPATIENT)
Dept: ELECTROPHYSIOLOGY | Facility: CLINIC | Age: 49
End: 2024-01-11
Payer: COMMERCIAL

## 2024-01-11 ENCOUNTER — APPOINTMENT (OUTPATIENT)
Dept: ELECTROPHYSIOLOGY | Facility: CLINIC | Age: 49
End: 2024-01-11

## 2024-01-11 VITALS
TEMPERATURE: 97.1 F | HEIGHT: 66 IN | WEIGHT: 293 LBS | HEART RATE: 88 BPM | SYSTOLIC BLOOD PRESSURE: 118 MMHG | DIASTOLIC BLOOD PRESSURE: 80 MMHG | RESPIRATION RATE: 18 BRPM | BODY MASS INDEX: 47.09 KG/M2

## 2024-01-11 PROCEDURE — 99214 OFFICE O/P EST MOD 30 MIN: CPT

## 2024-01-11 PROCEDURE — 93283 PRGRMG EVAL IMPLANTABLE DFB: CPT

## 2024-01-12 NOTE — DISCUSSION/SUMMARY
[FreeTextEntry1] : Ms. Kayla Snow is a pleasant 48-year-old woman with morbid obesity, moderate pulmonary hypertension, KANDICE on CPAP, AF s/p ablation 6 yrs ago at [facility] (stopped taking Xarelto), LE DVT, KANDICE c/w CPAP, Raynaud's syndrome, former smoker, paroxysmal atrial fibrillation and atrial aflutter 2:1, Tachy-adriana syndrome. pause 6.2 seconds, s/p Left bundle area pacing on 8/30/2023, multiple episodes of symptomatic ventricular tachycardia.   # NSVT Events: 9/2/2023; VT 2.5 sec at 214 bpm 11/24/2023 9 seconds VT at 200 bpm; at 8 pm associated with presyncope and moderate to severe dizziness. 12/17/2023; 11 seconds VT at 360 ms, 11 pm 12/18/2023: 11 seconds VT at 200 bpm  # Patient has multiple NSVT lasting 9-11 seconds associated with symptoms.  - Etiology of NSVT is unclear. No significant CAD and normal LV systolic function - Continue 200 mg per day. (Increased LOV from 150mg QD) - Unable to complete CMR due to claustrophobia, obesity and presence of PPM. - For the treatment of symptomatic Ventricular Tachycardia, pt was upgraded to a dual ICD (MDT) and extraction of old RV pacing lead on 12/26/2023 by Dr. Monique. - As per Dr. Frazier PET scan in 4 months (NOV).  # Paroxysmal AFib/AFlutter - AF [place] currently <0.1%  # KANDICE  - Compliant with CPAP  Of note: patient is going for L-leg synthetic graft on 02/09/2024 with Dr. Doyle.  I interrogated and reprogrammed her device as described in procedure. Her wound is healed properly, with no signs of inflammation, infection or bleeding. I discussed with patient plan of care in great details. I discussed remote monitoring with her, and need to call office if she does manual transmission, I answered all her questions to her satisfaction. Patient was pleased with the visit.    Patient will follow with me in 3-4 months' time or earlier if symptoms develop worsen. Please do not hesitate to contact me at [phone] if you have any further questions regarding this patient care.
Admission

## 2024-01-12 NOTE — PROCEDURE
[No] : not [NSR] : normal sinus rhythm [See Device Printout] : See device printout [Pacemaker] : pacemaker [DDD] : DDD [Longevity: ___ months] : The estimated remaining battery life is [unfilled] months [Normal] : The battery status is normal. [Threshold Testing Performed] : Threshold testing was performed [Lead Imp:  ___ohms] : lead impedance was [unfilled] ohms [Sensing Amplitude ___mv] : sensing amplitude was [unfilled] mv [___V @] : [unfilled] V [___ ms] : [unfilled] ms [Programmed for Longevity] : output reprogrammed for improved battery longevity [Counters Reset] : the counters were reset [de-identified] : 73 bpm [de-identified] : [employer] [de-identified] : Sara MARRERO DR MRI [id number] [de-identified] : [id number] [de-identified] : 08/30/2023 [de-identified] :  [de-identified] : Turned on Alerts.  [de-identified] : Multiple NSVT episodes see above. Transmitting on iTwin.

## 2024-01-12 NOTE — REASON FOR VISIT
[Arrhythmia/ECG Abnorrmalities] : arrhythmia/ECG abnormalities [Other: ____] : [unfilled] [FreeTextEntry3] : Dr. Rashaun Hartley - Dr. Dominic Villalobos

## 2024-01-12 NOTE — HISTORY OF PRESENT ILLNESS
[FreeTextEntry1] : EP: Dr. Frazier Cardio: Dr. Hartley  AF s/p ablation 6 yrs ago at [facility] (stopped taking Xarelto), LE DVT, KANDICE c/w CPAP, Raynaud's former smoker, with recent hospitalization from 6/7-6/8 for palpitations and SOB, found to be in aflutter 2:1. Cardizem with minimal response. Set up for TOLU/CV but self-converted prior to procedure. Recently had an outpatient ECHO a month ago which showed evidence of moderate pulmonary hypertension.  Of note, pt also has a chronic left posterior calf wound for which she sees Dr. Doyle. She noticed increased drainage recently similar to prior infections and has been on multiple antibiotics for it.   8/21/2023: Presents as hospital follow up. Feels daily palpitations. She is interested in ablation. Has KANDICE and is c/w CPAP. Denies chest pain or syncope. Had recent cath with minimal CAD.  8/28/2023: 7h20 am 6.2 sec pause. intermittent complete AV block with HR < 20 bpm.  8/30/2023: dual PPM placement - Left bundle area pacing 9/2/2023; VT 2.5 sec at 214 bpm 11/24/2023 9 seconds VT at 200 bpm; at 8 pm associated with presyncope and moderate to severe dizziness. 12/17/2023; 11 seconds VT at 360 ms, 11 pm 12/18/2023: 11 seconds VT at 200 bpm  12/18/2023: The patient has been experiencing brief episodes of moderate to severe dizziness, palpitations, and presyncope.   01/11/2024: Patient denies chest pain/discomfort, dyspnea or any syncopal episodes. Patient is severely claustrophobic.

## 2024-01-12 NOTE — PHYSICAL EXAM
[Well Developed] : well developed [Well Nourished] : well nourished [No Acute Distress] : no acute distress [Obese] : obese [Normal Conjunctiva] : normal conjunctiva [Normal Venous Pressure] : normal venous pressure [Normal S1, S2] : normal S1, S2 [No Murmur] : no murmur [Clear Lung Fields] : clear lung fields [Good Air Entry] : good air entry [No Respiratory Distress] : no respiratory distress  [Soft] : abdomen soft [Normal Gait] : normal gait [No Edema] : no edema [No Rash] : no rash [Moves all extremities] : moves all extremities [Normal Speech] : normal speech [Alert and Oriented] : alert and oriented [Normal memory] : normal memory [Left Infraclavicular] : left infraclavicular area [Clean] : clean [Dry] : dry [Well-Healed] : well-healed [General Appearance - Well Developed] : well developed [Normal Appearance] : normal appearance [Well Groomed] : well groomed [General Appearance - Well Nourished] : well nourished [No Deformities] : no deformities [General Appearance - In No Acute Distress] : no acute distress [] : no respiratory distress [de-identified] : tachycardic

## 2024-01-12 NOTE — CARDIOLOGY SUMMARY
[de-identified] : 12/18/2023: Sinus tachycardia at 103 bpm with occasional PVCs, IRBBB 8/282023: sinus tachycardia at 109 bpm; 8/21/2023 tach ( bpm) (AFl with variable block?) [de-identified] : TTE 05/06/2023: LVEF 55-60%, ascending aorta 3.7 cm across, moderate TR/ pulmonary HTN. [de-identified] : 12/26/2023: upgraded to a dual ICD (MDT) and extraction of old RV pacing lead (by Dr. Monique) 08/30/2023: DC-PPM placement - Left bundle area pacing [de-identified] : Cath 7/11/2023 Mild CAD. Mod pulm HTN. Likely combination of HFpEF, obesity hypoventilation syndrome and Raynaud.

## 2024-01-25 ENCOUNTER — APPOINTMENT (OUTPATIENT)
Dept: ELECTROPHYSIOLOGY | Facility: CLINIC | Age: 49
End: 2024-01-25

## 2024-01-26 ENCOUNTER — OUTPATIENT (OUTPATIENT)
Dept: OUTPATIENT SERVICES | Facility: HOSPITAL | Age: 49
LOS: 1 days | End: 2024-01-26
Payer: COMMERCIAL

## 2024-01-26 VITALS
RESPIRATION RATE: 16 BRPM | HEART RATE: 97 BPM | OXYGEN SATURATION: 96 % | HEIGHT: 66 IN | DIASTOLIC BLOOD PRESSURE: 71 MMHG | TEMPERATURE: 98 F | SYSTOLIC BLOOD PRESSURE: 116 MMHG | WEIGHT: 293 LBS

## 2024-01-26 DIAGNOSIS — Z95.810 PRESENCE OF AUTOMATIC (IMPLANTABLE) CARDIAC DEFIBRILLATOR: Chronic | ICD-10-CM

## 2024-01-26 DIAGNOSIS — Z01.818 ENCOUNTER FOR OTHER PREPROCEDURAL EXAMINATION: ICD-10-CM

## 2024-01-26 DIAGNOSIS — Z98.890 OTHER SPECIFIED POSTPROCEDURAL STATES: Chronic | ICD-10-CM

## 2024-01-26 DIAGNOSIS — S81.802A UNSPECIFIED OPEN WOUND, LEFT LOWER LEG, INITIAL ENCOUNTER: ICD-10-CM

## 2024-01-26 DIAGNOSIS — Z90.49 ACQUIRED ABSENCE OF OTHER SPECIFIED PARTS OF DIGESTIVE TRACT: Chronic | ICD-10-CM

## 2024-01-26 DIAGNOSIS — S81.802D UNSPECIFIED OPEN WOUND, LEFT LOWER LEG, SUBSEQUENT ENCOUNTER: ICD-10-CM

## 2024-01-26 LAB
A1C WITH ESTIMATED AVERAGE GLUCOSE RESULT: 6.3 % — HIGH (ref 4–5.6)
ALBUMIN SERPL ELPH-MCNC: 3.9 G/DL — SIGNIFICANT CHANGE UP (ref 3.5–5.2)
ALP SERPL-CCNC: 137 U/L — HIGH (ref 30–115)
ALT FLD-CCNC: 21 U/L — SIGNIFICANT CHANGE UP (ref 0–41)
ANION GAP SERPL CALC-SCNC: 12 MMOL/L — SIGNIFICANT CHANGE UP (ref 7–14)
APTT BLD: 33.3 SEC — SIGNIFICANT CHANGE UP (ref 27–39.2)
AST SERPL-CCNC: 30 U/L — SIGNIFICANT CHANGE UP (ref 0–41)
BASOPHILS # BLD AUTO: 0.03 K/UL — SIGNIFICANT CHANGE UP (ref 0–0.2)
BASOPHILS NFR BLD AUTO: 0.4 % — SIGNIFICANT CHANGE UP (ref 0–1)
BILIRUB SERPL-MCNC: 0.9 MG/DL — SIGNIFICANT CHANGE UP (ref 0.2–1.2)
BLD GP AB SCN SERPL QL: SIGNIFICANT CHANGE UP
BUN SERPL-MCNC: 16 MG/DL — SIGNIFICANT CHANGE UP (ref 10–20)
CALCIUM SERPL-MCNC: 9.2 MG/DL — SIGNIFICANT CHANGE UP (ref 8.4–10.5)
CHLORIDE SERPL-SCNC: 104 MMOL/L — SIGNIFICANT CHANGE UP (ref 98–110)
CO2 SERPL-SCNC: 24 MMOL/L — SIGNIFICANT CHANGE UP (ref 17–32)
CREAT SERPL-MCNC: 0.6 MG/DL — LOW (ref 0.7–1.5)
EGFR: 109 ML/MIN/1.73M2 — SIGNIFICANT CHANGE UP
EOSINOPHIL # BLD AUTO: 0.22 K/UL — SIGNIFICANT CHANGE UP (ref 0–0.7)
EOSINOPHIL NFR BLD AUTO: 2.9 % — SIGNIFICANT CHANGE UP (ref 0–8)
ESTIMATED AVERAGE GLUCOSE: 134 MG/DL — HIGH (ref 68–114)
GLUCOSE SERPL-MCNC: 99 MG/DL — SIGNIFICANT CHANGE UP (ref 70–99)
HCT VFR BLD CALC: 36 % — LOW (ref 37–47)
HGB BLD-MCNC: 11.1 G/DL — LOW (ref 12–16)
IMM GRANULOCYTES NFR BLD AUTO: 0.3 % — SIGNIFICANT CHANGE UP (ref 0.1–0.3)
INR BLD: 1.48 RATIO — HIGH (ref 0.65–1.3)
LYMPHOCYTES # BLD AUTO: 0.73 K/UL — LOW (ref 1.2–3.4)
LYMPHOCYTES # BLD AUTO: 9.7 % — LOW (ref 20.5–51.1)
MCHC RBC-ENTMCNC: 28.5 PG — SIGNIFICANT CHANGE UP (ref 27–31)
MCHC RBC-ENTMCNC: 30.8 G/DL — LOW (ref 32–37)
MCV RBC AUTO: 92.5 FL — SIGNIFICANT CHANGE UP (ref 81–99)
MONOCYTES # BLD AUTO: 1.07 K/UL — HIGH (ref 0.1–0.6)
MONOCYTES NFR BLD AUTO: 14.2 % — HIGH (ref 1.7–9.3)
NEUTROPHILS # BLD AUTO: 5.46 K/UL — SIGNIFICANT CHANGE UP (ref 1.4–6.5)
NEUTROPHILS NFR BLD AUTO: 72.5 % — SIGNIFICANT CHANGE UP (ref 42.2–75.2)
NRBC # BLD: 0 /100 WBCS — SIGNIFICANT CHANGE UP (ref 0–0)
PLATELET # BLD AUTO: 207 K/UL — SIGNIFICANT CHANGE UP (ref 130–400)
PMV BLD: 9.8 FL — SIGNIFICANT CHANGE UP (ref 7.4–10.4)
POTASSIUM SERPL-MCNC: 4.3 MMOL/L — SIGNIFICANT CHANGE UP (ref 3.5–5)
POTASSIUM SERPL-SCNC: 4.3 MMOL/L — SIGNIFICANT CHANGE UP (ref 3.5–5)
PROT SERPL-MCNC: 7.1 G/DL — SIGNIFICANT CHANGE UP (ref 6–8)
PROTHROM AB SERPL-ACNC: 16.9 SEC — HIGH (ref 9.95–12.87)
RBC # BLD: 3.89 M/UL — LOW (ref 4.2–5.4)
RBC # FLD: 18.1 % — HIGH (ref 11.5–14.5)
SODIUM SERPL-SCNC: 140 MMOL/L — SIGNIFICANT CHANGE UP (ref 135–146)
WBC # BLD: 7.53 K/UL — SIGNIFICANT CHANGE UP (ref 4.8–10.8)
WBC # FLD AUTO: 7.53 K/UL — SIGNIFICANT CHANGE UP (ref 4.8–10.8)

## 2024-01-26 PROCEDURE — 83036 HEMOGLOBIN GLYCOSYLATED A1C: CPT

## 2024-01-26 PROCEDURE — 86850 RBC ANTIBODY SCREEN: CPT

## 2024-01-26 PROCEDURE — 93010 ELECTROCARDIOGRAM REPORT: CPT

## 2024-01-26 PROCEDURE — 85610 PROTHROMBIN TIME: CPT

## 2024-01-26 PROCEDURE — 86900 BLOOD TYPING SEROLOGIC ABO: CPT

## 2024-01-26 PROCEDURE — 93005 ELECTROCARDIOGRAM TRACING: CPT

## 2024-01-26 PROCEDURE — 85730 THROMBOPLASTIN TIME PARTIAL: CPT

## 2024-01-26 PROCEDURE — 86901 BLOOD TYPING SEROLOGIC RH(D): CPT

## 2024-01-26 PROCEDURE — 85025 COMPLETE CBC W/AUTO DIFF WBC: CPT

## 2024-01-26 PROCEDURE — 36415 COLL VENOUS BLD VENIPUNCTURE: CPT

## 2024-01-26 PROCEDURE — 99214 OFFICE O/P EST MOD 30 MIN: CPT | Mod: 25

## 2024-01-26 PROCEDURE — 80053 COMPREHEN METABOLIC PANEL: CPT

## 2024-01-26 NOTE — H&P PST ADULT - MUSCULOSKELETAL
LLE/decreased ROM/decreased ROM due to pain/strength 5/5 bilateral upper extremities negative details…

## 2024-01-26 NOTE — H&P PST ADULT - RESPIRATORY
Daily Note     Today's date: 2021  Patient name: Belia Muhammad  : 1982  MRN: [id number]  Referring provider: Fawad Rivers MD  Dx:   Encounter Diagnosis     ICD-10-CM    1  Lumbar radiculopathy  M54 16                   Subjective: No changes  Objective: See treatment diary below    Assessment: Pt able to perform increased reps of lumbar mobility  Radicular symptoms persist  Tolerated treatment well  Patient demonstrated fatigue post treatment, exhibited good technique with therapeutic exercises and would benefit from continued PT    Plan: Continue per plan of care  Progress treatment as tolerated         Precautions: Lumbar surgery     Manuals                                                                Neuro Re-Ed                                                                                                        Ther Ex             laps, ss 5x ea 5x ea           SKTC, DKTC 10x10" 10x10"           Standing Ext 20x  30x           Seated Turtle Flexion  10x10s C  20x10s C           HS/Piriformis 3x30s ea 3x30s           3 way Hip 30x ea 30x           Ball Press Downs 20x ea 30x           Board Press Downs 30x 30x           SL Squats             UE DB's 4 way 30x 30x            Side Stepping  10x            Biking  5'                                     Ther Activity                                       Gait Training                                       Modalities normal/clear to auscultation bilaterally/no wheezes/no rales/no rhonchi

## 2024-01-26 NOTE — H&P PST ADULT - HISTORY OF PRESENT ILLNESS
Patient is a 49 yo female scheduled for the above surgery due to a stasis ulcer on left lower leg x 2 years.   Patient denies any cp, sob, palpitations, fever, cough, URI, abdominal pains, N/V, or  UTI x 2 wks. Patient has left leg chronic wound with c/o burning pains scale 7/10 and partial reliefs with pain meds.   As per patient exercise tolerance of 1 fos walks with out sob  Patient had COVID 2 months ago   Patient denies any s/s covid 19 and reports no contact with known positive people. Patient instructed to continue to self monitor and report any concerns to MD. Pt will continue to practice self isolation and  exposure control measures pre op  Anesthesia Alert  NO--Difficult Airway  NO--History of neck surgery or radiation  NO--Limited ROM of neck  NO--History of Malignant hyperthermia  NO--Personal or family history of Pseudocholinesterase deficiency  NO--Prior Anesthesia Complication  NO--Latex Allergy  NO--Loose teeth  NO--History of Rheumatoid Arthritis  YES --KANDICE. CPAP in use advised to bring it on the DOS  Yes Eliquis Risk of Bleedings  Pt instructed to stop vitamins/supplements/herbal medications for one week prior to surgery   As per patient this is the complete medical, surgical history and medications.  Duke Activity Status Index (DASI) from [employer]  on 1/26/2024  ** All calculations should be rechecked by clinician prior to use **  RESULT SUMMARY:  24.2 points  The higher the score (maximum 58.2), the higher the functional status.  5.72 METs  INPUTS:  Take care of self —> 2.75 = Yes  Walk indoors —> 1.75 = Yes  Walk 1&ndash;2 blocks on level ground —> 2.75 = Yes  Climb a flight of stairs or walk up a hill —> 5.5 = Yes  Run a short distance —> 0 = No  Do light work around the house —> 2.7 = Yes  Do moderate work around the house —> 3.5 = Yes  Do heavy work around the house —> 0 = No  Do yardwork —> 0 = No  Have sexual relations —> 5.25 = Yes  Participate in moderate recreational activities —> 0 = No  Participate in strenuous sports —> 0 = No  Revised Cardiac Risk Index for Pre-Operative Risk from [employer]  on 1/26/2024  ** All calculations should be rechecked by clinician prior to use **    RESULT SUMMARY:  1 points  Class II Risk  6.0 %  30-day risk of death, MI, or cardiac arrest  From Duceppe 2017. These numbers are higher than those from the original study (Charlie 1999). See Evidence for details.  INPUTS:  Elevated-risk surgery —> 0 = No  History of ischemic heart disease —> 0 = No  History of congestive heart failure —> 1 = Yes  History of cerebrovascular disease —> 0 = No  Pre-operative treatment with insulin —> 0 = No  Pre-operative creatinine >2 mg/dL / 176.8 µmol/L —> 0 = No  Opioid Risk Assessment Tool (Female)       Family history of substance abuse            Alcohol (1)            Illegal Drugs (2)            Prescription drugs (4)       Personal history of substance abuse            Alcohol (3)            Illegal Drugs (4)            Prescription drugs (5)       Age between 16-45 (1)       History of preadolescent sexual abuse (3)       Psychological disease (ADD, ADHD, OCD, Bipolar Disorder, Schizophrenia, Depression) (2)    Scoring Totals:  Low Risk (0-3)  Moderate Risk (4-7)  High Risk (>/=8)         Patient is a 49 yo female scheduled for the above surgery due to a stasis ulcer on left lower leg x 2 years.   Patient denies any cp, sob, palpitations, fever, cough, URI, abdominal pains, N/V, or  UTI x 2 wks. Patient has left leg chronic wound with c/o burning pains scale 7/10 and partial reliefs with pain meds.   As per patient exercise tolerance of 1 fos walks with out sob  Patient had COVID 2 months ago   Patient denies any s/s covid 19 and reports no contact with known positive people. Patient instructed to continue to self monitor and report any concerns to MD. Pt will continue to practice self isolation and  exposure control measures pre op  Anesthesia Alert  NO--Difficult Airway  NO--History of neck surgery or radiation  NO--Limited ROM of neck  NO--History of Malignant hyperthermia  NO--Personal or family history of Pseudocholinesterase deficiency  NO--Prior Anesthesia Complication  NO--Latex Allergy  NO--Loose teeth  NO--History of Rheumatoid Arthritis  YES --KANDICE. CPAP in use advised to bring it on the DOS  Yes Eliquis Risk of Bleedings  Pt instructed to stop vitamins/supplements/herbal medications for one week prior to surgery   As per patient this is the complete medical, surgical history and medications.  Duke Activity Status Index (DASI) from [employer]  on 1/26/2024  ** All calculations should be rechecked by clinician prior to use **  RESULT SUMMARY:  24.2 points  The higher the score (maximum 58.2), the higher the functional status.  5.72 METs  INPUTS:  Take care of self —> 2.75 = Yes  Walk indoors —> 1.75 = Yes  Walk 1&ndash;2 blocks on level ground —> 2.75 = Yes  Climb a flight of stairs or walk up a hill —> 5.5 = Yes  Run a short distance —> 0 = No  Do light work around the house —> 2.7 = Yes  Do moderate work around the house —> 3.5 = Yes  Do heavy work around the house —> 0 = No  Do yard work —> 0 = No  Have sexual relations —> 5.25 = Yes  Participate in moderate recreational activities —> 0 = No  Participate in strenuous sports —> 0 = No  Revised Cardiac Risk Index for Pre-Operative Risk from [employer]  on 1/26/2024  ** All calculations should be rechecked by clinician prior to use **    RESULT SUMMARY:  1 points  Class II Risk  6.0 %  30-day risk of death, MI, or cardiac arrest  From Duceppe 2017. These numbers are higher than those from the original study (Charlie 1999). See Evidence for details.  INPUTS:  Elevated-risk surgery —> 0 = No  History of ischemic heart disease —> 0 = No  History of congestive heart failure —> 1 = Yes  History of cerebrovascular disease —> 0 = No  Pre-operative treatment with insulin —> 0 = No  Pre-operative creatinine >2 mg/dL / 176.8 µmol/L —> 0 = No  Opioid Risk Assessment Tool (Female)       Family history of substance abuse            Alcohol (1)            Illegal Drugs (2)            Prescription drugs (4)       Personal history of substance abuse            Alcohol (3)            Illegal Drugs (4)            Prescription drugs (5)       Age between 16-45 (1)       History of preadolescent sexual abuse (3)       Psychological disease (ADD, ADHD, OCD, Bipolar Disorder, Schizophrenia, Depression) (2)    Scoring Totals:  Low Risk (0-3)  Moderate Risk (4-7)  High Risk (>/=8)

## 2024-01-26 NOTE — H&P PST ADULT - NSICDXPASTSURGICALHX_GEN_ALL_CORE_FT
PAST SURGICAL HISTORY:  AICD (automatic cardioverter/defibrillator) present     H/O prior ablation treatment     History of laparoscopic cholecystectomy

## 2024-01-26 NOTE — H&P PST ADULT - REASON FOR ADMISSION
Case Type: OP  Suite: [facility]  Proceduralist: Jorge L Doyle  Confirmed Surgery Date Time: 02-   PAST Date Time: 01- - 10:00  Procedure: DEBRIDEMENT AND PLACEMENT OF NOVOSORB BTM OF LEFT LOWER LEG  Laterality: Left  Length of Procedure: 60 Minutes  Anesthesia Type: General

## 2024-01-27 DIAGNOSIS — S81.802D UNSPECIFIED OPEN WOUND, LEFT LOWER LEG, SUBSEQUENT ENCOUNTER: ICD-10-CM

## 2024-01-27 DIAGNOSIS — Z01.818 ENCOUNTER FOR OTHER PREPROCEDURAL EXAMINATION: ICD-10-CM

## 2024-02-01 ENCOUNTER — APPOINTMENT (OUTPATIENT)
Dept: BURN CARE | Facility: CLINIC | Age: 49
End: 2024-02-01
Payer: COMMERCIAL

## 2024-02-01 ENCOUNTER — OUTPATIENT (OUTPATIENT)
Dept: OUTPATIENT SERVICES | Facility: HOSPITAL | Age: 49
LOS: 1 days | End: 2024-02-01
Payer: COMMERCIAL

## 2024-02-01 VITALS — TEMPERATURE: 97.9 F

## 2024-02-01 DIAGNOSIS — Z95.810 PRESENCE OF AUTOMATIC (IMPLANTABLE) CARDIAC DEFIBRILLATOR: Chronic | ICD-10-CM

## 2024-02-01 DIAGNOSIS — Z00.8 ENCOUNTER FOR OTHER GENERAL EXAMINATION: ICD-10-CM

## 2024-02-01 DIAGNOSIS — Z98.890 OTHER SPECIFIED POSTPROCEDURAL STATES: Chronic | ICD-10-CM

## 2024-02-01 DIAGNOSIS — Z90.49 ACQUIRED ABSENCE OF OTHER SPECIFIED PARTS OF DIGESTIVE TRACT: Chronic | ICD-10-CM

## 2024-02-01 PROBLEM — I89.0 LYMPHEDEMA, NOT ELSEWHERE CLASSIFIED: Chronic | Status: ACTIVE | Noted: 2024-01-26

## 2024-02-01 PROCEDURE — 87186 SC STD MICRODIL/AGAR DIL: CPT

## 2024-02-01 PROCEDURE — 99213 OFFICE O/P EST LOW 20 MIN: CPT

## 2024-02-01 PROCEDURE — 87070 CULTURE OTHR SPECIMN AEROBIC: CPT

## 2024-02-01 PROCEDURE — 87077 CULTURE AEROBIC IDENTIFY: CPT
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
## 2024-02-03 NOTE — PHYSICAL EXAM
[Healing] : healing [Size%: ______] : Size: [unfilled]% [4] : 4 out of 10 [Abnormal] : abnormal [Large] : medium [] : no [de-identified] : cont current pain meds and consider pain management [de-identified] : The venous stasis ulcers  left leg  measures  [id number].  A wound culture was obtained. The left leg has  adherent devitalized tissue.  The patient was instructed to clean  the wound with soap and water. Wash the wound with hibiclens soap .  Continue local wound care with santyl .  Follow up 2 - 4  weeks.  Will schedule debridement and BTM. [TWNoteComboBox1] : ABD pad

## 2024-02-03 NOTE — ASSESSMENT
[FreeTextEntry1] : The venous stasis ulcers  left leg  measures  [id number].  A wound culture was obtained. The left leg has  adherent devitalized tissue.  The patient was instructed to clean  the wound with soap and water. Wash the wound with hibiclens soap .  Continue local wound care with santyl .  Follow up 2 - 4  weeks.  Will schedule debridement and BTM. [Wound Care] : wound care

## 2024-02-04 DIAGNOSIS — I83.029 VARICOSE VEINS OF LEFT LOWER EXTREMITY WITH ULCER OF UNSPECIFIED SITE: ICD-10-CM

## 2024-02-04 DIAGNOSIS — L97.929 NON-PRESSURE CHRONIC ULCER OF UNSPECIFIED PART OF LEFT LOWER LEG WITH UNSPECIFIED SEVERITY: ICD-10-CM

## 2024-02-05 ENCOUNTER — APPOINTMENT (OUTPATIENT)
Dept: PAIN MANAGEMENT | Facility: CLINIC | Age: 49
End: 2024-02-05

## 2024-02-05 LAB — BACTERIA SPEC CULT: ABNORMAL

## 2024-02-08 NOTE — ASU PATIENT PROFILE, ADULT - AS SC BRADEN SENSORY
[employer]. [address] [place] [id number]. All rights reserved. This information is not intended as a substitute for professional medical care. Always follow your healthcare professional's instructions.            (4) no impairment

## 2024-02-08 NOTE — ASU PATIENT PROFILE, ADULT - NSICDXPASTMEDICALHX_GEN_ALL_CORE_FT
PAST MEDICAL HISTORY:  Afib     Back pain     DVT, lower extremity     H/O Raynaud's syndrome     H/O ventricular tachycardia     HTN (hypertension)     Lymphedema of leg     Morbidly obese     KANDICE on CPAP

## 2024-02-09 ENCOUNTER — INPATIENT (INPATIENT)
Facility: HOSPITAL | Age: 49
LOS: 5 days | Discharge: HOME HEALTH | DRG: 264 | End: 2024-02-15
Attending: PLASTIC SURGERY | Admitting: PLASTIC SURGERY
Payer: COMMERCIAL

## 2024-02-09 ENCOUNTER — RESULT REVIEW (OUTPATIENT)
Age: 49
End: 2024-02-09

## 2024-02-09 VITALS
OXYGEN SATURATION: 98 % | HEART RATE: 102 BPM | RESPIRATION RATE: 18 BRPM | TEMPERATURE: 98 F | SYSTOLIC BLOOD PRESSURE: 118 MMHG | WEIGHT: 293 LBS | DIASTOLIC BLOOD PRESSURE: 82 MMHG | HEIGHT: 66 IN

## 2024-02-09 DIAGNOSIS — Z98.890 OTHER SPECIFIED POSTPROCEDURAL STATES: Chronic | ICD-10-CM

## 2024-02-09 DIAGNOSIS — I83.009 VARICOSE VEINS OF UNSPECIFIED LOWER EXTREMITY WITH ULCER OF UNSPECIFIED SITE: ICD-10-CM

## 2024-02-09 DIAGNOSIS — Z90.49 ACQUIRED ABSENCE OF OTHER SPECIFIED PARTS OF DIGESTIVE TRACT: Chronic | ICD-10-CM

## 2024-02-09 DIAGNOSIS — Z95.810 PRESENCE OF AUTOMATIC (IMPLANTABLE) CARDIAC DEFIBRILLATOR: Chronic | ICD-10-CM

## 2024-02-09 DIAGNOSIS — S81.802A UNSPECIFIED OPEN WOUND, LEFT LOWER LEG, INITIAL ENCOUNTER: ICD-10-CM

## 2024-02-09 PROCEDURE — 84100 ASSAY OF PHOSPHORUS: CPT

## 2024-02-09 PROCEDURE — 93005 ELECTROCARDIOGRAM TRACING: CPT

## 2024-02-09 PROCEDURE — 86900 BLOOD TYPING SEROLOGIC ABO: CPT

## 2024-02-09 PROCEDURE — 87075 CULTR BACTERIA EXCEPT BLOOD: CPT

## 2024-02-09 PROCEDURE — 11046 DBRDMT MUSC&/FSCA EA ADDL: CPT

## 2024-02-09 PROCEDURE — 87077 CULTURE AEROBIC IDENTIFY: CPT

## 2024-02-09 PROCEDURE — 11043 DBRDMT MUSC&/FSCA 1ST 20/<: CPT

## 2024-02-09 PROCEDURE — 88304 TISSUE EXAM BY PATHOLOGIST: CPT | Mod: 26

## 2024-02-09 PROCEDURE — 83735 ASSAY OF MAGNESIUM: CPT

## 2024-02-09 PROCEDURE — 36415 COLL VENOUS BLD VENIPUNCTURE: CPT

## 2024-02-09 PROCEDURE — 87206 SMEAR FLUORESCENT/ACID STAI: CPT

## 2024-02-09 PROCEDURE — A2006: CPT

## 2024-02-09 PROCEDURE — C9399: CPT

## 2024-02-09 PROCEDURE — 84703 CHORIONIC GONADOTROPIN ASSAY: CPT

## 2024-02-09 PROCEDURE — 87186 SC STD MICRODIL/AGAR DIL: CPT

## 2024-02-09 PROCEDURE — 71045 X-RAY EXAM CHEST 1 VIEW: CPT

## 2024-02-09 PROCEDURE — 87102 FUNGUS ISOLATION CULTURE: CPT

## 2024-02-09 PROCEDURE — 80048 BASIC METABOLIC PNL TOTAL CA: CPT

## 2024-02-09 PROCEDURE — 85025 COMPLETE CBC W/AUTO DIFF WBC: CPT

## 2024-02-09 PROCEDURE — 86850 RBC ANTIBODY SCREEN: CPT

## 2024-02-09 PROCEDURE — 87116 MYCOBACTERIA CULTURE: CPT

## 2024-02-09 PROCEDURE — 86901 BLOOD TYPING SEROLOGIC RH(D): CPT

## 2024-02-09 PROCEDURE — 87070 CULTURE OTHR SPECIMN AEROBIC: CPT

## 2024-02-09 RX ORDER — MORPHINE SULFATE 50 MG/1
2 CAPSULE, EXTENDED RELEASE ORAL
Refills: 0 | Status: DISCONTINUED | OUTPATIENT
Start: 2024-02-09 | End: 2024-02-09

## 2024-02-09 RX ORDER — APIXABAN 2.5 MG/1
5 TABLET, FILM COATED ORAL
Refills: 0 | Status: DISCONTINUED | OUTPATIENT
Start: 2024-02-09 | End: 2024-02-11

## 2024-02-09 RX ORDER — BUMETANIDE 0.25 MG/ML
1 INJECTION INTRAMUSCULAR; INTRAVENOUS
Refills: 0 | DISCHARGE

## 2024-02-09 RX ORDER — HYDROMORPHONE HYDROCHLORIDE 2 MG/ML
0.5 INJECTION INTRAMUSCULAR; INTRAVENOUS; SUBCUTANEOUS
Refills: 0 | Status: DISCONTINUED | OUTPATIENT
Start: 2024-02-09 | End: 2024-02-09

## 2024-02-09 RX ORDER — OXYCODONE HYDROCHLORIDE 5 MG/1
10 TABLET ORAL EVERY 6 HOURS
Refills: 0 | Status: DISCONTINUED | OUTPATIENT
Start: 2024-02-09 | End: 2024-02-12

## 2024-02-09 RX ORDER — OXYCODONE HYDROCHLORIDE 5 MG/1
5 TABLET ORAL EVERY 6 HOURS
Refills: 0 | Status: DISCONTINUED | OUTPATIENT
Start: 2024-02-09 | End: 2024-02-12

## 2024-02-09 RX ORDER — METOPROLOL TARTRATE 50 MG
1 TABLET ORAL
Refills: 0 | DISCHARGE

## 2024-02-09 RX ORDER — ATORVASTATIN CALCIUM 80 MG/1
1 TABLET, FILM COATED ORAL
Refills: 0 | DISCHARGE

## 2024-02-09 RX ORDER — PIPERACILLIN AND TAZOBACTAM 4; .5 G/20ML; G/20ML
3.38 INJECTION, POWDER, LYOPHILIZED, FOR SOLUTION INTRAVENOUS ONCE
Refills: 0 | Status: DISCONTINUED | OUTPATIENT
Start: 2024-02-09 | End: 2024-02-09

## 2024-02-09 RX ORDER — BUMETANIDE 0.25 MG/ML
2 INJECTION INTRAMUSCULAR; INTRAVENOUS DAILY
Refills: 0 | Status: DISCONTINUED | OUTPATIENT
Start: 2024-02-09 | End: 2024-02-12

## 2024-02-09 RX ORDER — ONDANSETRON 8 MG/1
4 TABLET, FILM COATED ORAL ONCE
Refills: 0 | Status: DISCONTINUED | OUTPATIENT
Start: 2024-02-09 | End: 2024-02-09

## 2024-02-09 RX ORDER — SODIUM CHLORIDE 9 MG/ML
1000 INJECTION, SOLUTION INTRAVENOUS
Refills: 0 | Status: DISCONTINUED | OUTPATIENT
Start: 2024-02-09 | End: 2024-02-09

## 2024-02-09 RX ORDER — METOPROLOL TARTRATE 50 MG
100 TABLET ORAL DAILY
Refills: 0 | Status: DISCONTINUED | OUTPATIENT
Start: 2024-02-09 | End: 2024-02-12

## 2024-02-09 RX ORDER — ATORVASTATIN CALCIUM 80 MG/1
80 TABLET, FILM COATED ORAL AT BEDTIME
Refills: 0 | Status: DISCONTINUED | OUTPATIENT
Start: 2024-02-09 | End: 2024-02-12

## 2024-02-09 RX ORDER — DILTIAZEM HCL 120 MG
240 CAPSULE, EXT RELEASE 24 HR ORAL DAILY
Refills: 0 | Status: DISCONTINUED | OUTPATIENT
Start: 2024-02-09 | End: 2024-02-12

## 2024-02-09 RX ORDER — METOPROLOL TARTRATE 50 MG
50 TABLET ORAL AT BEDTIME
Refills: 0 | Status: DISCONTINUED | OUTPATIENT
Start: 2024-02-09 | End: 2024-02-12

## 2024-02-09 RX ORDER — PIPERACILLIN AND TAZOBACTAM 4; .5 G/20ML; G/20ML
3.38 INJECTION, POWDER, LYOPHILIZED, FOR SOLUTION INTRAVENOUS EVERY 6 HOURS
Refills: 0 | Status: DISCONTINUED | OUTPATIENT
Start: 2024-02-09 | End: 2024-02-12

## 2024-02-09 RX ORDER — INFLUENZA VIRUS VACCINE 15; 15; 15; 15 UG/.5ML; UG/.5ML; UG/.5ML; UG/.5ML
0.5 SUSPENSION INTRAMUSCULAR ONCE
Refills: 0 | Status: DISCONTINUED | OUTPATIENT
Start: 2024-02-09 | End: 2024-02-15

## 2024-02-09 RX ORDER — CHLORHEXIDINE GLUCONATE 213 G/1000ML
1 SOLUTION TOPICAL
Refills: 0 | Status: DISCONTINUED | OUTPATIENT
Start: 2024-02-09 | End: 2024-02-12

## 2024-02-09 RX ORDER — ACETAMINOPHEN 500 MG
650 TABLET ORAL EVERY 6 HOURS
Refills: 0 | Status: DISCONTINUED | OUTPATIENT
Start: 2024-02-09 | End: 2024-02-12

## 2024-02-09 RX ORDER — IBUPROFEN 200 MG
400 TABLET ORAL EVERY 6 HOURS
Refills: 0 | Status: DISCONTINUED | OUTPATIENT
Start: 2024-02-09 | End: 2024-02-12

## 2024-02-09 RX ORDER — ONDANSETRON 8 MG/1
4 TABLET, FILM COATED ORAL EVERY 6 HOURS
Refills: 0 | Status: DISCONTINUED | OUTPATIENT
Start: 2024-02-09 | End: 2024-02-12

## 2024-02-09 RX ORDER — PIPERACILLIN AND TAZOBACTAM 4; .5 G/20ML; G/20ML
3.38 INJECTION, POWDER, LYOPHILIZED, FOR SOLUTION INTRAVENOUS ONCE
Refills: 0 | Status: COMPLETED | OUTPATIENT
Start: 2024-02-09 | End: 2024-02-09

## 2024-02-09 RX ADMIN — OXYCODONE HYDROCHLORIDE 10 MILLIGRAM(S): 5 TABLET ORAL at 15:56

## 2024-02-09 RX ADMIN — HYDROMORPHONE HYDROCHLORIDE 0.5 MILLIGRAM(S): 2 INJECTION INTRAMUSCULAR; INTRAVENOUS; SUBCUTANEOUS at 09:15

## 2024-02-09 RX ADMIN — Medication 20 MILLIGRAM(S): at 12:02

## 2024-02-09 RX ADMIN — OXYCODONE HYDROCHLORIDE 5 MILLIGRAM(S): 5 TABLET ORAL at 21:53

## 2024-02-09 RX ADMIN — Medication 100 MILLIGRAM(S): at 17:14

## 2024-02-09 RX ADMIN — OXYCODONE HYDROCHLORIDE 10 MILLIGRAM(S): 5 TABLET ORAL at 23:36

## 2024-02-09 RX ADMIN — SODIUM CHLORIDE 75 MILLILITER(S): 9 INJECTION, SOLUTION INTRAVENOUS at 12:06

## 2024-02-09 RX ADMIN — HYDROMORPHONE HYDROCHLORIDE 0.5 MILLIGRAM(S): 2 INJECTION INTRAMUSCULAR; INTRAVENOUS; SUBCUTANEOUS at 08:55

## 2024-02-09 RX ADMIN — BUMETANIDE 2 MILLIGRAM(S): 0.25 INJECTION INTRAMUSCULAR; INTRAVENOUS at 12:01

## 2024-02-09 RX ADMIN — PIPERACILLIN AND TAZOBACTAM 25 GRAM(S): 4; .5 INJECTION, POWDER, LYOPHILIZED, FOR SOLUTION INTRAVENOUS at 23:36

## 2024-02-09 RX ADMIN — OXYCODONE HYDROCHLORIDE 10 MILLIGRAM(S): 5 TABLET ORAL at 17:11

## 2024-02-09 RX ADMIN — PIPERACILLIN AND TAZOBACTAM 200 GRAM(S): 4; .5 INJECTION, POWDER, LYOPHILIZED, FOR SOLUTION INTRAVENOUS at 12:03

## 2024-02-09 RX ADMIN — ATORVASTATIN CALCIUM 80 MILLIGRAM(S): 80 TABLET, FILM COATED ORAL at 21:53

## 2024-02-09 RX ADMIN — PIPERACILLIN AND TAZOBACTAM 25 GRAM(S): 4; .5 INJECTION, POWDER, LYOPHILIZED, FOR SOLUTION INTRAVENOUS at 17:14

## 2024-02-09 RX ADMIN — Medication 240 MILLIGRAM(S): at 12:02

## 2024-02-09 RX ADMIN — Medication 50 MILLIGRAM(S): at 21:53

## 2024-02-09 RX ADMIN — OXYCODONE HYDROCHLORIDE 5 MILLIGRAM(S): 5 TABLET ORAL at 22:30

## 2024-02-09 RX ADMIN — APIXABAN 5 MILLIGRAM(S): 2.5 TABLET, FILM COATED ORAL at 17:14

## 2024-02-09 NOTE — H&P ADULT - NSHPREVIEWOFSYSTEMS_GEN_ALL_CORE
REVIEW OF SYSTEMS      General:	Comfortable, NAD    Skin/Breast: Chronic LLE wound  	  Ophthalmologic: NAD  	  ENMT: Oral mucosa moist    Respiratory and Thorax: No respiratory distress, has KANDICE  	  Cardiovascular: AFIB s/p ablation, no complaints at this time    Gastrointestinal: NAD	    Genitourinary: NAD    Musculoskeletal: NAD	    Neurological: No focal deficits    Psychiatric: Alert, appropriate affect

## 2024-02-09 NOTE — H&P ADULT - NSICDXPASTMEDICALHX_GEN_ALL_CORE_FT
PAST MEDICAL HISTORY:  Afib     Back pain     DVT, lower extremity     H/O Raynaud's syndrome     H/O ventricular tachycardia     HTN (hypertension)     Lymphedema of leg     Morbidly obese     KANDICE on CPAP     
with patient

## 2024-02-09 NOTE — H&P ADULT - PATIENT'S PREFERRED PRONOUN
----- Message from Ayan Cook MD sent at 7/20/2017  8:56 AM CDT -----  No significant arrythmias.   Her/She

## 2024-02-09 NOTE — H&P ADULT - HISTORY OF PRESENT ILLNESS
Patient is a 47 yo female who presents for elective debridement and possible BTM placement to the wound on her LLE due to a stasis ulcer x 2 years.   Patient denies any cp, sob, palpitations, fever, cough, URI, abdominal pains, N/V, or  UTI x 2 wks. Patient has left leg chronic wound with c/o burning pains scale 7/10 and partial reliefs with pain meds.   As per patient exercise tolerance of 1 fos walks with out sob  Patient had COVID 2 months ago   Patient denies any s/s covid 19 and reports no contact with known positive people. Patient instructed to continue to self monitor and report any concerns to MD. Pt will continue to practice self isolation and  exposure control measures pre op

## 2024-02-09 NOTE — H&P ADULT - NSHPPHYSICALEXAM_GEN_ALL_CORE
CONSTITUTIONAL: Well groomed, no apparent distress  EYES: EOMI, No conjunctival or scleral injection, non-icteric  ENMT: Oral mucosa with moist membranes. Normal dentition; no pharyngeal injection or exudates             NECK: Supple, symmetric and without tracheal deviation   RESP: No respiratory distress, no use of accessory muscles  CV: RRR  GI: Soft, NT, ND  LYMPH: No obvious palpable lymphadenopathy  MSK: Normal gait; full ROM of all four extremities w/o pain  SKIN: Approximately 15cm x 6cm venous stasis ulcer present on posterior distal LLE  NEURO: Grossly intact  PSYCH: Appropriate insight/judgment; A+O x 3, mood and affect appropriate, recent/remote memory intact

## 2024-02-09 NOTE — H&P ADULT - ASSESSMENT
47 yo F presents for elective debridement and possible BTM placement for her chronic venous stasis wound of her LLE.     Plan  -  The wound was debrided on 2/9/24 and she will be admitted for further debridement and possible matrix placement on Monday or Tuesday  - Admit to burn unit under Dr. Doyle  - Multimodal pain control  - Encourage IS use  - OOB with assistance  - BID dressing changes with gentamicin ointment, xerofrom, abd pad, kerlix, and ace wrap to LLE  - Continue home meds including eliquis for previous DVT in RLE  - Regular diet  - Zofran prn

## 2024-02-09 NOTE — PATIENT PROFILE ADULT - FALL HARM RISK - RISK INTERVENTIONS
Assistance OOB with selected safe patient handling equipment/Assistance with ambulation/Communicate Fall Risk and Risk Factors to all staff, patient, and family/Monitor gait and stability/Reinforce activity limits and safety measures with patient and family/Sit up slowly, dangle for a short time, stand at bedside before walking/Use of alarms - bed, chair and/or voice tab/Visual Cue: Yellow wristband/Bed in lowest position, wheels locked, appropriate side rails in place/Call bell, personal items and telephone in reach/Instruct patient to call for assistance before getting out of bed or chair/Non-slip footwear when patient is out of bed/Winnie to call system/Physically safe environment - no spills, clutter or unnecessary equipment/Purposeful Proactive Rounding/Room/bathroom lighting operational, light cord in reach

## 2024-02-10 LAB
ANION GAP SERPL CALC-SCNC: 12 MMOL/L — SIGNIFICANT CHANGE UP (ref 7–14)
BASOPHILS # BLD AUTO: 0.03 K/UL — SIGNIFICANT CHANGE UP (ref 0–0.2)
BASOPHILS NFR BLD AUTO: 0.4 % — SIGNIFICANT CHANGE UP (ref 0–1)
BUN SERPL-MCNC: 14 MG/DL — SIGNIFICANT CHANGE UP (ref 10–20)
CALCIUM SERPL-MCNC: 9.1 MG/DL — SIGNIFICANT CHANGE UP (ref 8.4–10.5)
CHLORIDE SERPL-SCNC: 103 MMOL/L — SIGNIFICANT CHANGE UP (ref 98–110)
CO2 SERPL-SCNC: 28 MMOL/L — SIGNIFICANT CHANGE UP (ref 17–32)
CREAT SERPL-MCNC: 0.8 MG/DL — SIGNIFICANT CHANGE UP (ref 0.7–1.5)
EGFR: 90 ML/MIN/1.73M2 — SIGNIFICANT CHANGE UP
EOSINOPHIL # BLD AUTO: 0.06 K/UL — SIGNIFICANT CHANGE UP (ref 0–0.7)
EOSINOPHIL NFR BLD AUTO: 0.8 % — SIGNIFICANT CHANGE UP (ref 0–8)
GLUCOSE SERPL-MCNC: 119 MG/DL — HIGH (ref 70–99)
HCT VFR BLD CALC: 35 % — LOW (ref 37–47)
HGB BLD-MCNC: 10.8 G/DL — LOW (ref 12–16)
IMM GRANULOCYTES NFR BLD AUTO: 0.5 % — HIGH (ref 0.1–0.3)
LYMPHOCYTES # BLD AUTO: 1.05 K/UL — LOW (ref 1.2–3.4)
LYMPHOCYTES # BLD AUTO: 13.2 % — LOW (ref 20.5–51.1)
MAGNESIUM SERPL-MCNC: 2 MG/DL — SIGNIFICANT CHANGE UP (ref 1.8–2.4)
MCHC RBC-ENTMCNC: 28.3 PG — SIGNIFICANT CHANGE UP (ref 27–31)
MCHC RBC-ENTMCNC: 30.9 G/DL — LOW (ref 32–37)
MCV RBC AUTO: 91.6 FL — SIGNIFICANT CHANGE UP (ref 81–99)
MONOCYTES # BLD AUTO: 1.02 K/UL — HIGH (ref 0.1–0.6)
MONOCYTES NFR BLD AUTO: 12.8 % — HIGH (ref 1.7–9.3)
NEUTROPHILS # BLD AUTO: 5.74 K/UL — SIGNIFICANT CHANGE UP (ref 1.4–6.5)
NEUTROPHILS NFR BLD AUTO: 72.3 % — SIGNIFICANT CHANGE UP (ref 42.2–75.2)
NIGHT BLUE STAIN TISS: SIGNIFICANT CHANGE UP
NIGHT BLUE STAIN TISS: SIGNIFICANT CHANGE UP
NRBC # BLD: 0 /100 WBCS — SIGNIFICANT CHANGE UP (ref 0–0)
PHOSPHATE SERPL-MCNC: 4.2 MG/DL — SIGNIFICANT CHANGE UP (ref 2.1–4.9)
PLATELET # BLD AUTO: 228 K/UL — SIGNIFICANT CHANGE UP (ref 130–400)
PMV BLD: 9.7 FL — SIGNIFICANT CHANGE UP (ref 7.4–10.4)
POTASSIUM SERPL-MCNC: 4.7 MMOL/L — SIGNIFICANT CHANGE UP (ref 3.5–5)
POTASSIUM SERPL-SCNC: 4.7 MMOL/L — SIGNIFICANT CHANGE UP (ref 3.5–5)
RBC # BLD: 3.82 M/UL — LOW (ref 4.2–5.4)
RBC # FLD: 17.8 % — HIGH (ref 11.5–14.5)
SODIUM SERPL-SCNC: 143 MMOL/L — SIGNIFICANT CHANGE UP (ref 135–146)
SPECIMEN SOURCE: SIGNIFICANT CHANGE UP
SPECIMEN SOURCE: SIGNIFICANT CHANGE UP
WBC # BLD: 7.94 K/UL — SIGNIFICANT CHANGE UP (ref 4.8–10.8)
WBC # FLD AUTO: 7.94 K/UL — SIGNIFICANT CHANGE UP (ref 4.8–10.8)

## 2024-02-10 PROCEDURE — 99231 SBSQ HOSP IP/OBS SF/LOW 25: CPT

## 2024-02-10 RX ORDER — PANTOPRAZOLE SODIUM 20 MG/1
40 TABLET, DELAYED RELEASE ORAL
Refills: 0 | Status: DISCONTINUED | OUTPATIENT
Start: 2024-02-10 | End: 2024-02-12

## 2024-02-10 RX ORDER — MORPHINE SULFATE 50 MG/1
4 CAPSULE, EXTENDED RELEASE ORAL
Refills: 0 | Status: DISCONTINUED | OUTPATIENT
Start: 2024-02-10 | End: 2024-02-10

## 2024-02-10 RX ORDER — GENTAMICIN SULFATE 0.1 %
1 OINTMENT (GRAM) TOPICAL
Refills: 0 | Status: DISCONTINUED | OUTPATIENT
Start: 2024-02-10 | End: 2024-02-12

## 2024-02-10 RX ORDER — HYDROMORPHONE HYDROCHLORIDE 2 MG/ML
1 INJECTION INTRAMUSCULAR; INTRAVENOUS; SUBCUTANEOUS
Refills: 0 | Status: DISCONTINUED | OUTPATIENT
Start: 2024-02-10 | End: 2024-02-12

## 2024-02-10 RX ORDER — HYDROMORPHONE HYDROCHLORIDE 2 MG/ML
0.5 INJECTION INTRAMUSCULAR; INTRAVENOUS; SUBCUTANEOUS ONCE
Refills: 0 | Status: DISCONTINUED | OUTPATIENT
Start: 2024-02-10 | End: 2024-02-10

## 2024-02-10 RX ADMIN — HYDROMORPHONE HYDROCHLORIDE 1 MILLIGRAM(S): 2 INJECTION INTRAMUSCULAR; INTRAVENOUS; SUBCUTANEOUS at 20:52

## 2024-02-10 RX ADMIN — APIXABAN 5 MILLIGRAM(S): 2.5 TABLET, FILM COATED ORAL at 05:24

## 2024-02-10 RX ADMIN — HYDROMORPHONE HYDROCHLORIDE 1 MILLIGRAM(S): 2 INJECTION INTRAMUSCULAR; INTRAVENOUS; SUBCUTANEOUS at 21:30

## 2024-02-10 RX ADMIN — Medication 650 MILLIGRAM(S): at 06:30

## 2024-02-10 RX ADMIN — OXYCODONE HYDROCHLORIDE 5 MILLIGRAM(S): 5 TABLET ORAL at 17:22

## 2024-02-10 RX ADMIN — PIPERACILLIN AND TAZOBACTAM 25 GRAM(S): 4; .5 INJECTION, POWDER, LYOPHILIZED, FOR SOLUTION INTRAVENOUS at 23:34

## 2024-02-10 RX ADMIN — Medication 50 MILLIGRAM(S): at 21:16

## 2024-02-10 RX ADMIN — PANTOPRAZOLE SODIUM 40 MILLIGRAM(S): 20 TABLET, DELAYED RELEASE ORAL at 08:37

## 2024-02-10 RX ADMIN — CHLORHEXIDINE GLUCONATE 1 APPLICATION(S): 213 SOLUTION TOPICAL at 05:25

## 2024-02-10 RX ADMIN — HYDROMORPHONE HYDROCHLORIDE 0.5 MILLIGRAM(S): 2 INJECTION INTRAMUSCULAR; INTRAVENOUS; SUBCUTANEOUS at 11:26

## 2024-02-10 RX ADMIN — HYDROMORPHONE HYDROCHLORIDE 0.5 MILLIGRAM(S): 2 INJECTION INTRAMUSCULAR; INTRAVENOUS; SUBCUTANEOUS at 10:25

## 2024-02-10 RX ADMIN — ATORVASTATIN CALCIUM 80 MILLIGRAM(S): 80 TABLET, FILM COATED ORAL at 21:17

## 2024-02-10 RX ADMIN — Medication 240 MILLIGRAM(S): at 05:53

## 2024-02-10 RX ADMIN — Medication 100 MILLIGRAM(S): at 05:24

## 2024-02-10 RX ADMIN — OXYCODONE HYDROCHLORIDE 10 MILLIGRAM(S): 5 TABLET ORAL at 08:30

## 2024-02-10 RX ADMIN — PIPERACILLIN AND TAZOBACTAM 25 GRAM(S): 4; .5 INJECTION, POWDER, LYOPHILIZED, FOR SOLUTION INTRAVENOUS at 17:09

## 2024-02-10 RX ADMIN — Medication 1 APPLICATION(S): at 21:16

## 2024-02-10 RX ADMIN — Medication 650 MILLIGRAM(S): at 05:54

## 2024-02-10 RX ADMIN — OXYCODONE HYDROCHLORIDE 10 MILLIGRAM(S): 5 TABLET ORAL at 07:59

## 2024-02-10 RX ADMIN — MORPHINE SULFATE 4 MILLIGRAM(S): 50 CAPSULE, EXTENDED RELEASE ORAL at 09:45

## 2024-02-10 RX ADMIN — PIPERACILLIN AND TAZOBACTAM 25 GRAM(S): 4; .5 INJECTION, POWDER, LYOPHILIZED, FOR SOLUTION INTRAVENOUS at 11:27

## 2024-02-10 RX ADMIN — OXYCODONE HYDROCHLORIDE 10 MILLIGRAM(S): 5 TABLET ORAL at 14:40

## 2024-02-10 RX ADMIN — MORPHINE SULFATE 4 MILLIGRAM(S): 50 CAPSULE, EXTENDED RELEASE ORAL at 09:30

## 2024-02-10 RX ADMIN — OXYCODONE HYDROCHLORIDE 5 MILLIGRAM(S): 5 TABLET ORAL at 17:50

## 2024-02-10 RX ADMIN — PIPERACILLIN AND TAZOBACTAM 25 GRAM(S): 4; .5 INJECTION, POWDER, LYOPHILIZED, FOR SOLUTION INTRAVENOUS at 05:24

## 2024-02-10 RX ADMIN — APIXABAN 5 MILLIGRAM(S): 2.5 TABLET, FILM COATED ORAL at 17:09

## 2024-02-10 RX ADMIN — BUMETANIDE 2 MILLIGRAM(S): 0.25 INJECTION INTRAMUSCULAR; INTRAVENOUS at 05:52

## 2024-02-10 RX ADMIN — OXYCODONE HYDROCHLORIDE 10 MILLIGRAM(S): 5 TABLET ORAL at 14:09

## 2024-02-10 RX ADMIN — OXYCODONE HYDROCHLORIDE 10 MILLIGRAM(S): 5 TABLET ORAL at 00:10

## 2024-02-10 NOTE — CONSULT NOTE ADULT - ASSESSMENT
ASSESSMENT  47 yo female who presents for elective debridement and possible BTM placement to the wound on her LLE due to a stasis ulcer x 2 years.     IMPRESSION  #Chronic LLE Ulcer   - s/p debridement 2/9 - adherent necrotic tissue   - Wound Cx OSH Citrobacter freundii, Pseudomonas, E avium     #Obesity BMI (kg/m2): 52.8, 52.8, 52.8  #Abx allergy: No Known Allergies    RECOMMENDATIONS  This is a preliminary incomplete pended note, all final recommendations to follow after interview and examination of the patient.    Please call or message on [employer] Teams if with any questions.  Spectra [phone]       ASSESSMENT  47 yo female who presents for elective debridement and possible BTM placement to the wound on her LLE due to a stasis ulcer x 2 years.     IMPRESSION  #Chronic LLE Ulcer   - s/p debridement 2/9 - adherent necrotic tissue   - Wound Cx OSH Citrobacter freundii, Pseudomonas, E avium     #Obesity BMI (kg/m2): 52.8, 52.8, 52.8  #Abx allergy: No Known Allergies    RECOMMENDATIONS  - agree with zosyn 3.375 mg q 6hours for now   - will follow-up OR cx from 2/9     Please call or message on [employer] Teams if with any questions.  Spectra [phone]

## 2024-02-10 NOTE — CONSULT NOTE ADULT - SUBJECTIVE AND OBJECTIVE BOX
YANETH ALMARAZ  48y, Female  Allergy: No Known Allergies      CHIEF COMPLAINT: LLE wound management (09 Feb 2024 09:09)      LOS  1d    HPI:  47 yo female who presents for elective debridement and possible BTM placement to the wound on her LLE due to a stasis ulcer x 2 years.   Patient denies any cp, sob, palpitations, fever, cough, URI, abdominal pains, N/V, or  UTI x 2 wks. Patient has left leg chronic wound with c/o burning pains scale 7/10 and partial reliefs with pain meds.   As per patient exercise tolerance of 1 fos walks with out sob  Patient had COVID 2 months ago   Patient denies any s/s covid 19 and reports no contact with known positive people. Patient instructed to continue to self monitor and report any concerns to MD. Pt will continue to practice self isolation and  exposure control measures pre op         (09 Feb 2024 09:09)      INFECTIOUS DISEASE HISTORY:  History as above.    PAST MEDICAL & SURGICAL HISTORY:  HTN (hypertension)      Afib      Back pain      H/O Raynaud's syndrome      DVT, lower extremity      KANDICE on CPAP      H/O ventricular tachycardia      Morbidly obese      Lymphedema of leg      History of laparoscopic cholecystectomy      H/O prior ablation treatment      AICD (automatic cardioverter/defibrillator) present          FAMILY HISTORY  FH: myocardial infarction (Father)        SOCIAL HISTORY  Social History:  Former smoker   No illicit drug use  Social alcohol use (26 Dec 2023 07:34)        ROS  General: Denies rigors, nightsweats  HEENT: Denies headache, rhinorrhea, sore throat, eye pain  CV: Denies CP, palpitations  PULM: Denies wheezing, hemoptysis  GI: Denies hematemesis, hematochezia, melena  : Denies discharge, hematuria  MSK: Denies arthralgias, myalgias  SKIN: Denies rash, lesions  NEURO: Denies paresthesias, weakness  PSYCH: Denies depression, anxiety    VITALS:  T(F): 97.2, Max: 98.8 (02-09-24 @ 23:35)  HR: 66  BP: 106/59  RR: 18Vital Signs Last 24 Hrs  T(C): 36.2 (10 Feb 2024 08:18), Max: 37.1 (09 Feb 2024 23:35)  T(F): 97.2 (10 Feb 2024 08:18), Max: 98.8 (09 Feb 2024 23:35)  HR: 66 (10 Feb 2024 08:18) (66 - 104)  BP: 106/59 (10 Feb 2024 08:18) (104/60 - 122/60)  BP(mean): 86 (10 Feb 2024 08:00) (86 - 86)  RR: 18 (10 Feb 2024 08:18) (17 - 18)  SpO2: 95% (10 Feb 2024 08:00) (95% - 98%)    Parameters below as of 10 Feb 2024 08:00  Patient On (Oxygen Delivery Method): room air        PHYSICAL EXAM:  Gen: NAD, resting in bed  HEENT: Normocephalic, atraumatic  Neck: supple, no lymphadenopathy  CV: Regular rate & regular rhythm  Lungs: decreased BS at bases, no fremitus  Abdomen: Soft, BS present  Ext: Warm, well perfused  Neuro: non focal, awake  Skin: no rash, no erythema  Lines: no phlebitis    TESTS & MEASUREMENTS:                  Culture - Fungal, Other (collected 02-09-24 @ 09:20)  Source: .Other None  Preliminary Report (02-10-24 @ 08:12):    Testing in progress    Culture - Fungal, Other (collected 02-09-24 @ 09:20)  Source: .Other None  Preliminary Report (02-10-24 @ 08:12):    Testing in progress    Culture - Other (collected 06-08-23 @ 09:00)  Source: .Other left leg  Final Report (06-11-23 @ 16:48):    Numerous Pseudomonas aeruginosa    Numerous Streptococcus mitis/oralis group "Susceptibilities not performed"    Normal skin blayne isolated  Organism: Pseudomonas aeruginosa (06-11-23 @ 16:48)  Organism: Pseudomonas aeruginosa (06-11-23 @ 16:48)      Method Type: TEE      -  Amikacin: S <=16      -  Aztreonam: I 16      -  Cefepime: S 4      -  Ceftazidime: S 4      -  Ciprofloxacin: R 2      -  Gentamicin: S 4      -  Imipenem: S <=1      -  Levofloxacin: R 4      -  Meropenem: S 2      -  Piperacillin/Tazobactam: S <=8      -  Tobramycin: S <=2            INFECTIOUS DISEASES TESTING      RADIOLOGY & ADDITIONAL TESTS:  I have personally reviewed the last Chest xray  CXR      CT      CARDIOLOGY TESTING      MEDICATIONS  apixaban 5 Oral two times a day  atorvastatin 80 Oral at bedtime  buMETAnide 2 Oral daily  chlorhexidine 4% Liquid 1 Topical <User Schedule>  diltiazem    Oral daily  gentamicin 0.1% Ointment 1 Topical two times a day  influenza   Vaccine 0.5 IntraMuscular once  metoprolol succinate ER 50 Oral at bedtime  metoprolol succinate  Oral daily  pantoprazole    Tablet 40 Oral before breakfast  piperacillin/tazobactam IVPB.. 3.375 IV Intermittent every 6 hours      Weight  Weight (kg): 148.3 (02-09-24 @ 07:20)    ANTIBIOTICS:  piperacillin/tazobactam IVPB.. 3.375 Gram(s) IV Intermittent every 6 hours      ALLERGIES:  No Known Allergies       YANETH ALMARAZ  48y, Female  Allergy: No Known Allergies      CHIEF COMPLAINT: LLE wound management (09 Feb 2024 09:09)      LOS  1d    HPI:  47 yo female who presents for elective debridement and possible BTM placement to the wound on her LLE due to a stasis ulcer x 2 years.   Patient denies any cp, sob, palpitations, fever, cough, URI, abdominal pains, N/V, or  UTI x 2 wks. Patient has left leg chronic wound with c/o burning pains scale 7/10 and partial reliefs with pain meds.   As per patient exercise tolerance of 1 fos walks with out sob  Patient had COVID 2 months ago   Patient denies any s/s covid 19 and reports no contact with known positive people. Patient instructed to continue to self monitor and report any concerns to MD. Pt will continue to practice self isolation and  exposure control measures pre op         (09 Feb 2024 09:09)      INFECTIOUS DISEASE HISTORY:  History as above.    PAST MEDICAL & SURGICAL HISTORY:  HTN (hypertension)      Afib      Back pain      H/O Raynaud's syndrome      DVT, lower extremity      KANDICE on CPAP      H/O ventricular tachycardia      Morbidly obese      Lymphedema of leg      History of laparoscopic cholecystectomy      H/O prior ablation treatment      AICD (automatic cardioverter/defibrillator) present          FAMILY HISTORY  FH: myocardial infarction (Father)        SOCIAL HISTORY  Social History:  Former smoker   No illicit drug use  Social alcohol use (26 Dec 2023 07:34)        ROS  General: Denies rigors, nightsweats  HEENT: Denies headache, rhinorrhea, sore throat, eye pain  CV: Denies CP, palpitations  PULM: Denies wheezing, hemoptysis  GI: Denies hematemesis, hematochezia, melena  : Denies discharge, hematuria  MSK: Denies arthralgias, myalgias  SKIN: Denies rash, lesions  NEURO: Denies paresthesias, weakness  PSYCH: Denies depression, anxiety    VITALS:  T(F): 97.2, Max: 98.8 (02-09-24 @ 23:35)  HR: 66  BP: 106/59  RR: 18Vital Signs Last 24 Hrs  T(C): 36.2 (10 Feb 2024 08:18), Max: 37.1 (09 Feb 2024 23:35)  T(F): 97.2 (10 Feb 2024 08:18), Max: 98.8 (09 Feb 2024 23:35)  HR: 66 (10 Feb 2024 08:18) (66 - 104)  BP: 106/59 (10 Feb 2024 08:18) (104/60 - 122/60)  BP(mean): 86 (10 Feb 2024 08:00) (86 - 86)  RR: 18 (10 Feb 2024 08:18) (17 - 18)  SpO2: 95% (10 Feb 2024 08:00) (95% - 98%)    Parameters below as of 10 Feb 2024 08:00  Patient On (Oxygen Delivery Method): room air        PHYSICAL EXAM:  Gen: NAD, resting in bed  HEENT: Normocephalic, atraumatic  Neck: supple, no lymphadenopathy  CV: Regular rate & regular rhythm  Lungs: decreased BS at bases, no fremitus  Abdomen: Soft, BS present  Ext: LLE dressed -- reviewed pictures -- full thickness wound with mostly granulation tissue   Neuro: non focal, awake  Skin: no rash, no erythema  Lines: no phlebitis    TESTS & MEASUREMENTS:                  Culture - Fungal, Other (collected 02-09-24 @ 09:20)  Source: .Other None  Preliminary Report (02-10-24 @ 08:12):    Testing in progress    Culture - Fungal, Other (collected 02-09-24 @ 09:20)  Source: .Other None  Preliminary Report (02-10-24 @ 08:12):    Testing in progress    Culture - Other (collected 06-08-23 @ 09:00)  Source: .Other left leg  Final Report (06-11-23 @ 16:48):    Numerous Pseudomonas aeruginosa    Numerous Streptococcus mitis/oralis group "Susceptibilities not performed"    Normal skin blayne isolated  Organism: Pseudomonas aeruginosa (06-11-23 @ 16:48)  Organism: Pseudomonas aeruginosa (06-11-23 @ 16:48)      Method Type: TEE      -  Amikacin: S <=16      -  Aztreonam: I 16      -  Cefepime: S 4      -  Ceftazidime: S 4      -  Ciprofloxacin: R 2      -  Gentamicin: S 4      -  Imipenem: S <=1      -  Levofloxacin: R 4      -  Meropenem: S 2      -  Piperacillin/Tazobactam: S <=8      -  Tobramycin: S <=2            INFECTIOUS DISEASES TESTING      RADIOLOGY & ADDITIONAL TESTS:  I have personally reviewed the last Chest xray  CXR      CT      CARDIOLOGY TESTING      MEDICATIONS  apixaban 5 Oral two times a day  atorvastatin 80 Oral at bedtime  buMETAnide 2 Oral daily  chlorhexidine 4% Liquid 1 Topical <User Schedule>  diltiazem    Oral daily  gentamicin 0.1% Ointment 1 Topical two times a day  influenza   Vaccine 0.5 IntraMuscular once  metoprolol succinate ER 50 Oral at bedtime  metoprolol succinate  Oral daily  pantoprazole    Tablet 40 Oral before breakfast  piperacillin/tazobactam IVPB.. 3.375 IV Intermittent every 6 hours      Weight  Weight (kg): 148.3 (02-09-24 @ 07:20)    ANTIBIOTICS:  piperacillin/tazobactam IVPB.. 3.375 Gram(s) IV Intermittent every 6 hours      ALLERGIES:  No Known Allergies

## 2024-02-10 NOTE — PROGRESS NOTE ADULT - SUBJECTIVE AND OBJECTIVE BOX
Patient is a 48y old  Female who presents with a chief complaint of LLE wound management (10 Feb 2024 12:17)    No acute events overnight    Vital Signs Last 24 Hrs  T(C): 36.2 (10 Feb 2024 08:18), Max: 37.1 (09 Feb 2024 23:35)  T(F): 97.2 (10 Feb 2024 08:18), Max: 98.8 (09 Feb 2024 23:35)  HR: 66 (10 Feb 2024 08:18) (66 - 100)  BP: 106/59 (10 Feb 2024 08:18) (106/59 - 122/60)  BP(mean): 86 (10 Feb 2024 08:00) (86 - 86)  RR: 18 (10 Feb 2024 08:18) (18 - 18)  SpO2: 95% (10 Feb 2024 08:00) (95% - 98%)    Parameters below as of 10 Feb 2024 08:00  Patient On (Oxygen Delivery Method): room air      I&O's Summary    09 Feb 2024 07:01  -  10 Feb 2024 07:00  --------------------------------------------------------  IN: 0 mL / OUT: 1800 mL / NET: -1800 mL        Meds:  MEDICATIONS  (STANDING):  apixaban 5 milliGRAM(s) Oral two times a day  atorvastatin 80 milliGRAM(s) Oral at bedtime  buMETAnide 2 milliGRAM(s) Oral daily  chlorhexidine 4% Liquid 1 Application(s) Topical <User Schedule>  diltiazem    milliGRAM(s) Oral daily  gentamicin 0.1% Ointment 1 Application(s) Topical two times a day  influenza   Vaccine 0.5 milliLiter(s) IntraMuscular once  metoprolol succinate  milliGRAM(s) Oral daily  metoprolol succinate ER 50 milliGRAM(s) Oral at bedtime  pantoprazole    Tablet 40 milliGRAM(s) Oral before breakfast  piperacillin/tazobactam IVPB.. 3.375 Gram(s) IV Intermittent every 6 hours    MEDICATIONS  (PRN):  acetaminophen     Tablet .. 650 milliGRAM(s) Oral every 6 hours PRN Temp greater or equal to 38C (100.4F), Mild Pain (1 - 3), Moderate Pain (4 - 6), Severe Pain (7 - 10)  HYDROmorphone  Injectable 1 milliGRAM(s) IV Push two times a day PRN wound care  ibuprofen  Tablet. 400 milliGRAM(s) Oral every 6 hours PRN Temp greater or equal to 38C (100.4F), Mild Pain (1 - 3), Moderate Pain (4 - 6), Severe Pain (7 - 10)  ondansetron Injectable 4 milliGRAM(s) IV Push every 6 hours PRN Nausea  oxyCODONE    IR 10 milliGRAM(s) Oral every 6 hours PRN Severe Pain (7 - 10)  oxyCODONE    IR 5 milliGRAM(s) Oral every 6 hours PRN Severe Pain (7 - 10)        Culture - Fungal, Other (collected 09 Feb 2024 09:20)  Source: .Other None  Preliminary Report (10 Feb 2024 08:12):    Testing in progress    Culture - Acid Fast - Other w/Smear (collected 09 Feb 2024 09:20)  Source: .Other None    Culture - Fungal, Other (collected 09 Feb 2024 09:20)  Source: .Other None  Preliminary Report (10 Feb 2024 08:12):    Testing in progress    Culture - Acid Fast - Other w/Smear (collected 09 Feb 2024 09:20)  Source: .Other None        Labs:                        10.8   7.94  )-----------( 228      ( 10 Feb 2024 11:01 )             35.0     02-10    143  |  103  |  14  ----------------------------<  119<H>  4.7   |  28  |  0.8    Ca    9.1      10 Feb 2024 11:01  Phos  4.2     02-10  Mg     2.0     02-10          PE: AAO x 3  Full thckness wound to LLE with granulation tissue and yellow eschar, serosang dc, edema+

## 2024-02-10 NOTE — PROGRESS NOTE ADULT - ASSESSMENT
A/P: POD 1 s/p luis antonio LLE wound    cont wound care  Cont IV antibx  DVT GI Prophylaxis  Pain control  OT/PT  F/u Cx  Elevation  OR next week

## 2024-02-11 LAB
-  AMOXICILLIN/CLAVULANIC ACID: SIGNIFICANT CHANGE UP
-  AMOXICILLIN/CLAVULANIC ACID: SIGNIFICANT CHANGE UP
-  AMPICILLIN/SULBACTAM: SIGNIFICANT CHANGE UP
-  AMPICILLIN/SULBACTAM: SIGNIFICANT CHANGE UP
-  AMPICILLIN: SIGNIFICANT CHANGE UP
-  AZTREONAM: SIGNIFICANT CHANGE UP
-  AZTREONAM: SIGNIFICANT CHANGE UP
-  CEFAZOLIN: SIGNIFICANT CHANGE UP
-  CEFAZOLIN: SIGNIFICANT CHANGE UP
-  CEFEPIME: SIGNIFICANT CHANGE UP
-  CEFEPIME: SIGNIFICANT CHANGE UP
-  CEFOXITIN: SIGNIFICANT CHANGE UP
-  CEFOXITIN: SIGNIFICANT CHANGE UP
-  CEFTRIAXONE: SIGNIFICANT CHANGE UP
-  CEFTRIAXONE: SIGNIFICANT CHANGE UP
-  CIPROFLOXACIN: SIGNIFICANT CHANGE UP
-  CIPROFLOXACIN: SIGNIFICANT CHANGE UP
-  ERTAPENEM: SIGNIFICANT CHANGE UP
-  ERTAPENEM: SIGNIFICANT CHANGE UP
-  GENTAMICIN: SIGNIFICANT CHANGE UP
-  GENTAMICIN: SIGNIFICANT CHANGE UP
-  IMIPENEM: SIGNIFICANT CHANGE UP
-  IMIPENEM: SIGNIFICANT CHANGE UP
-  LEVOFLOXACIN: SIGNIFICANT CHANGE UP
-  LEVOFLOXACIN: SIGNIFICANT CHANGE UP
-  MEROPENEM: SIGNIFICANT CHANGE UP
-  MEROPENEM: SIGNIFICANT CHANGE UP
-  PIPERACILLIN/TAZOBACTAM: SIGNIFICANT CHANGE UP
-  PIPERACILLIN/TAZOBACTAM: SIGNIFICANT CHANGE UP
-  TOBRAMYCIN: SIGNIFICANT CHANGE UP
-  TOBRAMYCIN: SIGNIFICANT CHANGE UP
-  TRIMETHOPRIM/SULFAMETHOXAZOLE: SIGNIFICANT CHANGE UP
-  TRIMETHOPRIM/SULFAMETHOXAZOLE: SIGNIFICANT CHANGE UP
-  VANCOMYCIN: SIGNIFICANT CHANGE UP
ANION GAP SERPL CALC-SCNC: 14 MMOL/L — SIGNIFICANT CHANGE UP (ref 7–14)
BASOPHILS # BLD AUTO: 0.05 K/UL — SIGNIFICANT CHANGE UP (ref 0–0.2)
BASOPHILS NFR BLD AUTO: 0.6 % — SIGNIFICANT CHANGE UP (ref 0–1)
BUN SERPL-MCNC: 16 MG/DL — SIGNIFICANT CHANGE UP (ref 10–20)
CALCIUM SERPL-MCNC: 8.8 MG/DL — SIGNIFICANT CHANGE UP (ref 8.4–10.5)
CHLORIDE SERPL-SCNC: 102 MMOL/L — SIGNIFICANT CHANGE UP (ref 98–110)
CO2 SERPL-SCNC: 26 MMOL/L — SIGNIFICANT CHANGE UP (ref 17–32)
CREAT SERPL-MCNC: 0.8 MG/DL — SIGNIFICANT CHANGE UP (ref 0.7–1.5)
CULTURE RESULTS: ABNORMAL
EGFR: 90 ML/MIN/1.73M2 — SIGNIFICANT CHANGE UP
EOSINOPHIL # BLD AUTO: 0.22 K/UL — SIGNIFICANT CHANGE UP (ref 0–0.7)
EOSINOPHIL NFR BLD AUTO: 2.6 % — SIGNIFICANT CHANGE UP (ref 0–8)
GLUCOSE SERPL-MCNC: 122 MG/DL — HIGH (ref 70–99)
HCG SERPL QL: NEGATIVE — SIGNIFICANT CHANGE UP
HCT VFR BLD CALC: 34.3 % — LOW (ref 37–47)
HGB BLD-MCNC: 10.6 G/DL — LOW (ref 12–16)
IMM GRANULOCYTES NFR BLD AUTO: 0.2 % — SIGNIFICANT CHANGE UP (ref 0.1–0.3)
LYMPHOCYTES # BLD AUTO: 1.41 K/UL — SIGNIFICANT CHANGE UP (ref 1.2–3.4)
LYMPHOCYTES # BLD AUTO: 16.7 % — LOW (ref 20.5–51.1)
MAGNESIUM SERPL-MCNC: 1.9 MG/DL — SIGNIFICANT CHANGE UP (ref 1.8–2.4)
MCHC RBC-ENTMCNC: 28 PG — SIGNIFICANT CHANGE UP (ref 27–31)
MCHC RBC-ENTMCNC: 30.9 G/DL — LOW (ref 32–37)
MCV RBC AUTO: 90.5 FL — SIGNIFICANT CHANGE UP (ref 81–99)
METHOD TYPE: SIGNIFICANT CHANGE UP
MONOCYTES # BLD AUTO: 1.43 K/UL — HIGH (ref 0.1–0.6)
MONOCYTES NFR BLD AUTO: 17 % — HIGH (ref 1.7–9.3)
NEUTROPHILS # BLD AUTO: 5.29 K/UL — SIGNIFICANT CHANGE UP (ref 1.4–6.5)
NEUTROPHILS NFR BLD AUTO: 62.9 % — SIGNIFICANT CHANGE UP (ref 42.2–75.2)
NRBC # BLD: 0 /100 WBCS — SIGNIFICANT CHANGE UP (ref 0–0)
PHOSPHATE SERPL-MCNC: 4.3 MG/DL — SIGNIFICANT CHANGE UP (ref 2.1–4.9)
PLATELET # BLD AUTO: 213 K/UL — SIGNIFICANT CHANGE UP (ref 130–400)
PMV BLD: 9.7 FL — SIGNIFICANT CHANGE UP (ref 7.4–10.4)
POTASSIUM SERPL-MCNC: 4.2 MMOL/L — SIGNIFICANT CHANGE UP (ref 3.5–5)
POTASSIUM SERPL-SCNC: 4.2 MMOL/L — SIGNIFICANT CHANGE UP (ref 3.5–5)
RBC # BLD: 3.79 M/UL — LOW (ref 4.2–5.4)
RBC # FLD: 17.6 % — HIGH (ref 11.5–14.5)
SODIUM SERPL-SCNC: 142 MMOL/L — SIGNIFICANT CHANGE UP (ref 135–146)
SPECIMEN SOURCE: SIGNIFICANT CHANGE UP
WBC # BLD: 8.42 K/UL — SIGNIFICANT CHANGE UP (ref 4.8–10.8)
WBC # FLD AUTO: 8.42 K/UL — SIGNIFICANT CHANGE UP (ref 4.8–10.8)

## 2024-02-11 PROCEDURE — 71045 X-RAY EXAM CHEST 1 VIEW: CPT | Mod: 26

## 2024-02-11 PROCEDURE — 93010 ELECTROCARDIOGRAM REPORT: CPT

## 2024-02-11 PROCEDURE — 99231 SBSQ HOSP IP/OBS SF/LOW 25: CPT | Mod: GC

## 2024-02-11 RX ORDER — POLYETHYLENE GLYCOL 3350 17 G/17G
17 POWDER, FOR SOLUTION ORAL DAILY
Refills: 0 | Status: DISCONTINUED | OUTPATIENT
Start: 2024-02-11 | End: 2024-02-12

## 2024-02-11 RX ADMIN — APIXABAN 5 MILLIGRAM(S): 2.5 TABLET, FILM COATED ORAL at 06:22

## 2024-02-11 RX ADMIN — CHLORHEXIDINE GLUCONATE 1 APPLICATION(S): 213 SOLUTION TOPICAL at 06:23

## 2024-02-11 RX ADMIN — Medication 240 MILLIGRAM(S): at 06:26

## 2024-02-11 RX ADMIN — PANTOPRAZOLE SODIUM 40 MILLIGRAM(S): 20 TABLET, DELAYED RELEASE ORAL at 06:22

## 2024-02-11 RX ADMIN — PIPERACILLIN AND TAZOBACTAM 25 GRAM(S): 4; .5 INJECTION, POWDER, LYOPHILIZED, FOR SOLUTION INTRAVENOUS at 23:20

## 2024-02-11 RX ADMIN — OXYCODONE HYDROCHLORIDE 10 MILLIGRAM(S): 5 TABLET ORAL at 14:20

## 2024-02-11 RX ADMIN — PIPERACILLIN AND TAZOBACTAM 25 GRAM(S): 4; .5 INJECTION, POWDER, LYOPHILIZED, FOR SOLUTION INTRAVENOUS at 06:26

## 2024-02-11 RX ADMIN — PIPERACILLIN AND TAZOBACTAM 25 GRAM(S): 4; .5 INJECTION, POWDER, LYOPHILIZED, FOR SOLUTION INTRAVENOUS at 17:15

## 2024-02-11 RX ADMIN — Medication 1 APPLICATION(S): at 23:19

## 2024-02-11 RX ADMIN — ATORVASTATIN CALCIUM 80 MILLIGRAM(S): 80 TABLET, FILM COATED ORAL at 23:19

## 2024-02-11 RX ADMIN — OXYCODONE HYDROCHLORIDE 5 MILLIGRAM(S): 5 TABLET ORAL at 17:32

## 2024-02-11 RX ADMIN — OXYCODONE HYDROCHLORIDE 5 MILLIGRAM(S): 5 TABLET ORAL at 18:00

## 2024-02-11 RX ADMIN — PIPERACILLIN AND TAZOBACTAM 25 GRAM(S): 4; .5 INJECTION, POWDER, LYOPHILIZED, FOR SOLUTION INTRAVENOUS at 11:48

## 2024-02-11 RX ADMIN — Medication 50 MILLIGRAM(S): at 23:19

## 2024-02-11 RX ADMIN — HYDROMORPHONE HYDROCHLORIDE 1 MILLIGRAM(S): 2 INJECTION INTRAMUSCULAR; INTRAVENOUS; SUBCUTANEOUS at 08:45

## 2024-02-11 RX ADMIN — POLYETHYLENE GLYCOL 3350 17 GRAM(S): 17 POWDER, FOR SOLUTION ORAL at 13:52

## 2024-02-11 RX ADMIN — HYDROMORPHONE HYDROCHLORIDE 1 MILLIGRAM(S): 2 INJECTION INTRAMUSCULAR; INTRAVENOUS; SUBCUTANEOUS at 21:46

## 2024-02-11 RX ADMIN — OXYCODONE HYDROCHLORIDE 10 MILLIGRAM(S): 5 TABLET ORAL at 23:19

## 2024-02-11 RX ADMIN — OXYCODONE HYDROCHLORIDE 5 MILLIGRAM(S): 5 TABLET ORAL at 06:25

## 2024-02-11 RX ADMIN — Medication 100 MILLIGRAM(S): at 07:42

## 2024-02-11 RX ADMIN — Medication 1 APPLICATION(S): at 08:38

## 2024-02-11 RX ADMIN — HYDROMORPHONE HYDROCHLORIDE 1 MILLIGRAM(S): 2 INJECTION INTRAMUSCULAR; INTRAVENOUS; SUBCUTANEOUS at 08:33

## 2024-02-11 RX ADMIN — OXYCODONE HYDROCHLORIDE 10 MILLIGRAM(S): 5 TABLET ORAL at 13:52

## 2024-02-11 RX ADMIN — BUMETANIDE 2 MILLIGRAM(S): 0.25 INJECTION INTRAMUSCULAR; INTRAVENOUS at 06:22

## 2024-02-11 NOTE — PROGRESS NOTE ADULT - SUBJECTIVE AND OBJECTIVE BOX
HPI  Patient is a 48y old  Female who presents with a chief complaint of LLE wound management (10 Feb 2024 12:17). Her wound was debrided in the OR on 2/9 and she was admitted for further debridement in the OR and possible BTM placement on 2/12    Interval  No acute events overnight. Pain currently well controlled.  Silver nitrate was used to control minor bleeding from wound.    ICU Vital Signs Last 24 Hrs  T(C): 35.8 (11 Feb 2024 08:00), Max: 36.3 (10 Feb 2024 15:51)  T(F): 96.4 (11 Feb 2024 08:00), Max: 97.3 (10 Feb 2024 15:51)  HR: 95 (11 Feb 2024 08:00) (80 - 107)  BP: 108/65 (11 Feb 2024 08:00) (93/57 - 129/89)  BP(mean): 84 (11 Feb 2024 08:00) (70 - 104)  ABP: --  ABP(mean): --  RR: 18 (11 Feb 2024 08:00) (18 - 18)  SpO2: 97% (11 Feb 2024 08:00) (94% - 97%)    O2 Parameters below as of 11 Feb 2024 08:00  Patient On (Oxygen Delivery Method): room air          I&O's Summary    09 Feb 2024 07:01  -  10 Feb 2024 07:00  --------------------------------------------------------  IN: 0 mL / OUT: 1800 mL / NET: -1800 mL        Meds:  MEDICATIONS  (STANDING):  apixaban 5 milliGRAM(s) Oral two times a day  atorvastatin 80 milliGRAM(s) Oral at bedtime  buMETAnide 2 milliGRAM(s) Oral daily  chlorhexidine 4% Liquid 1 Application(s) Topical <User Schedule>  diltiazem    milliGRAM(s) Oral daily  gentamicin 0.1% Ointment 1 Application(s) Topical two times a day  influenza   Vaccine 0.5 milliLiter(s) IntraMuscular once  metoprolol succinate  milliGRAM(s) Oral daily  metoprolol succinate ER 50 milliGRAM(s) Oral at bedtime  pantoprazole    Tablet 40 milliGRAM(s) Oral before breakfast  piperacillin/tazobactam IVPB.. 3.375 Gram(s) IV Intermittent every 6 hours    MEDICATIONS  (PRN):  Culture - Acid Fast - Other w/Smear (02.09.24 @ 09:20)    Specimen Source: .Other None   Acid Fast Bacilli Smear:   No acid-fast bacilli seen by fluorochrome stain  Specimen was sent on a swab.  Swabs are not recommended  for optimal recovery of Mycobacteria from culture and may be falsely  negative.    acetaminophen     Tablet .. 650 milliGRAM(s) Oral every 6 hours PRN Temp greater or equal to 38C (100.4F), Mild Pain (1 - 3), Moderate Pain (4 - 6), Severe Pain (7 - 10)  HYDROmorphone  Injectable 1 milliGRAM(s) IV Push two times a day PRN wound care  ibuprofen  Tablet. 400 milliGRAM(s) Oral every 6 hours PRN Temp greater or equal to 38C (100.4F), Mild Pain (1 - 3), Moderate Pain (4 - 6), Severe Pain (7 - 10)  ondansetron Injectable 4 milliGRAM(s) IV Push every 6 hours PRN Nausea  oxyCODONE    IR 10 milliGRAM(s) Oral every 6 hours PRN Severe Pain (7 - 10)  oxyCODONE    IR 5 milliGRAM(s) Oral every 6 hours PRN Severe Pain (7 - 10)      Physical exam:   CONSTITUTIONAL: Well groomed, no apparent distress  RESP: No respiratory distress  CV: RRR  SKIN: Approximately 15cm x 6cm full thickness venous stasis ulcer present on posterior distal LLE with granulation tissue, yellow eschar, serosanguinous drainage, and edema  PSYCH: Appropriate insight/judgment; A+O x 3, mood and affect appropriate          Labs:  Culture - Fungal, Other (02.09.24 @ 09:20)    Specimen Source: .Other None   Culture Results:   Testing in progress    Culture - Acid Fast - Other w/Smear (02.09.24 @ 09:20)    Specimen Source: .Other None   Acid Fast Bacilli Smear:   No acid-fast bacilli seen by fluorochrome stain  Specimen was sent on a swab.  Swabs are not recommended  for optimal recovery of Mycobacteria from culture and may be falsely  negative.             Culture - Surgical Swab (02.09.24 @ 09:20)    Specimen Source: .Surgical Swab None   Culture Results:   Culture yields >4 types of aerobic and/or anaerobic bacteria  Call client services within 7 days if further workup is clinically  indicated.  Few Citrobacter freundii Susceptibility to follow.  Moderate Pseudomonas aeruginosa Susceptibility to follow.  Few Staphylococcus epidermidis  Few Enterococcus faecalis    Culture - Surgical Swab (02.09.24 @ 09:20)    Specimen Source: .Surgical Swab None   Culture Results:   Moderate Enterococcus avium  Few Enterococcus faecalis  Few Citrobacter freundii  Few Pseudomonas aeruginosa                              10.8   7.94  )-----------( 228      ( 10 Feb 2024 11:01 )             35.0       02-10    143  |  103  |  14  ----------------------------<  119<H>  4.7   |  28  |  0.8    Ca    9.1      10 Feb 2024 11:01  Phos  4.2     02-10  Mg     2.0     02-10                         HPI  Patient is a 48y old  Female who presents with a chief complaint of LLE wound management (10 Feb 2024 12:17). Her wound was debrided in the OR on 2/9 and she was admitted for further debridement in the OR and possible BTM placement on 2/12    Interval  No acute events overnight.    ICU Vital Signs Last 24 Hrs  T(C): 35.8 (11 Feb 2024 08:00), Max: 36.3 (10 Feb 2024 15:51)  T(F): 96.4 (11 Feb 2024 08:00), Max: 97.3 (10 Feb 2024 15:51)  HR: 95 (11 Feb 2024 08:00) (80 - 107)  BP: 108/65 (11 Feb 2024 08:00) (93/57 - 129/89)  BP(mean): 84 (11 Feb 2024 08:00) (70 - 104)  ABP: --  ABP(mean): --  RR: 18 (11 Feb 2024 08:00) (18 - 18)  SpO2: 97% (11 Feb 2024 08:00) (94% - 97%)    O2 Parameters below as of 11 Feb 2024 08:00  Patient On (Oxygen Delivery Method): room air          I&O's Summary    09 Feb 2024 07:01  -  10 Feb 2024 07:00  --------------------------------------------------------  IN: 0 mL / OUT: 1800 mL / NET: -1800 mL        Meds:  MEDICATIONS  (STANDING):  apixaban 5 milliGRAM(s) Oral two times a day  atorvastatin 80 milliGRAM(s) Oral at bedtime  buMETAnide 2 milliGRAM(s) Oral daily  chlorhexidine 4% Liquid 1 Application(s) Topical <User Schedule>  diltiazem    milliGRAM(s) Oral daily  gentamicin 0.1% Ointment 1 Application(s) Topical two times a day  influenza   Vaccine 0.5 milliLiter(s) IntraMuscular once  metoprolol succinate  milliGRAM(s) Oral daily  metoprolol succinate ER 50 milliGRAM(s) Oral at bedtime  pantoprazole    Tablet 40 milliGRAM(s) Oral before breakfast  piperacillin/tazobactam IVPB.. 3.375 Gram(s) IV Intermittent every 6 hours    MEDICATIONS  (PRN):  Culture - Acid Fast - Other w/Smear (02.09.24 @ 09:20)    Specimen Source: .Other None   Acid Fast Bacilli Smear:   No acid-fast bacilli seen by fluorochrome stain  Specimen was sent on a swab.  Swabs are not recommended  for optimal recovery of Mycobacteria from culture and may be falsely  negative.    acetaminophen     Tablet .. 650 milliGRAM(s) Oral every 6 hours PRN Temp greater or equal to 38C (100.4F), Mild Pain (1 - 3), Moderate Pain (4 - 6), Severe Pain (7 - 10)  HYDROmorphone  Injectable 1 milliGRAM(s) IV Push two times a day PRN wound care  ibuprofen  Tablet. 400 milliGRAM(s) Oral every 6 hours PRN Temp greater or equal to 38C (100.4F), Mild Pain (1 - 3), Moderate Pain (4 - 6), Severe Pain (7 - 10)  ondansetron Injectable 4 milliGRAM(s) IV Push every 6 hours PRN Nausea  oxyCODONE    IR 10 milliGRAM(s) Oral every 6 hours PRN Severe Pain (7 - 10)  oxyCODONE    IR 5 milliGRAM(s) Oral every 6 hours PRN Severe Pain (7 - 10)      Labs:  Culture - Fungal, Other (02.09.24 @ 09:20)    Specimen Source: .Other None   Culture Results:   Testing in progress    Culture - Acid Fast - Other w/Smear (02.09.24 @ 09:20)    Specimen Source: .Other None   Acid Fast Bacilli Smear:   No acid-fast bacilli seen by fluorochrome stain  Specimen was sent on a swab.  Swabs are not recommended  for optimal recovery of Mycobacteria from culture and may be falsely  negative.             Culture - Surgical Swab (02.09.24 @ 09:20)    Specimen Source: .Surgical Swab None   Culture Results:   Culture yields >4 types of aerobic and/or anaerobic bacteria  Call client services within 7 days if further workup is clinically  indicated.  Few Citrobacter freundii Susceptibility to follow.  Moderate Pseudomonas aeruginosa Susceptibility to follow.  Few Staphylococcus epidermidis  Few Enterococcus faecalis    Culture - Surgical Swab (02.09.24 @ 09:20)    Specimen Source: .Surgical Swab None   Culture Results:   Moderate Enterococcus avium  Few Enterococcus faecalis  Few Citrobacter freundii  Few Pseudomonas aeruginosa      Labs:                        10.8   7.94  )-----------( 228      ( 10 Feb 2024 11:01 )             35.0       02-10    143  |  103  |  14  ----------------------------<  119<H>  4.7   |  28  |  0.8    Ca    9.1      10 Feb 2024 11:01  Phos  4.2     02-10  Mg     2.0     02-10      Physical exam:   CONSTITUTIONAL: Well groomed, no apparent distress  RESP: No respiratory distress  CV: RRR  SKIN: Approximately 15cm x 6cm full thickness venous stasis ulcer present on posterior distal LLE with granulation tissue, yellow eschar, serosanguinous drainage, and edema  PSYCH: Appropriate insight/judgment; A+O x 3, mood and affect appropriate

## 2024-02-11 NOTE — PROGRESS NOTE ADULT - ATTENDING COMMENTS
OR tomorrow for further debridement and possible BTM placement  Pre op labs ordered  NPO at MN  cont wound care  Cont IV antibx  DVT GI Prophylaxis  Pain control  OT/PT  F/u Cx  Elevation

## 2024-02-11 NOTE — PROGRESS NOTE ADULT - ASSESSMENT
Ms. Snow is a pleasant 49 yo F POD 2 s/p luis antonio LLE wound.     Plan:  OR tomorrow for further debridement and possible BTM placement  Pre op labs ordered  NPO at MN  cont wound care  Cont IV antibx  DVT GI Prophylaxis  Pain control  OT/PT  F/u Cx  Elevation

## 2024-02-12 LAB
-  AMPICILLIN: SIGNIFICANT CHANGE UP
-  AZTREONAM: SIGNIFICANT CHANGE UP
-  CEFEPIME: SIGNIFICANT CHANGE UP
-  CEFTAZIDIME/AVIBACTAM: SIGNIFICANT CHANGE UP
-  CEFTAZIDIME: SIGNIFICANT CHANGE UP
-  CEFTOLOZANE/TAZOBACTAM: SIGNIFICANT CHANGE UP
-  CIPROFLOXACIN: SIGNIFICANT CHANGE UP
-  IMIPENEM: SIGNIFICANT CHANGE UP
-  LEVOFLOXACIN: SIGNIFICANT CHANGE UP
-  MEROPENEM: SIGNIFICANT CHANGE UP
-  PIPERACILLIN/TAZOBACTAM: SIGNIFICANT CHANGE UP
-  VANCOMYCIN: SIGNIFICANT CHANGE UP
BLANDM BLD POS QL PROBE: SIGNIFICANT CHANGE UP
CULTURE RESULTS: ABNORMAL
METHOD TYPE: SIGNIFICANT CHANGE UP
ORGANISM # SPEC MICROSCOPIC CNT: ABNORMAL
ORGANISM # SPEC MICROSCOPIC CNT: SIGNIFICANT CHANGE UP

## 2024-02-12 PROCEDURE — 15274 SKN SUB GRFT T/A/L CHILD ADD: CPT | Mod: 59

## 2024-02-12 PROCEDURE — 15273 SKIN SUB GRFT T/ARM/LG CHILD: CPT

## 2024-02-12 PROCEDURE — 99232 SBSQ HOSP IP/OBS MODERATE 35: CPT | Mod: 25

## 2024-02-12 RX ORDER — PANTOPRAZOLE SODIUM 20 MG/1
40 TABLET, DELAYED RELEASE ORAL
Refills: 0 | Status: DISCONTINUED | OUTPATIENT
Start: 2024-02-12 | End: 2024-02-15

## 2024-02-12 RX ORDER — OXYCODONE HYDROCHLORIDE 5 MG/1
5 TABLET ORAL EVERY 6 HOURS
Refills: 0 | Status: DISCONTINUED | OUTPATIENT
Start: 2024-02-12 | End: 2024-02-13

## 2024-02-12 RX ORDER — METOPROLOL TARTRATE 50 MG
100 TABLET ORAL DAILY
Refills: 0 | Status: DISCONTINUED | OUTPATIENT
Start: 2024-02-12 | End: 2024-02-15

## 2024-02-12 RX ORDER — BUMETANIDE 0.25 MG/ML
2 INJECTION INTRAMUSCULAR; INTRAVENOUS DAILY
Refills: 0 | Status: DISCONTINUED | OUTPATIENT
Start: 2024-02-12 | End: 2024-02-15

## 2024-02-12 RX ORDER — SODIUM CHLORIDE 9 MG/ML
1000 INJECTION, SOLUTION INTRAVENOUS
Refills: 0 | Status: DISCONTINUED | OUTPATIENT
Start: 2024-02-12 | End: 2024-02-12

## 2024-02-12 RX ORDER — HYDROMORPHONE HYDROCHLORIDE 2 MG/ML
1 INJECTION INTRAMUSCULAR; INTRAVENOUS; SUBCUTANEOUS
Refills: 0 | Status: DISCONTINUED | OUTPATIENT
Start: 2024-02-12 | End: 2024-02-12

## 2024-02-12 RX ORDER — HYDROMORPHONE HYDROCHLORIDE 2 MG/ML
1 INJECTION INTRAMUSCULAR; INTRAVENOUS; SUBCUTANEOUS
Refills: 0 | Status: DISCONTINUED | OUTPATIENT
Start: 2024-02-12 | End: 2024-02-15

## 2024-02-12 RX ORDER — HYDROMORPHONE HYDROCHLORIDE 2 MG/ML
0.5 INJECTION INTRAMUSCULAR; INTRAVENOUS; SUBCUTANEOUS
Refills: 0 | Status: DISCONTINUED | OUTPATIENT
Start: 2024-02-12 | End: 2024-02-12

## 2024-02-12 RX ORDER — ACETAMINOPHEN 500 MG
650 TABLET ORAL EVERY 6 HOURS
Refills: 0 | Status: DISCONTINUED | OUTPATIENT
Start: 2024-02-12 | End: 2024-02-13

## 2024-02-12 RX ORDER — METOPROLOL TARTRATE 50 MG
50 TABLET ORAL AT BEDTIME
Refills: 0 | Status: DISCONTINUED | OUTPATIENT
Start: 2024-02-12 | End: 2024-02-15

## 2024-02-12 RX ORDER — ONDANSETRON 8 MG/1
4 TABLET, FILM COATED ORAL EVERY 6 HOURS
Refills: 0 | Status: DISCONTINUED | OUTPATIENT
Start: 2024-02-12 | End: 2024-02-15

## 2024-02-12 RX ORDER — POLYETHYLENE GLYCOL 3350 17 G/17G
17 POWDER, FOR SOLUTION ORAL DAILY
Refills: 0 | Status: DISCONTINUED | OUTPATIENT
Start: 2024-02-12 | End: 2024-02-15

## 2024-02-12 RX ORDER — APIXABAN 2.5 MG/1
5 TABLET, FILM COATED ORAL EVERY 12 HOURS
Refills: 0 | Status: DISCONTINUED | OUTPATIENT
Start: 2024-02-12 | End: 2024-02-15

## 2024-02-12 RX ORDER — DILTIAZEM HCL 120 MG
240 CAPSULE, EXT RELEASE 24 HR ORAL DAILY
Refills: 0 | Status: DISCONTINUED | OUTPATIENT
Start: 2024-02-12 | End: 2024-02-15

## 2024-02-12 RX ORDER — ONDANSETRON 8 MG/1
4 TABLET, FILM COATED ORAL ONCE
Refills: 0 | Status: DISCONTINUED | OUTPATIENT
Start: 2024-02-12 | End: 2024-02-12

## 2024-02-12 RX ORDER — IBUPROFEN 200 MG
400 TABLET ORAL EVERY 6 HOURS
Refills: 0 | Status: DISCONTINUED | OUTPATIENT
Start: 2024-02-12 | End: 2024-02-13

## 2024-02-12 RX ORDER — PIPERACILLIN AND TAZOBACTAM 4; .5 G/20ML; G/20ML
3.38 INJECTION, POWDER, LYOPHILIZED, FOR SOLUTION INTRAVENOUS EVERY 8 HOURS
Refills: 0 | Status: DISCONTINUED | OUTPATIENT
Start: 2024-02-12 | End: 2024-02-13

## 2024-02-12 RX ORDER — OXYCODONE HYDROCHLORIDE 5 MG/1
10 TABLET ORAL EVERY 6 HOURS
Refills: 0 | Status: DISCONTINUED | OUTPATIENT
Start: 2024-02-12 | End: 2024-02-15

## 2024-02-12 RX ORDER — ATORVASTATIN CALCIUM 80 MG/1
80 TABLET, FILM COATED ORAL AT BEDTIME
Refills: 0 | Status: DISCONTINUED | OUTPATIENT
Start: 2024-02-12 | End: 2024-02-15

## 2024-02-12 RX ORDER — CHLORHEXIDINE GLUCONATE 213 G/1000ML
1 SOLUTION TOPICAL
Refills: 0 | Status: DISCONTINUED | OUTPATIENT
Start: 2024-02-12 | End: 2024-02-15

## 2024-02-12 RX ADMIN — CHLORHEXIDINE GLUCONATE 1 APPLICATION(S): 213 SOLUTION TOPICAL at 06:33

## 2024-02-12 RX ADMIN — HYDROMORPHONE HYDROCHLORIDE 0.5 MILLIGRAM(S): 2 INJECTION INTRAMUSCULAR; INTRAVENOUS; SUBCUTANEOUS at 12:30

## 2024-02-12 RX ADMIN — BUMETANIDE 2 MILLIGRAM(S): 0.25 INJECTION INTRAMUSCULAR; INTRAVENOUS at 06:33

## 2024-02-12 RX ADMIN — POLYETHYLENE GLYCOL 3350 17 GRAM(S): 17 POWDER, FOR SOLUTION ORAL at 13:38

## 2024-02-12 RX ADMIN — OXYCODONE HYDROCHLORIDE 5 MILLIGRAM(S): 5 TABLET ORAL at 22:31

## 2024-02-12 RX ADMIN — HYDROMORPHONE HYDROCHLORIDE 0.5 MILLIGRAM(S): 2 INJECTION INTRAMUSCULAR; INTRAVENOUS; SUBCUTANEOUS at 12:00

## 2024-02-12 RX ADMIN — Medication 100 MILLIGRAM(S): at 06:33

## 2024-02-12 RX ADMIN — ATORVASTATIN CALCIUM 80 MILLIGRAM(S): 80 TABLET, FILM COATED ORAL at 22:32

## 2024-02-12 RX ADMIN — APIXABAN 5 MILLIGRAM(S): 2.5 TABLET, FILM COATED ORAL at 17:19

## 2024-02-12 RX ADMIN — OXYCODONE HYDROCHLORIDE 10 MILLIGRAM(S): 5 TABLET ORAL at 06:51

## 2024-02-12 RX ADMIN — Medication 240 MILLIGRAM(S): at 06:32

## 2024-02-12 RX ADMIN — PANTOPRAZOLE SODIUM 40 MILLIGRAM(S): 20 TABLET, DELAYED RELEASE ORAL at 06:34

## 2024-02-12 RX ADMIN — PIPERACILLIN AND TAZOBACTAM 25 GRAM(S): 4; .5 INJECTION, POWDER, LYOPHILIZED, FOR SOLUTION INTRAVENOUS at 22:32

## 2024-02-12 RX ADMIN — Medication 50 MILLIGRAM(S): at 22:32

## 2024-02-12 RX ADMIN — OXYCODONE HYDROCHLORIDE 5 MILLIGRAM(S): 5 TABLET ORAL at 23:21

## 2024-02-12 RX ADMIN — PIPERACILLIN AND TAZOBACTAM 25 GRAM(S): 4; .5 INJECTION, POWDER, LYOPHILIZED, FOR SOLUTION INTRAVENOUS at 06:33

## 2024-02-12 RX ADMIN — PIPERACILLIN AND TAZOBACTAM 25 GRAM(S): 4; .5 INJECTION, POWDER, LYOPHILIZED, FOR SOLUTION INTRAVENOUS at 13:37

## 2024-02-12 RX ADMIN — OXYCODONE HYDROCHLORIDE 10 MILLIGRAM(S): 5 TABLET ORAL at 17:51

## 2024-02-12 NOTE — BRIEF OPERATIVE NOTE - NSICDXBRIEFPROCEDURE_GEN_ALL_CORE_FT
Department of Anesthesiology  Postprocedure Note    Patient: Goldy Connelly  MRN: [id number]  YOB: 1959  Date of evaluation: 1/12/2024    Procedure Summary       Date: 01/12/24 Room / Location: [facility] ENDO 02 / [facility] ENDOSCOPY    Anesthesia Start: 0920 Anesthesia Stop: 1005    Procedure: COLONOSCOPY AND RECTAL EUS WITH FIDUCIAL MARKER INSERTION Diagnosis:       Colon cancer screening      Prostate cancer (HCC)      (Colon cancer screening [Z12.11])      (Prostate cancer (HCC) [C61])    Surgeons: Omkar Watts MD Responsible Provider: Carlos Mares MD    Anesthesia Type: MAC ASA Status: 2            Anesthesia Type: MAC    Michele Phase I: Michele Score: 10    Michele Phase II: Michele Score: 10    Anesthesia Post Evaluation    Patient location during evaluation: bedside  Patient participation: complete - patient participated  Level of consciousness: awake  Airway patency: patent  Nausea & Vomiting: no nausea  Cardiovascular status: blood pressure returned to baseline  Respiratory status: acceptable  Hydration status: euvolemic  Pain management: adequate    There were no known notable events for this encounter.  
PROCEDURES:  Selective debridement 09-Feb-2024 12:31:39 excisional debridement with scalpel left lower leg Jorge L Doyle  Selective debridement 12-Feb-2024 12:25:14 excisional debridement with scalpel , Novosorb BTM , wound vac left lower leg Jorge L Doyle  
PROCEDURES:  Selective debridement 09-Feb-2024 12:31:39 excisional debridement with scalpel left lower leg Jorge L Doyle

## 2024-02-12 NOTE — CHART NOTE - NSCHARTNOTEFT_GEN_A_CORE
PACU ANESTHESIA ADMISSION NOTE      Procedure: Selective debridement  excisional debridement with scalpel left lower leg      Post op diagnosis:  History of venous stasis ulcer of lower leg        ____  Intubated  TV:______       Rate: ______      FiO2: ______    _x___  Patent Airway    _x___  Full return of protective reflexes    _x___  Full recovery from anesthesia / back to baseline status    Vitals:    See anesthesia record      Mental Status:  _x___ Awake   _____ Alert   _____ Drowsy   _____ Sedated    Nausea/Vomiting:  _x___  NO       ______Yes,   See Post - Op Orders         Pain Scale (0-10):  __0___    Treatment: _x___ None    ____ See Post - Op/PCA Orders    Post - Operative Fluids:   __x__ Oral   ____ See Post - Op Orders    Plan: Discharge:   ____Home       _____Floor     _____Critical Care    _____  Other:__x__Burn_____________    Comments:  No anesthesia issues or complications noted.  Discharge when criteria met.

## 2024-02-12 NOTE — PRE-ANESTHESIA EVALUATION ADULT - NSRADCARDRESULTSFT_GEN_ALL_CORE
Ventricular Rate 95 BPM    Atrial Rate 95 BPM    P-R Interval 190 ms    QRS Duration 110 ms    Q-T Interval 394 ms    QTC Calculation(Bazett) 495 ms    P Axis -8 degrees    R Axis 84 degrees    T Axis 10 degrees    Diagnosis Line Sinus rhythm with occasional Premature ventricular complexes  Incomplete right bundle branch block  Prolonged QT  Abnormal ECG

## 2024-02-12 NOTE — PRE-OP CHECKLIST - SURGICAL CONSENT
done
Instructions (Optional): Right distal proximal arm 4mm punch 4 suture r/o herpes zoster vs bullous phemphegious vs Mastocytosis vs dermatitis unspecified \\nLeft axilla 4 mm punch 4 suture r/o herpes zoster vs bullous phemphegious vs Mastocytosis vs dermatitis unspecified
Introduction Text (Please End With A Colon): *
Detail Level: Detailed
Procedure To Be Performed At Next Visit: Biopsy by punch method
Scheduling Instructions (Optional): immuno-fluorescence Bx x2

## 2024-02-12 NOTE — PRE-ANESTHESIA EVALUATION ADULT - NS MD HP INPLANTS MED DEV
Metronic 12/23/Automatic Implantable Cardioverter Defibrillator
Metronic 12/23/Automatic Implantable Cardioverter Defibrillator

## 2024-02-12 NOTE — BRIEF OPERATIVE NOTE - NSICDXBRIEFPOSTOP_GEN_ALL_CORE_FT
POST-OP DIAGNOSIS:  History of venous stasis ulcer of lower leg 09-Feb-2024 12:32:36  Jorge L Doyle  
POST-OP DIAGNOSIS:  History of venous stasis ulcer of lower leg 09-Feb-2024 12:32:36  Jorge L Doyle

## 2024-02-12 NOTE — BRIEF OPERATIVE NOTE - NSICDXBRIEFPREOP_GEN_ALL_CORE_FT
PRE-OP DIAGNOSIS:  Venous stasis ulcer without varicose veins 09-Feb-2024 12:32:20  Jorge L Doyle  
PRE-OP DIAGNOSIS:  Venous stasis ulcer without varicose veins 09-Feb-2024 12:32:20  Jorge L Doyle

## 2024-02-12 NOTE — PROGRESS NOTE ADULT - ASSESSMENT
47 yo female, former smoker, with PMH of HTN, HLD,  h/o LE DVT, pHTN, KANDICE on CPAP,  A-Fib s/p ablation 6 yrs ago @  [facility] on Eliquis, tachybrady syndrome, s/p left bundle area pacing on 8/30/2023 with upgradt to dual chamber ICD and extraction of pacemakrer lead on 12/2023  # Sinus pause of 6 seconds, bradycardia to 20s  #A-flutter w/ RVR  #s/p ablation hx chronic A-Fib on Eliquis  - pacemaker placement today  - f/u preop labs  - hold eliquis for now; can resume after procedure  - will hold metoprolol and sildenafil for now, resume as per Cardiology  - will hold of Bumex for the procedure to avoid hypotension, resume post op  - trops negative  - mildly elevated BNP 1458    #Left leg wound with drainage  -f/u Dr. Doyle  -burn consult for eval  - plan for debridement    # pulmonary HTN  - Moderate Pulmo HTN  - c/w sildenafil for Raynaud's and Pul HTN    #Raynaud's  -on sildenafil,  - can hold for now, resume after the procedure    #HTN, uncontrolled  -on metoprolol succinate 100 mg, and Bumex 2 mg QD  -can resume after the procedure    #KANDICE  -continue CPAP  47 yo female, former smoker, with PMH of HTN, HLD,  h/o LE DVT, pHTN, KANDICE on CPAP,  A-Fib s/p ablation 6 yrs ago @  [facility] on Eliquis, tachybrady syndrome, s/p left bundle area pacing on 8/2023 with upgrade to dual chamber ICD and extraction of pacemaker lead 12/2023.    LLE chronic venous stasis wound  - Wcx 2/9 Few Citrobacter freundii Moderate Pseudomonas aeruginosa Few Enterococcus faecalis Few Staphylococcus epidermidis   - OR today for possible BTM placement  - LWC until BTM placement - gent/xeroform  - IV abx per ID - zosyn  - AC held preop  - DC planning once BTM placed   - Pain control       CARD  # hx chronic A-Fib on Eliquis s/p ablation 3yo [facility]  #h/o tachybrady syndrome s/p left bundle area pacing on 8/2023 with upgrade to dual chamber ICD and extraction of pacemaker lead in 12/2023.  #h/o htn  -r/s eliquis post op  - continue home BP meds - diltiazem & metoprolol   - c/w Bumex   - c/w lipitor        # pulmonary HTN  - Moderate Pulmo HTN  - c/w sildenafil for Raynaud's and Pul HTN    #Raynaud's  -on sildenafil,  - can hold for now, resume after the procedure      #KANDICE  -continue CPAP     - f/u  for DC planning

## 2024-02-12 NOTE — PRE-OP CHECKLIST - SITE MARKED BY SURGEON
Chief complaint:   Chief Complaint   Patient presents with   • Ear Problem     Right ear pain and drainage started this morning.       Vitals:  Visit Vitals  Pulse 126   Temp 98.8 °F (37.1 °C) (Temporal Artery)   Resp 18   Ht 2' 10\" (0.864 m)   Wt 11.9 kg   BMI 15.89 kg/m²       HISTORY OF PRESENT ILLNESS     1 year old male toddler presents with right ear discharge. Mom is not sure when symptoms started but she first noticed ear drainage today. Mom and patient denies any foreign body placement into ear canal. Mom denies any recent trauma, no swimming and no prior history of similar symptoms. Ear drainage is yellow green in color. Mom denies noting any fever, balance disturbances, vomiting, respiratory distress, abdominal pain and/or rashes.   Patient did go to ED yesterday because mom was concerned that he swallowed a foreign body from his piggy bank. Clinical workup in ED was negative for foreign body. Patient appetite, energy and sleep levels are all at baseline.          Other significant problems:  There are no active problems to display for this patient.      PAST MEDICAL, FAMILY AND SOCIAL HISTORY     Medications:  Current Outpatient Prescriptions   Medication   • neomycin-polymyxin-HC 1 % otic solution   • ibuprofen (MOTRIN) 100 MG/5ML suspension     No current facility-administered medications for this visit.        Allergies:  ALLERGIES:  No Known Allergies    Past Medical  History/Surgeries:  No past medical history on file.    Past Surgical History:   Procedure Laterality Date   • Circumcision, primary         Family History:  No family history on file.    Social History:  Social History   Substance Use Topics   • Smoking status: Never Smoker   • Smokeless tobacco: Never Used   • Alcohol use Not on file       REVIEW OF SYSTEMS     Review of Systems    Please see History of Present Illness.    PHYSICAL EXAM     Physical Exam   Constitutional: He appears well-developed and well-nourished. He is active and  playful. He does not appear ill. No distress.   HENT:   Head: Normocephalic and atraumatic.   Right Ear: Tympanic membrane and canal normal. There is drainage (yellow green thick), swelling and tenderness. There is pain on movement.   Left Ear: Tympanic membrane, external ear and canal normal. No swelling or tenderness. Tympanic membrane is normal.   Ears:    Nose: Nose normal. No rhinorrhea, nasal discharge or congestion.   Mouth/Throat: Mucous membranes are moist. Oropharynx is clear.   Patient did not tolerate nor cooperate with oral evaluation today, mom preferred to defer oral exam due to patient    Eyes: Conjunctivae are normal. Pupils are equal, round, and reactive to light.   Neck: Normal range of motion. Neck supple.   Pulmonary/Chest: Effort normal.   Musculoskeletal: Normal range of motion.   Neurological: He is alert.   Skin: Skin is warm and moist. No rash noted. He is not diaphoretic. No pallor.   Nursing note and vitals reviewed.      ASSESSMENT/PLAN     Eduardo was seen today for ear problem.    Diagnoses and all orders for this visit:    Acute diffuse otitis externa of right ear  -     neomycin-polymyxin-HC 1 % otic solution; Place 3 drops in ear(s) 3 times daily for 10 days. Use in affected ear(s)  -The patient mother has been properly counseled about the above suspected diagnosis and provided home care tips for supportive therapy.   All questions were answered and the patient mother was in agreement with the diagnosis, treatment and discharge plan. Patient mother understood and knows when and/or if to seek immediate medical attention (if symptoms worsen or do not improve, seek immediate medical attention or follow up with your Primary Care Physician in 3-5 days).  -Patient should benefit from re-evaluation in 2 to 3 days to reassess if there is a possible foreign body in right ear canal, patient given otic antibiotic to help with initial ear canal swelling.    -See the After Visit Summary for  additional instructions, follow-up plans and/or emergency room precautions discussed with the patient.    Patient (or parent/guardian if applicable) was in agreement with treatment and discharge plan, appropriately stable at time of discharge from urgent care clinic.       yes

## 2024-02-12 NOTE — PRE-ANESTHESIA EVALUATION ADULT - NSANTHPMHFT_GEN_ALL_CORE
Patient is a 49 yo female who presents for elective debridement and possible BTM placement to the wound on her LLE due to a stasis ulcer x 2 years.   PAST MEDICAL HISTORY:  Afib     Back pain     DVT, lower extremity     H/O Raynaud's syndrome     H/O ventricular tachycardia     HTN (hypertension)     Lymphedema of leg     Morbidly obese     KANDICE on CPAP.     PAST SURGICAL HISTORY:  AICD (automatic cardioverter/defibrillator) present     H/O prior ablation treatment     History of laparoscopic cholecystectomy.

## 2024-02-12 NOTE — PROGRESS NOTE ADULT - SUBJECTIVE AND OBJECTIVE BOX
48y old  Female who presents with a chief complaint of LLE wound management (10 Feb 2024 12:17). Her wound was debrided in the OR on 2/9 and she was admitted for further debridement in the OR and possible BTM placement on 2/12    AM rounds     Pt: no complaints  No acute events o/n  OR planned for today     Vital Signs Last 24 Hrs  T(C): 36.8 (12 Feb 2024 10:19), Max: 36.8 (12 Feb 2024 09:37)  T(F): 98.2 (12 Feb 2024 09:47), Max: 98.2 (12 Feb 2024 09:37)  HR: 61 (12 Feb 2024 10:19) (61 - 90)  BP: 117/68 (12 Feb 2024 10:19) (108/71 - 118/73)  BP(mean): 85 (12 Feb 2024 10:19) (85 - 85)  RR: 18 (12 Feb 2024 10:19) (18 - 18)  SpO2: 96% (12 Feb 2024 10:19) (96% - 98%)    Parameters below as of 12 Feb 2024 10:19    O2 Flow (L/min): 2          I&O's Summary      02-11    142  |  102  |  16  ----------------------------<  122<H>  4.2   |  26  |  0.8    Ca    8.8      11 Feb 2024 11:26  Phos  4.3     02-11  Mg     1.9     02-11                            10.6   8.42  )-----------( 213      ( 11 Feb 2024 11:26 )             34.3         EXAM:   CONSTITUTIONAL: NAD  RESP: No respiratory distress  SKIN: Approximately 15cm x 6cm full thickness venous stasis ulcer present on posterior distal LLE with granulation tissue, yellow eschar, serosanguinous drainage, and edema  PSYCH: Appropriate insight/judgment; A+O x 3, mood and affect appropriate            48y old  Female who presents with a chief complaint of LLE wound management . Her wound was debrided in the OR on 2/9 and she was admitted for further debridement in the OR and possible BTM placement     AM rounds     Pt: no complaints  No acute events o/n  OR planned for today     Vital Signs Last 24 Hrs  T(C): 36.8 (12 Feb 2024 10:19), Max: 36.8 (12 Feb 2024 09:37)  T(F): 98.2 (12 Feb 2024 09:47), Max: 98.2 (12 Feb 2024 09:37)  HR: 61 (12 Feb 2024 10:19) (61 - 90)  BP: 117/68 (12 Feb 2024 10:19) (108/71 - 118/73)  BP(mean): 85 (12 Feb 2024 10:19) (85 - 85)  RR: 18 (12 Feb 2024 10:19) (18 - 18)  SpO2: 96% (12 Feb 2024 10:19) (96% - 98%)    Parameters below as of 12 Feb 2024 10:19    O2 Flow (L/min): 2          I&O's Summary      02-11    142  |  102  |  16  ----------------------------<  122<H>  4.2   |  26  |  0.8    Ca    8.8      11 Feb 2024 11:26  Phos  4.3     02-11  Mg     1.9     02-11                            10.6   8.42  )-----------( 213      ( 11 Feb 2024 11:26 )             34.3         EXAM:   CONSTITUTIONAL: NAD  RESP: No respiratory distress  SKIN: Approximately 15cm x 6cm full thickness venous stasis ulcer present on posterior distal LLE with granulation tissue, yellow eschar, serosanguinous drainage, and edema  PSYCH: Appropriate insight/judgment; A+O x 3, mood and affect appropriate

## 2024-02-13 ENCOUNTER — TRANSCRIPTION ENCOUNTER (OUTPATIENT)
Age: 49
End: 2024-02-13

## 2024-02-13 LAB
ANION GAP SERPL CALC-SCNC: 12 MMOL/L — SIGNIFICANT CHANGE UP (ref 7–14)
BASOPHILS # BLD AUTO: 0.02 K/UL — SIGNIFICANT CHANGE UP (ref 0–0.2)
BASOPHILS NFR BLD AUTO: 0.2 % — SIGNIFICANT CHANGE UP (ref 0–1)
BUN SERPL-MCNC: 17 MG/DL — SIGNIFICANT CHANGE UP (ref 10–20)
CALCIUM SERPL-MCNC: 9 MG/DL — SIGNIFICANT CHANGE UP (ref 8.4–10.5)
CHLORIDE SERPL-SCNC: 101 MMOL/L — SIGNIFICANT CHANGE UP (ref 98–110)
CO2 SERPL-SCNC: 28 MMOL/L — SIGNIFICANT CHANGE UP (ref 17–32)
CREAT SERPL-MCNC: 0.8 MG/DL — SIGNIFICANT CHANGE UP (ref 0.7–1.5)
EGFR: 89 ML/MIN/1.73M2 — SIGNIFICANT CHANGE UP
EOSINOPHIL # BLD AUTO: 0.02 K/UL — SIGNIFICANT CHANGE UP (ref 0–0.7)
EOSINOPHIL NFR BLD AUTO: 0.2 % — SIGNIFICANT CHANGE UP (ref 0–8)
GLUCOSE SERPL-MCNC: 124 MG/DL — HIGH (ref 70–99)
HCT VFR BLD CALC: 34.5 % — LOW (ref 37–47)
HGB BLD-MCNC: 10.6 G/DL — LOW (ref 12–16)
IMM GRANULOCYTES NFR BLD AUTO: 0.6 % — HIGH (ref 0.1–0.3)
LYMPHOCYTES # BLD AUTO: 1.08 K/UL — LOW (ref 1.2–3.4)
LYMPHOCYTES # BLD AUTO: 11.6 % — LOW (ref 20.5–51.1)
MAGNESIUM SERPL-MCNC: 2 MG/DL — SIGNIFICANT CHANGE UP (ref 1.8–2.4)
MCHC RBC-ENTMCNC: 27.9 PG — SIGNIFICANT CHANGE UP (ref 27–31)
MCHC RBC-ENTMCNC: 30.7 G/DL — LOW (ref 32–37)
MCV RBC AUTO: 90.8 FL — SIGNIFICANT CHANGE UP (ref 81–99)
MONOCYTES # BLD AUTO: 1.31 K/UL — HIGH (ref 0.1–0.6)
MONOCYTES NFR BLD AUTO: 14.1 % — HIGH (ref 1.7–9.3)
NEUTROPHILS # BLD AUTO: 6.79 K/UL — HIGH (ref 1.4–6.5)
NEUTROPHILS NFR BLD AUTO: 73.3 % — SIGNIFICANT CHANGE UP (ref 42.2–75.2)
NRBC # BLD: 0 /100 WBCS — SIGNIFICANT CHANGE UP (ref 0–0)
PHOSPHATE SERPL-MCNC: 3.6 MG/DL — SIGNIFICANT CHANGE UP (ref 2.1–4.9)
PLATELET # BLD AUTO: 261 K/UL — SIGNIFICANT CHANGE UP (ref 130–400)
PMV BLD: 9.7 FL — SIGNIFICANT CHANGE UP (ref 7.4–10.4)
POTASSIUM SERPL-MCNC: 4 MMOL/L — SIGNIFICANT CHANGE UP (ref 3.5–5)
POTASSIUM SERPL-SCNC: 4 MMOL/L — SIGNIFICANT CHANGE UP (ref 3.5–5)
RBC # BLD: 3.8 M/UL — LOW (ref 4.2–5.4)
RBC # FLD: 17.5 % — HIGH (ref 11.5–14.5)
SODIUM SERPL-SCNC: 141 MMOL/L — SIGNIFICANT CHANGE UP (ref 135–146)
WBC # BLD: 9.28 K/UL — SIGNIFICANT CHANGE UP (ref 4.8–10.8)
WBC # FLD AUTO: 9.28 K/UL — SIGNIFICANT CHANGE UP (ref 4.8–10.8)

## 2024-02-13 PROCEDURE — 36000 PLACE NEEDLE IN VEIN: CPT

## 2024-02-13 PROCEDURE — 99232 SBSQ HOSP IP/OBS MODERATE 35: CPT

## 2024-02-13 PROCEDURE — 76937 US GUIDE VASCULAR ACCESS: CPT | Mod: 26

## 2024-02-13 RX ORDER — CEFEPIME 1 G/1
2000 INJECTION, POWDER, FOR SOLUTION INTRAMUSCULAR; INTRAVENOUS EVERY 8 HOURS
Refills: 0 | Status: DISCONTINUED | OUTPATIENT
Start: 2024-02-13 | End: 2024-02-13

## 2024-02-13 RX ORDER — OXYCODONE HYDROCHLORIDE 5 MG/1
5 TABLET ORAL EVERY 6 HOURS
Refills: 0 | Status: DISCONTINUED | OUTPATIENT
Start: 2024-02-13 | End: 2024-02-15

## 2024-02-13 RX ORDER — CEFEPIME 1 G/1
2000 INJECTION, POWDER, FOR SOLUTION INTRAMUSCULAR; INTRAVENOUS EVERY 8 HOURS
Refills: 0 | Status: DISCONTINUED | OUTPATIENT
Start: 2024-02-13 | End: 2024-02-15

## 2024-02-13 RX ORDER — IBUPROFEN 200 MG
400 TABLET ORAL EVERY 6 HOURS
Refills: 0 | Status: DISCONTINUED | OUTPATIENT
Start: 2024-02-13 | End: 2024-02-15

## 2024-02-13 RX ORDER — CEFEPIME 1 G/1
INJECTION, POWDER, FOR SOLUTION INTRAMUSCULAR; INTRAVENOUS
Refills: 0 | Status: DISCONTINUED | OUTPATIENT
Start: 2024-02-13 | End: 2024-02-13

## 2024-02-13 RX ORDER — OXYCODONE AND ACETAMINOPHEN 5; 325 MG/1; MG/1
1 TABLET ORAL ONCE
Refills: 0 | Status: DISCONTINUED | OUTPATIENT
Start: 2024-02-13 | End: 2024-02-13

## 2024-02-13 RX ORDER — CEFEPIME 1 G/1
2000 INJECTION, POWDER, FOR SOLUTION INTRAMUSCULAR; INTRAVENOUS ONCE
Refills: 0 | Status: DISCONTINUED | OUTPATIENT
Start: 2024-02-13 | End: 2024-02-13

## 2024-02-13 RX ORDER — AMOXICILLIN 250 MG/5ML
500 SUSPENSION, RECONSTITUTED, ORAL (ML) ORAL EVERY 8 HOURS
Refills: 0 | Status: DISCONTINUED | OUTPATIENT
Start: 2024-02-13 | End: 2024-02-15

## 2024-02-13 RX ORDER — ACETAMINOPHEN 500 MG
650 TABLET ORAL EVERY 6 HOURS
Refills: 0 | Status: DISCONTINUED | OUTPATIENT
Start: 2024-02-13 | End: 2024-02-15

## 2024-02-13 RX ADMIN — Medication 500 MILLIGRAM(S): at 21:19

## 2024-02-13 RX ADMIN — CHLORHEXIDINE GLUCONATE 1 APPLICATION(S): 213 SOLUTION TOPICAL at 06:36

## 2024-02-13 RX ADMIN — Medication 500 MILLIGRAM(S): at 13:18

## 2024-02-13 RX ADMIN — CEFEPIME 100 MILLIGRAM(S): 1 INJECTION, POWDER, FOR SOLUTION INTRAMUSCULAR; INTRAVENOUS at 13:18

## 2024-02-13 RX ADMIN — Medication 50 MILLIGRAM(S): at 21:19

## 2024-02-13 RX ADMIN — OXYCODONE HYDROCHLORIDE 5 MILLIGRAM(S): 5 TABLET ORAL at 16:07

## 2024-02-13 RX ADMIN — APIXABAN 5 MILLIGRAM(S): 2.5 TABLET, FILM COATED ORAL at 06:32

## 2024-02-13 RX ADMIN — BUMETANIDE 2 MILLIGRAM(S): 0.25 INJECTION INTRAMUSCULAR; INTRAVENOUS at 06:35

## 2024-02-13 RX ADMIN — APIXABAN 5 MILLIGRAM(S): 2.5 TABLET, FILM COATED ORAL at 17:09

## 2024-02-13 RX ADMIN — OXYCODONE HYDROCHLORIDE 5 MILLIGRAM(S): 5 TABLET ORAL at 15:37

## 2024-02-13 RX ADMIN — OXYCODONE HYDROCHLORIDE 5 MILLIGRAM(S): 5 TABLET ORAL at 13:21

## 2024-02-13 RX ADMIN — CEFEPIME 100 MILLIGRAM(S): 1 INJECTION, POWDER, FOR SOLUTION INTRAMUSCULAR; INTRAVENOUS at 22:06

## 2024-02-13 RX ADMIN — OXYCODONE HYDROCHLORIDE 5 MILLIGRAM(S): 5 TABLET ORAL at 07:21

## 2024-02-13 RX ADMIN — OXYCODONE HYDROCHLORIDE 10 MILLIGRAM(S): 5 TABLET ORAL at 22:07

## 2024-02-13 RX ADMIN — ATORVASTATIN CALCIUM 80 MILLIGRAM(S): 80 TABLET, FILM COATED ORAL at 21:19

## 2024-02-13 RX ADMIN — OXYCODONE HYDROCHLORIDE 5 MILLIGRAM(S): 5 TABLET ORAL at 06:33

## 2024-02-13 RX ADMIN — Medication 240 MILLIGRAM(S): at 06:35

## 2024-02-13 RX ADMIN — PANTOPRAZOLE SODIUM 40 MILLIGRAM(S): 20 TABLET, DELAYED RELEASE ORAL at 06:33

## 2024-02-13 RX ADMIN — POLYETHYLENE GLYCOL 3350 17 GRAM(S): 17 POWDER, FOR SOLUTION ORAL at 12:52

## 2024-02-13 RX ADMIN — PIPERACILLIN AND TAZOBACTAM 25 GRAM(S): 4; .5 INJECTION, POWDER, LYOPHILIZED, FOR SOLUTION INTRAVENOUS at 06:33

## 2024-02-13 RX ADMIN — Medication 100 MILLIGRAM(S): at 06:33

## 2024-02-13 RX ADMIN — OXYCODONE HYDROCHLORIDE 5 MILLIGRAM(S): 5 TABLET ORAL at 12:51

## 2024-02-13 NOTE — DISCHARGE NOTE NURSING/CASE MANAGEMENT/SOCIAL WORK - PATIENT PORTAL LINK FT
You can access the FollowMyHealth Patient Portal offered by [facility] by registering at the following website: [email or web address]. By joining [employer]’s FollowMyHealth portal, you will also be able to view your health information using other applications (apps) compatible with our system.

## 2024-02-13 NOTE — ANESTHESIA FOLLOW-UP NOTE - NSEVALATION_GEN_ALL_CORE
No apparent complications or complaints regarding anesthesia care at this time/All questions were answered 98

## 2024-02-13 NOTE — PROGRESS NOTE ADULT - SUBJECTIVE AND OBJECTIVE BOX
Patient is a 49y old  Female , former smoker, with PMH of HTN, HLD, A-flutter w/ RVR, adriana/tachy syndrome, s/p ablation, on Eliquis, s/p dual chamber ICD and extraction of pacemaker lead 12/2023. HFpEF (EF 55-60% on TTE 5/6/23), KANDICE on CPAP,  pHTN,  h/o LE DVT, who presents with a chief complaint of LLE non healing infected wound.    INTERVAL HPI/OVERNIGHT EVENTS:  afebrile, no acute events overnight     Vital Signs Last 24 Hrs  T(C): 36.2 (13 Feb 2024 11:40), Max: 36.8 (12 Feb 2024 20:00)  T(F): 97.2 (13 Feb 2024 11:40), Max: 98.2 (12 Feb 2024 20:00)  HR: 55 (13 Feb 2024 11:40) (55 - 88)  BP: 103/61 (13 Feb 2024 11:40) (98/62 - 133/77)  BP(mean): 77 (13 Feb 2024 11:40) (77 - 95)  RR: 20 (13 Feb 2024 11:40) (18 - 20)  SpO2: 94% (13 Feb 2024 11:40) (94% - 99%)    O2 Parameters below as of 13 Feb 2024 11:40  Patient On (Oxygen Delivery Method): room air      I&O's Summary    12 Feb 2024 07:01  -  13 Feb 2024 07:00  --------------------------------------------------------  IN: 0 mL / OUT: 300 mL / NET: -300 mL        Allergies  No Known Allergies      Lab Results:                        10.6   9.28  )-----------( 261      ( 13 Feb 2024 12:06 )             34.5     MEDICATIONS  (STANDING):  amoxicillin 500 milliGRAM(s) Oral every 8 hours  apixaban 5 milliGRAM(s) Oral every 12 hours  atorvastatin 80 milliGRAM(s) Oral at bedtime  buMETAnide 2 milliGRAM(s) Oral daily  cefepime   IVPB 2000 milliGRAM(s) IV Intermittent every 8 hours  chlorhexidine 4% Liquid 1 Application(s) Topical <User Schedule>  diltiazem    milliGRAM(s) Oral daily  influenza   Vaccine 0.5 milliLiter(s) IntraMuscular once  metoprolol succinate ER 50 milliGRAM(s) Oral at bedtime  metoprolol succinate  milliGRAM(s) Oral daily  pantoprazole    Tablet 40 milliGRAM(s) Oral before breakfast  polyethylene glycol 3350 17 Gram(s) Oral daily    MEDICATIONS  (PRN):  acetaminophen     Tablet .. 650 milliGRAM(s) Oral every 6 hours PRN Temp greater or equal to 38C (100.4F), Mild Pain (1 - 3), Moderate Pain (4 - 6), Severe Pain (7 - 10)  HYDROmorphone  Injectable 1 milliGRAM(s) IV Push two times a day PRN wound care  ibuprofen  Tablet. 400 milliGRAM(s) Oral every 6 hours PRN Temp greater or equal to 38C (100.4F), Mild Pain (1 - 3), Moderate Pain (4 - 6), Severe Pain (7 - 10)  ondansetron Injectable 4 milliGRAM(s) IV Push every 6 hours PRN Nausea  oxyCODONE    IR 10 milliGRAM(s) Oral every 6 hours PRN Severe Pain (7 - 10)  oxyCODONE    IR 5 milliGRAM(s) Oral every 6 hours PRN Severe Pain (7 - 10)    PHYSICAL EXAM:  GENERAL: well built, well nourished, NAD, laying in bed comfortably  HEAD:  Atraumatic, Normocephalic  EYES: EOMI, PERRLA, conjunctiva and sclera clear  NECK: Supple, No JVD  CHEST/LUNG:  B/L good air entry, no tachypnea/increased WOB/retractions. Clear to auscultation bilaterally; No wheeze  HEART: Regular rate and rhythm; No murmurs, rubs, or gallops  ABDOMEN: Soft, Nontender, Nondistended; Bowel sounds present  EXTREMITIES:  2+ Peripheral Pulses, No clubbing, cyanosis, or edema  NEUROLOGY: AAOx3, non-focal,  moves all four extremities  SKIN/Wound: Approximately 15cm x 6cm full thickness venous stasis ulcer, s/p debridement and BTM placement and Prevena wound vac in place; wound vac works appropriately, no bleeding noted;

## 2024-02-13 NOTE — PROGRESS NOTE ADULT - SUBJECTIVE AND OBJECTIVE BOX
YANETH ALMARAZ  49y, Female  Allergy: No Known Allergies      LOS  4d    CHIEF COMPLAINT: LLE wound management (12 Feb 2024 11:31)      INTERVAL EVENTS/HPI  - No acute events overnight  - T(F): , Max: 98.2 (02-12-24 @ 09:37)  - Denies any worsening symptoms  - Tolerating medication  - WBC Count: 8.42 (02-11-24 @ 11:26)  WBC Count: 7.94 (02-10-24 @ 11:01)     - Creatinine: 0.8 (02-11-24 @ 11:26)       ROS  General: Denies rigors, nightsweats  HEENT: Denies headache, rhinorrhea, sore throat, eye pain  CV: Denies CP, palpitations  PULM: Denies wheezing, hemoptysis  GI: Denies hematemesis, hematochezia, melena  : Denies discharge, hematuria  MSK: Denies arthralgias, myalgias  SKIN: Denies rash, lesions  NEURO: Denies paresthesias, weakness  PSYCH: Denies depression, anxiety    VITALS:  T(F): 97.1, Max: 98.2 (02-12-24 @ 09:37)  HR: 70  BP: 98/62  RR: 18Vital Signs Last 24 Hrs  T(C): 36.2 (12 Feb 2024 23:55), Max: 36.8 (12 Feb 2024 09:37)  T(F): 97.1 (12 Feb 2024 23:55), Max: 98.2 (12 Feb 2024 09:37)  HR: 70 (12 Feb 2024 23:55) (59 - 89)  BP: 98/62 (12 Feb 2024 23:55) (98/62 - 134/62)  BP(mean): 81 (12 Feb 2024 20:00) (81 - 95)  RR: 18 (12 Feb 2024 23:55) (14 - 18)  SpO2: 97% (12 Feb 2024 23:55) (92% - 98%)    Parameters below as of 12 Feb 2024 23:55  Patient On (Oxygen Delivery Method): room air        PHYSICAL EXAM:  Gen: NAD, resting in bed  HEENT: Normocephalic, atraumatic  Neck: supple, no lymphadenopathy  CV: Regular rate & regular rhythm  Lungs: decreased BS at bases, no fremitus  Abdomen: Soft, BS present  Ext: Warm, well perfused  Neuro: non focal, awake  Skin: no rash, no erythema  Lines: no phlebitis    FH: Non-contributory  Social Hx: Non-contributory    TESTS & MEASUREMENTS:                        10.6   8.42  )-----------( 213      ( 11 Feb 2024 11:26 )             34.3     02-11    142  |  102  |  16  ----------------------------<  122<H>  4.2   |  26  |  0.8    Ca    8.8      11 Feb 2024 11:26  Phos  4.3     02-11  Mg     1.9     02-11          Urinalysis Basic - ( 11 Feb 2024 11:26 )    Color: x / Appearance: x / SG: x / pH: x  Gluc: 122 mg/dL / Ketone: x  / Bili: x / Urobili: x   Blood: x / Protein: x / Nitrite: x   Leuk Esterase: x / RBC: x / WBC x   Sq Epi: x / Non Sq Epi: x / Bacteria: x        Culture - Fungal, Other (collected 02-09-24 @ 09:20)  Source: .Other None  Preliminary Report (02-10-24 @ 08:12):    Testing in progress    Culture - Acid Fast - Other w/Smear (collected 02-09-24 @ 09:20)  Source: .Other None    Culture - Surgical Swab (collected 02-09-24 @ 09:20)  Source: .Surgical Swab None  Final Report (02-12-24 @ 16:40):    Culture yields >4 types of aerobic and/or anaerobic bacteria    Call client services within 7 days if further workup is clinically    indicated.    Few Citrobacter freundii    Moderate Pseudomonas aeruginosa (Carbapenem Resistant)    Few Enterococcus faecalis    See previous culture [id number]    Few Staphylococcus epidermidis "Susceptibilities not performed"  Organism: Citrobacter freundii (02-12-24 @ 16:40)  Organism: Citrobacter freundii (02-12-24 @ 16:40)      Method Type: TEE      -  Amoxicillin/Clavulanic Acid: R >16/8      -  Ampicillin: R >16 These ampicillin results predict results for amoxicillin      -  Ampicillin/Sulbactam: R 8/4      -  Aztreonam: S <=4      -  Cefazolin: R >16      -  Cefepime: S <=2      -  Cefoxitin: R >16      -  Ceftriaxone: S <=1 Enterobacter cloacae, Klebsiella aerogenes, and Citrobacter freundii may develop resistance during prolonged therapy.      -  Ciprofloxacin: S <=0.25      -  Ertapenem: S <=0.5      -  Gentamicin: S <=2      -  Imipenem: S <=1      -  Levofloxacin: S <=0.5      -  Meropenem: S <=1      -  Piperacillin/Tazobactam: S <=8      -  Tobramycin: S <=2      -  Trimethoprim/Sulfamethoxazole: S <=0.5/9.5    Culture - Fungal, Other (collected 02-09-24 @ 09:20)  Source: .Other None  Preliminary Report (02-10-24 @ 08:12):    Testing in progress    Culture - Acid Fast - Other w/Smear (collected 02-09-24 @ 09:20)  Source: .Other None    Culture - Surgical Swab (collected 02-09-24 @ 09:20)  Source: .Surgical Swab None  Preliminary Report (02-12-24 @ 16:36):    Moderate Enterococcus avium    Few Enterococcus faecalis    Few Citrobacter freundii    Few Pseudomonas aeruginosa (Carbapenem Resistant)  Organism: Citrobacter freundii  Pseudomonas aeruginosa (Carbapenem Resistant)  Pseudomonas aeruginosa (Carbapenem Resistant)  Enterococcus avium  Enterococcus faecalis (02-12-24 @ 16:35)  Organism: Enterococcus faecalis (02-12-24 @ 16:35)      Method Type: TEE      -  Ampicillin: S <=2 Predicts results to ampicillin/sulbactam, amoxacillin-clavulanate and  piperacillin-tazobactam.      -  Vancomycin: S 4  Organism: Pseudomonas aeruginosa (Carbapenem Resistant) (02-12-24 @ 16:34)      Method Type: TEE      -  Aztreonam: I 16      -  Cefepime: S 8      -  Ceftazidime: S 4      -  Ceftazidime/Avibactam: S <=4      -  Ceftolozane/tazobactam: S <=2      -  Ciprofloxacin: R >2      -  Imipenem: S 2      -  Levofloxacin: R >4      -  Meropenem: R 8      -  Piperacillin/Tazobactam: S 16  Organism: Pseudomonas aeruginosa (Carbapenem Resistant) (02-12-24 @ 16:33)      Method Type: CarbaR      -  Resistance Gene to Carbapenem: Nondet  Organism: Enterococcus avium (02-11-24 @ 18:22)      Method Type: TEE      -  Ampicillin: S <=2 Predicts results to ampicillin/sulbactam, amoxacillin-clavulanate and  piperacillin-tazobactam.      -  Vancomycin: S 0.5  Organism: Citrobacter freundii (02-11-24 @ 15:45)      Method Type: TEE      -  Amoxicillin/Clavulanic Acid: R >16/8      -  Ampicillin: R 16 These ampicillin results predict results for amoxicillin      -  Ampicillin/Sulbactam: R <=4/2      -  Aztreonam: S <=4      -  Cefazolin: R >16      -  Cefepime: S <=2      -  Cefoxitin: R >16      -  Ceftriaxone: S <=1 Enterobacter cloacae, Klebsiella aerogenes, and Citrobacter freundii may develop resistance during prolonged therapy.      -  Ciprofloxacin: S <=0.25      -  Ertapenem: S <=0.5      -  Gentamicin: S <=2      -  Imipenem: S <=1      -  Levofloxacin: S <=0.5      -  Meropenem: S <=1      -  Piperacillin/Tazobactam: S <=8      -  Tobramycin: S <=2      -  Trimethoprim/Sulfamethoxazole: S <=0.5/9.5            INFECTIOUS DISEASES TESTING      INFLAMMATORY MARKERS  Sedimentation Rate, Erythrocyte: 48 mm/Hr (08-30-23 @ 10:37)  C-Reactive Protein, Serum: 6.3 mg/L (08-30-23 @ 10:37)      RADIOLOGY & ADDITIONAL TESTS:  I have personally reviewed the last available Chest xray  CXR      CT      CARDIOLOGY TESTING  12 Lead ECG:   Ventricular Rate 95 BPM    Atrial Rate 95 BPM    P-R Interval 190 ms    QRS Duration 110 ms    Q-T Interval 394 ms    QTC Calculation(Bazett) 495 ms    P Axis -8 degrees    R Axis 84 degrees    T Axis 10 degrees    Diagnosis Line Sinus rhythm with occasional Premature ventricular complexes  Incomplete right bundle branch block  Prolonged QT  Abnormal ECG    Confirmed by MARTHA YOU MD ([id number]) on 2/12/2024 7:00:21 AM (02-11-24 @ 12:55)      MEDICATIONS  apixaban 5 Oral every 12 hours  atorvastatin 80 Oral at bedtime  buMETAnide 2 Oral daily  chlorhexidine 4% Liquid 1 Topical <User Schedule>  diltiazem    Oral daily  influenza   Vaccine 0.5 IntraMuscular once  metoprolol succinate  Oral daily  metoprolol succinate ER 50 Oral at bedtime  pantoprazole    Tablet 40 Oral before breakfast  piperacillin/tazobactam IVPB.. 3.375 IV Intermittent every 8 hours  polyethylene glycol 3350 17 Oral daily      WEIGHT  Weight (kg): 148.3 (02-12-24 @ 10:19)      ANTIBIOTICS:  piperacillin/tazobactam IVPB.. 3.375 Gram(s) IV Intermittent every 8 hours      All available historical records have been reviewed       YANETH ALMARAZ  49y, Female  Allergy: No Known Allergies      LOS  4d    CHIEF COMPLAINT: LLE wound management (12 Feb 2024 11:31)      INTERVAL EVENTS/HPI  - No acute events overnight  - T(F): , Max: 98.2 (02-12-24 @ 09:37)  - Denies any worsening symptoms  - Tolerating medication  - WBC Count: 8.42 (02-11-24 @ 11:26)  WBC Count: 7.94 (02-10-24 @ 11:01)     - Creatinine: 0.8 (02-11-24 @ 11:26)       ROS  General: Denies rigors, nightsweats  HEENT: Denies headache, rhinorrhea, sore throat, eye pain  CV: Denies CP, palpitations  PULM: Denies wheezing, hemoptysis  GI: Denies hematemesis, hematochezia, melena  : Denies discharge, hematuria  MSK: Denies arthralgias, myalgias  SKIN: wound is dressed  NEURO: Denies paresthesias, weakness  PSYCH: Denies depression, anxiety    VITALS:  T(F): 97.1, Max: 98.2 (02-12-24 @ 09:37)  HR: 70  BP: 98/62  RR: 18Vital Signs Last 24 Hrs  T(C): 36.2 (12 Feb 2024 23:55), Max: 36.8 (12 Feb 2024 09:37)  T(F): 97.1 (12 Feb 2024 23:55), Max: 98.2 (12 Feb 2024 09:37)  HR: 70 (12 Feb 2024 23:55) (59 - 89)  BP: 98/62 (12 Feb 2024 23:55) (98/62 - 134/62)  BP(mean): 81 (12 Feb 2024 20:00) (81 - 95)  RR: 18 (12 Feb 2024 23:55) (14 - 18)  SpO2: 97% (12 Feb 2024 23:55) (92% - 98%)    Parameters below as of 12 Feb 2024 23:55  Patient On (Oxygen Delivery Method): room air        PHYSICAL EXAM:  Gen: NAD, resting in bed  HEENT: Normocephalic, atraumatic  Neck: supple, no lymphadenopathy  CV: Regular rate & regular rhythm  Lungs: decreased BS at bases, no fremitus  Abdomen: Soft, BS present  Ext: Warm, well perfused  Neuro: non focal, awake  Skin: no rash, no erythema  Lines: no phlebitis    FH: Non-contributory  Social Hx: Non-contributory    TESTS & MEASUREMENTS:                        10.6   8.42  )-----------( 213      ( 11 Feb 2024 11:26 )             34.3     02-11    142  |  102  |  16  ----------------------------<  122<H>  4.2   |  26  |  0.8    Ca    8.8      11 Feb 2024 11:26  Phos  4.3     02-11  Mg     1.9     02-11          Urinalysis Basic - ( 11 Feb 2024 11:26 )    Color: x / Appearance: x / SG: x / pH: x  Gluc: 122 mg/dL / Ketone: x  / Bili: x / Urobili: x   Blood: x / Protein: x / Nitrite: x   Leuk Esterase: x / RBC: x / WBC x   Sq Epi: x / Non Sq Epi: x / Bacteria: x        Culture - Fungal, Other (collected 02-09-24 @ 09:20)  Source: .Other None  Preliminary Report (02-10-24 @ 08:12):    Testing in progress    Culture - Acid Fast - Other w/Smear (collected 02-09-24 @ 09:20)  Source: .Other None    Culture - Surgical Swab (collected 02-09-24 @ 09:20)  Source: .Surgical Swab None  Final Report (02-12-24 @ 16:40):    Culture yields >4 types of aerobic and/or anaerobic bacteria    Call client services within 7 days if further workup is clinically    indicated.    Few Citrobacter freundii    Moderate Pseudomonas aeruginosa (Carbapenem Resistant)    Few Enterococcus faecalis    See previous culture [id number]    Few Staphylococcus epidermidis "Susceptibilities not performed"  Organism: Citrobacter freundii (02-12-24 @ 16:40)  Organism: Citrobacter freundii (02-12-24 @ 16:40)      Method Type: TEE      -  Amoxicillin/Clavulanic Acid: R >16/8      -  Ampicillin: R >16 These ampicillin results predict results for amoxicillin      -  Ampicillin/Sulbactam: R 8/4      -  Aztreonam: S <=4      -  Cefazolin: R >16      -  Cefepime: S <=2      -  Cefoxitin: R >16      -  Ceftriaxone: S <=1 Enterobacter cloacae, Klebsiella aerogenes, and Citrobacter freundii may develop resistance during prolonged therapy.      -  Ciprofloxacin: S <=0.25      -  Ertapenem: S <=0.5      -  Gentamicin: S <=2      -  Imipenem: S <=1      -  Levofloxacin: S <=0.5      -  Meropenem: S <=1      -  Piperacillin/Tazobactam: S <=8      -  Tobramycin: S <=2      -  Trimethoprim/Sulfamethoxazole: S <=0.5/9.5    Culture - Fungal, Other (collected 02-09-24 @ 09:20)  Source: .Other None  Preliminary Report (02-10-24 @ 08:12):    Testing in progress    Culture - Acid Fast - Other w/Smear (collected 02-09-24 @ 09:20)  Source: .Other None    Culture - Surgical Swab (collected 02-09-24 @ 09:20)  Source: .Surgical Swab None  Preliminary Report (02-12-24 @ 16:36):    Moderate Enterococcus avium    Few Enterococcus faecalis    Few Citrobacter freundii    Few Pseudomonas aeruginosa (Carbapenem Resistant)  Organism: Citrobacter freundii  Pseudomonas aeruginosa (Carbapenem Resistant)  Pseudomonas aeruginosa (Carbapenem Resistant)  Enterococcus avium  Enterococcus faecalis (02-12-24 @ 16:35)  Organism: Enterococcus faecalis (02-12-24 @ 16:35)      Method Type: TEE      -  Ampicillin: S <=2 Predicts results to ampicillin/sulbactam, amoxacillin-clavulanate and  piperacillin-tazobactam.      -  Vancomycin: S 4  Organism: Pseudomonas aeruginosa (Carbapenem Resistant) (02-12-24 @ 16:34)      Method Type: TEE      -  Aztreonam: I 16      -  Cefepime: S 8      -  Ceftazidime: S 4      -  Ceftazidime/Avibactam: S <=4      -  Ceftolozane/tazobactam: S <=2      -  Ciprofloxacin: R >2      -  Imipenem: S 2      -  Levofloxacin: R >4      -  Meropenem: R 8      -  Piperacillin/Tazobactam: S 16  Organism: Pseudomonas aeruginosa (Carbapenem Resistant) (02-12-24 @ 16:33)      Method Type: CarbaR      -  Resistance Gene to Carbapenem: Nondet  Organism: Enterococcus avium (02-11-24 @ 18:22)      Method Type: TEE      -  Ampicillin: S <=2 Predicts results to ampicillin/sulbactam, amoxacillin-clavulanate and  piperacillin-tazobactam.      -  Vancomycin: S 0.5  Organism: Citrobacter freundii (02-11-24 @ 15:45)      Method Type: TEE      -  Amoxicillin/Clavulanic Acid: R >16/8      -  Ampicillin: R 16 These ampicillin results predict results for amoxicillin      -  Ampicillin/Sulbactam: R <=4/2      -  Aztreonam: S <=4      -  Cefazolin: R >16      -  Cefepime: S <=2      -  Cefoxitin: R >16      -  Ceftriaxone: S <=1 Enterobacter cloacae, Klebsiella aerogenes, and Citrobacter freundii may develop resistance during prolonged therapy.      -  Ciprofloxacin: S <=0.25      -  Ertapenem: S <=0.5      -  Gentamicin: S <=2      -  Imipenem: S <=1      -  Levofloxacin: S <=0.5      -  Meropenem: S <=1      -  Piperacillin/Tazobactam: S <=8      -  Tobramycin: S <=2      -  Trimethoprim/Sulfamethoxazole: S <=0.5/9.5            INFECTIOUS DISEASES TESTING      INFLAMMATORY MARKERS  Sedimentation Rate, Erythrocyte: 48 mm/Hr (08-30-23 @ 10:37)  C-Reactive Protein, Serum: 6.3 mg/L (08-30-23 @ 10:37)      RADIOLOGY & ADDITIONAL TESTS:  I have personally reviewed the last available Chest xray  CXR      CT      CARDIOLOGY TESTING  12 Lead ECG:   Ventricular Rate 95 BPM    Atrial Rate 95 BPM    P-R Interval 190 ms    QRS Duration 110 ms    Q-T Interval 394 ms    QTC Calculation(Bazett) 495 ms    P Axis -8 degrees    R Axis 84 degrees    T Axis 10 degrees    Diagnosis Line Sinus rhythm with occasional Premature ventricular complexes  Incomplete right bundle branch block  Prolonged QT  Abnormal ECG    Confirmed by MARTHA YOU MD ([id number]) on 2/12/2024 7:00:21 AM (02-11-24 @ 12:55)      MEDICATIONS  apixaban 5 Oral every 12 hours  atorvastatin 80 Oral at bedtime  buMETAnide 2 Oral daily  chlorhexidine 4% Liquid 1 Topical <User Schedule>  diltiazem    Oral daily  influenza   Vaccine 0.5 IntraMuscular once  metoprolol succinate  Oral daily  metoprolol succinate ER 50 Oral at bedtime  pantoprazole    Tablet 40 Oral before breakfast  piperacillin/tazobactam IVPB.. 3.375 IV Intermittent every 8 hours  polyethylene glycol 3350 17 Oral daily      WEIGHT  Weight (kg): 148.3 (02-12-24 @ 10:19)      ANTIBIOTICS:  piperacillin/tazobactam IVPB.. 3.375 Gram(s) IV Intermittent every 8 hours      All available historical records have been reviewed

## 2024-02-13 NOTE — PROGRESS NOTE ADULT - TIME BILLING
wound eval and treat  OR next week
I have personally seen and examined this patient.    I have reviewed all pertinent clinical information and reviewed all relevant imaging and diagnostic studies personally.   I counseled the patient about diagnostic testing and treatment plan. All questions were answered.   I discussed recommendations with the primary team.
OR tomorrow for further debridement and possible BTM placement  Pre op labs ordered  NPO at [facility]  cont wound care  Cont IV antibx  DVT GI Prophylaxis  Pain control  OT/PT  F/u Cx  Elevation

## 2024-02-13 NOTE — PROGRESS NOTE ADULT - ASSESSMENT
ASSESSMENT  49 yo female who presents for elective debridement and possible BTM placement to the wound on her LLE due to a stasis ulcer x 2 years.     IMPRESSION  #Chronic LLE Ulcer   - s/p debridement 2/9 - adherent necrotic tissue - Citrobacter freundii, CR Pseudomonas, Entercorcoccus   - Wound Cx OSH Citrobacter freundii, Pseudomonas, E avium   - BTM 2/12     #Obesity BMI (kg/m2): 52.8, 52.8, 52.8  #Abx allergy: No Known Allergies    RECOMMENDATIONS  - continue zosyn 3.375 mg q 8 hours     Please call or message on [employer] Teams if with any questions.  Spectra [phone]       ASSESSMENT  47 yo female who presents for elective debridement and possible BTM placement to the wound on her LLE due to a stasis ulcer x 2 years.     IMPRESSION  #Chronic LLE Ulcer   - s/p debridement 2/9 - adherent necrotic tissue - Citrobacter freundii, CR Pseudomonas, Entercorcoccus   - Wound Cx OSH Citrobacter freundii, Pseudomonas, E avium   - BTM 2/12     #Obesity BMI (kg/m2): 52.8, 52.8, 52.8  #Abx allergy: No Known Allergies    RECOMMENDATIONS  - as noted to have green/yellow discharge prior to debridement, will plan for cefepime 2g q 8 hours with PO amoxicilin 500 mg q 8 hours   - plan for 10 days post matrix placement 2/13-2/22 to finish with midline       Please call or message on [employer] Teams if with any questions.  Spectra [phone]

## 2024-02-13 NOTE — PROGRESS NOTE ADULT - ASSESSMENT
Patient is a 49y old  Female , former smoker, with PMH of HTN, HLD, A-flutter w/ RVR, adriana/tachy syndrome, s/p ablation, on Eliquis, s/p dual chamber ICD and extraction of pacemaker lead 12/2023. HFpEF (EF 55-60% on TTE 5/6/23), KANDICE on CPAP,  pHTN,  h/o LE DVT, who presents with a chief complaint of LLE non healing infected wound.    # LLE chronic venous stasis non healing infected  wound:  - 2/9 s/p debridement of LLE   - 2/12 s/p debridment of LLE +novosorb + prevena NPWT -> plan to discharge with wound vac in place and follow up on Tue 2/20 in [facility], if wound vac failure at home -> remove wound vac dressing, and apply adaptic, then wrap with kelrix and ACE wrap every other day.   - Wcx 2/9 Few Citrobacter freundii Moderate Pseudomonas aeruginosa Few Enterococcus faecalis Few Staphylococcus epidermidis   - as per ID recommendations started on zosyn IV -> on discharge Cefepime 2gm IV q8h and Amoxicilline 500mg tids X 10 days vis midline   - Pain management  - ambulate as tolerate     # CARD  # hx chronic A-Fib on Eliquis s/p ablation 5yo [facility]  #h/o tachy-adriana syndrome - s/p left bundle area pacing on 8/2023 with upgrade to dual chamber ICD and extraction of pacemaker lead in 12/2023.  #h/o HTN   - continue home BP meds -on Metoprolol ER 100mg  daily and 50mg hs, Diltiazem 240mg daily, Bumex 2mg daily, Lipitor 80mg hs  - continue Eliquis 5mg bid     # pulmonary HTN  - c/w sildenafil for Pul HTN    #Raynaud's  -on sildenafil for Raynaud's    #KANDICE  -continue CPAP hs    - f/u SW for DC planning

## 2024-02-14 LAB
CULTURE RESULTS: ABNORMAL
ORGANISM # SPEC MICROSCOPIC CNT: ABNORMAL
ORGANISM # SPEC MICROSCOPIC CNT: SIGNIFICANT CHANGE UP
SPECIMEN SOURCE: SIGNIFICANT CHANGE UP
SURGICAL PATHOLOGY STUDY: SIGNIFICANT CHANGE UP

## 2024-02-14 PROCEDURE — 99232 SBSQ HOSP IP/OBS MODERATE 35: CPT

## 2024-02-14 RX ADMIN — OXYCODONE HYDROCHLORIDE 10 MILLIGRAM(S): 5 TABLET ORAL at 20:14

## 2024-02-14 RX ADMIN — APIXABAN 5 MILLIGRAM(S): 2.5 TABLET, FILM COATED ORAL at 05:35

## 2024-02-14 RX ADMIN — Medication 500 MILLIGRAM(S): at 21:15

## 2024-02-14 RX ADMIN — CEFEPIME 100 MILLIGRAM(S): 1 INJECTION, POWDER, FOR SOLUTION INTRAMUSCULAR; INTRAVENOUS at 21:14

## 2024-02-14 RX ADMIN — Medication 500 MILLIGRAM(S): at 13:40

## 2024-02-14 RX ADMIN — Medication 100 MILLIGRAM(S): at 05:36

## 2024-02-14 RX ADMIN — Medication 50 MILLIGRAM(S): at 21:15

## 2024-02-14 RX ADMIN — OXYCODONE HYDROCHLORIDE 10 MILLIGRAM(S): 5 TABLET ORAL at 20:44

## 2024-02-14 RX ADMIN — Medication 500 MILLIGRAM(S): at 05:35

## 2024-02-14 RX ADMIN — Medication 240 MILLIGRAM(S): at 05:35

## 2024-02-14 RX ADMIN — BUMETANIDE 2 MILLIGRAM(S): 0.25 INJECTION INTRAMUSCULAR; INTRAVENOUS at 05:35

## 2024-02-14 RX ADMIN — OXYCODONE HYDROCHLORIDE 10 MILLIGRAM(S): 5 TABLET ORAL at 06:13

## 2024-02-14 RX ADMIN — OXYCODONE HYDROCHLORIDE 10 MILLIGRAM(S): 5 TABLET ORAL at 07:16

## 2024-02-14 RX ADMIN — ATORVASTATIN CALCIUM 80 MILLIGRAM(S): 80 TABLET, FILM COATED ORAL at 21:15

## 2024-02-14 RX ADMIN — CEFEPIME 100 MILLIGRAM(S): 1 INJECTION, POWDER, FOR SOLUTION INTRAMUSCULAR; INTRAVENOUS at 05:34

## 2024-02-14 RX ADMIN — OXYCODONE HYDROCHLORIDE 10 MILLIGRAM(S): 5 TABLET ORAL at 13:40

## 2024-02-14 RX ADMIN — CHLORHEXIDINE GLUCONATE 1 APPLICATION(S): 213 SOLUTION TOPICAL at 06:42

## 2024-02-14 RX ADMIN — APIXABAN 5 MILLIGRAM(S): 2.5 TABLET, FILM COATED ORAL at 18:38

## 2024-02-14 RX ADMIN — HYDROMORPHONE HYDROCHLORIDE 1 MILLIGRAM(S): 2 INJECTION INTRAMUSCULAR; INTRAVENOUS; SUBCUTANEOUS at 08:25

## 2024-02-14 RX ADMIN — PANTOPRAZOLE SODIUM 40 MILLIGRAM(S): 20 TABLET, DELAYED RELEASE ORAL at 05:35

## 2024-02-14 RX ADMIN — CEFEPIME 100 MILLIGRAM(S): 1 INJECTION, POWDER, FOR SOLUTION INTRAMUSCULAR; INTRAVENOUS at 16:00

## 2024-02-14 NOTE — PROGRESS NOTE ADULT - ASSESSMENT
Patient is a 49y old  Female , former smoker, with PMH of HTN, HLD, A-flutter w/ RVR, adriana/tachy syndrome, s/p ablation, on Eliquis, s/p dual chamber ICD and extraction of pacemaker lead 12/2023. HFpEF (EF 55-60% on TTE 5/6/23), KANDICE on CPAP,  pHTN,  h/o LE DVT, who presents with a chief complaint of LLE non healing infected wound.    # LLE chronic venous stasis non healing infected  wound:  - 2/9 s/p debridement of LLE   - 2/12 s/p debridment of LLE +novosorb + prevena NPWT ->   -2/14 Prevena wound vac removed  -LWC: apply adaptic over BTM, then wrap with kelrix and ACE wrap every other day.   - Wcx 2/9 Few Citrobacter freundii Moderate Pseudomonas aeruginosa Few Enterococcus faecalis Few Staphylococcus epidermidis   - as per ID recommendations started on zosyn IV -> on discharge Cefepime 2gm IV q8h and Amoxicillin 500mg tids X 10 days vis midline   - Pain management  - ambulate as tolerate     # CARD  # hx chronic A-Fib on Eliquis s/p ablation 3yo [facility]  #h/o tachy-adriana syndrome - s/p left bundle area pacing on 8/2023 with upgrade to dual chamber ICD and extraction of pacemaker lead in 12/2023.  #h/o HTN   - continue home BP meds -on Metoprolol ER 100mg  daily and 50mg hs, Diltiazem 240mg daily, Bumex 2mg daily, Lipitor 80mg hs  - continue Eliquis 5mg bid     # pulmonary HTN  - c/w sildenafil for Pul HTN    #Raynaud's  -on sildenafil for Raynaud's    #KANDICE  -continue CPAP hs    - f/u SW for DC planning     Plan of care discussed with patient. Concerns addressed.

## 2024-02-15 ENCOUNTER — TRANSCRIPTION ENCOUNTER (OUTPATIENT)
Age: 49
End: 2024-02-15

## 2024-02-15 VITALS
SYSTOLIC BLOOD PRESSURE: 115 MMHG | TEMPERATURE: 96 F | RESPIRATION RATE: 20 BRPM | HEART RATE: 92 BPM | DIASTOLIC BLOOD PRESSURE: 73 MMHG | OXYGEN SATURATION: 96 %

## 2024-02-15 PROCEDURE — 99238 HOSP IP/OBS DSCHRG MGMT 30/<: CPT

## 2024-02-15 RX ORDER — OXYCODONE AND ACETAMINOPHEN 5; 325 MG/1; MG/1
1 TABLET ORAL
Refills: 0 | DISCHARGE

## 2024-02-15 RX ORDER — AMOXICILLIN 250 MG/5ML
1 SUSPENSION, RECONSTITUTED, ORAL (ML) ORAL
Qty: 27 | Refills: 0
Start: 2024-02-15 | End: 2024-02-23

## 2024-02-15 RX ORDER — OXYCODONE HYDROCHLORIDE 5 MG/1
1 TABLET ORAL
Qty: 21 | Refills: 0
Start: 2024-02-15 | End: 2024-02-21

## 2024-02-15 RX ORDER — CEFEPIME 1 G/1
2 INJECTION, POWDER, FOR SOLUTION INTRAMUSCULAR; INTRAVENOUS
Qty: 0 | Refills: 0 | DISCHARGE
Start: 2024-02-15

## 2024-02-15 RX ORDER — ACETAMINOPHEN 500 MG
2 TABLET ORAL
Qty: 0 | Refills: 0 | DISCHARGE
Start: 2024-02-15

## 2024-02-15 RX ADMIN — PANTOPRAZOLE SODIUM 40 MILLIGRAM(S): 20 TABLET, DELAYED RELEASE ORAL at 05:56

## 2024-02-15 RX ADMIN — OXYCODONE HYDROCHLORIDE 10 MILLIGRAM(S): 5 TABLET ORAL at 06:49

## 2024-02-15 RX ADMIN — Medication 100 MILLIGRAM(S): at 05:56

## 2024-02-15 RX ADMIN — Medication 500 MILLIGRAM(S): at 05:56

## 2024-02-15 RX ADMIN — OXYCODONE HYDROCHLORIDE 10 MILLIGRAM(S): 5 TABLET ORAL at 07:19

## 2024-02-15 RX ADMIN — Medication 240 MILLIGRAM(S): at 05:57

## 2024-02-15 RX ADMIN — APIXABAN 5 MILLIGRAM(S): 2.5 TABLET, FILM COATED ORAL at 05:56

## 2024-02-15 RX ADMIN — CEFEPIME 100 MILLIGRAM(S): 1 INJECTION, POWDER, FOR SOLUTION INTRAMUSCULAR; INTRAVENOUS at 05:55

## 2024-02-15 NOTE — DISCHARGE NOTE PROVIDER - NSDCFUSCHEDAPPT_GEN_ALL_CORE_FT
Jorge L Doyle  [facility] PreAdmits  Scheduled Appointment: 03/07/2024    [facility]  BURNTREAT [address]  Scheduled Appointment: 03/07/2024    [facility]  CARDIOLOGY [address]  Scheduled Appointment: 04/25/2024    [facility]  ELECTROPH [address]  Scheduled Appointment: 04/29/2024

## 2024-02-15 NOTE — DISCHARGE NOTE PROVIDER - NSDCMRMEDTOKEN_GEN_ALL_CORE_FT
apixaban 5 mg oral tablet: 1 tab(s) orally every 12 hours  Bumex 2 mg oral tablet: 1 orally once a day  dilTIAZem 240 mg/24 hours oral capsule, extended release: 1 cap(s) orally once a day  Lipitor 80 mg oral tablet: 1 orally once a day (at bedtime)  Metoprolol Succinate  mg oral tablet, extended release: 1 tab(s) orally once a day  oxycodone-acetaminophen 5 mg-325 mg oral tablet: 1 tab(s) orally as needed for  moderate pain  sildenafil 20 mg oral tablet: 1 orally 3 times a day   acetaminophen 325 mg oral tablet: 2 tab(s) orally every 6 hours As needed Mild Pain (1 - 3)  amoxicillin 500 mg oral capsule: 1 cap(s) orally every 8 hours  apixaban 5 mg oral tablet: 1 tab(s) orally every 12 hours  Bumex 2 mg oral tablet: 1 orally once a day  cefepime 2 g intravenous injection: 2 gram(s) intravenous every 8 hours  dilTIAZem 240 mg/24 hours oral capsule, extended release: 1 cap(s) orally once a day  Lipitor 80 mg oral tablet: 1 orally once a day (at bedtime)  Metoprolol Succinate  mg oral tablet, extended release: 1 tab(s) orally once a day  oxyCODONE 5 mg oral tablet: 1 tab(s) orally 3 times a day MDD: 3 tabs  sildenafil 20 mg oral tablet: 1 orally 3 times a day

## 2024-02-15 NOTE — DISCHARGE NOTE PROVIDER - HOSPITAL COURSE
Patient is a 49y old  Female , former smoker, with PMH of HTN, HLD, A-flutter w/ RVR, adriana/tachy syndrome, s/p ablation, on Eliquis, s/p dual chamber ICD and extraction of pacemaker lead 12/2023. HFpEF (EF 55-60% on TTE 5/6/23), KANDICE on CPAP,  pHTN,  h/o LE DVT, who presents with a chief complaint of LLE non healing infected wound. Pt was admitted under B[facility] service, underwent debridement of LLE on 2/9/24, and debridement and novosrb placement to LLE on 2/12/24.     Hospital course as follow:     # LLE chronic venous stasis non healing infected  wound:  - 2/9 s/p debridement of LLE   - 2/12 s/p debridment of LLE +novosorb + prevena NPWT -> wound vac off on 2/4,   - please continue local wound care every other day:  do not wash or submerge wound under water, apply adaptic, then wrap with kelrix and ACE wrap   - follow up on Tue 2/20 in [facility] with Dr Doyle   - Wcx 2/9 Few Citrobacter freundii Moderate Pseudomonas aeruginosa Few Enterococcus faecalis Few Staphylococcus epidermidis   - as per ID recommendations started on zosyn IV -> on discharge Cefepime 2gm IV q8h and Amoxicilline 500mg tids X 10 days via midline   - Pain management  - ambulate as tolerate     # CARD  # hx chronic A-Fib on Eliquis s/p ablation 5yo [facility]  #h/o tachy-adriana syndrome - s/p left bundle area pacing on 8/2023 with upgrade to dual chamber ICD and extraction of pacemaker lead in 12/2023.  #h/o HTN   - continue home BP meds -on Metoprolol ER 100mg  daily and 50mg hs, Diltiazem 240mg daily, Bumex 2mg daily, Lipitor 80mg hs  - continue Eliquis 5mg bid     # pulmonary HTN  - c/w sildenafil for Pul HTN    #Raynaud's  -on sildenafil for Raynaud's    #KANDICE  -continue CPAP hs    Pt is stable to be discharge home with recommendations to continue local wound care, continue antibiotics as prescribed, and follow up with Dr Doyle in [facility] on Tuesday 2/20/24.

## 2024-02-15 NOTE — PROGRESS NOTE ADULT - REASON FOR ADMISSION
LLE wound management

## 2024-02-15 NOTE — DISCHARGE NOTE PROVIDER - NSCORESITESY/N_GEN_A_CORE_RD
Hey, I saw her on Friday and went through her medications with her. Got rid of the discontinued medications- she has not taken anything since her discharge.Took her off her diltiazem. Currently off htn medications for now with the recent hypotensive hospital admission. Gave her a pill box. She scheduled to see me in a month. Ailyn.
No

## 2024-02-15 NOTE — DISCHARGE NOTE PROVIDER - NSFOLLOWUPCLINICS_GEN_ALL_ED_FT
[facility]  Burn  [address], [address]  [place] [address]  Phone: [phone]  Fax:

## 2024-02-15 NOTE — PROGRESS NOTE ADULT - SUBJECTIVE AND OBJECTIVE BOX
Patient is a 49y old  Female , former smoker, with PMH of HTN, HLD, A-flutter w/ RVR, adriana/tachy syndrome, s/p ablation, on Eliquis, s/p dual chamber ICD and extraction of pacemaker lead 12/2023. HFpEF (EF 55-60% on TTE 5/6/23), KANDICE on CPAP,  pHTN,  h/o LE DVT, who presents with a chief complaint of LLE non healing infected wound. Pt was admitted under BURN service, underwent debridement of LLE on 2/9/24, and debridement and novosrb placement to LLE on 2/12/24.       INTERVAL HPI/OVERNIGHT EVENTS:  afebrile, no acute events overnight     Vital Signs Last 24 Hrs  T(C): 35.8 (15 Feb 2024 05:15), Max: 36.6 (14 Feb 2024 20:54)  T(F): 96.4 (15 Feb 2024 05:15), Max: 97.8 (14 Feb 2024 20:54)  HR: 92 (15 Feb 2024 05:15) (78 - 96)  BP: 115/73 (15 Feb 2024 05:15) (108/66 - 120/58)  RR: 20 (15 Feb 2024 05:15) (18 - 20)  SpO2: 96% (15 Feb 2024 05:15) (94% - 98%)    O2 Parameters below as of 15 Feb 2024 05:15  Patient On (Oxygen Delivery Method): BiPAP/CPAP        Allergies  No Known Allergies    Intolerances        Lab Results:                        10.6   9.28  )-----------( 261      ( 13 Feb 2024 12:06 )             34.5     02-13    141  |  101  |  17  ----------------------------<  124<H>  4.0   |  28  |  0.8    Ca    9.0      13 Feb 2024 12:06  Phos  3.6     02-13  Mg     2.0     02-13    MEDICATIONS  (STANDING):  amoxicillin 500 milliGRAM(s) Oral every 8 hours  apixaban 5 milliGRAM(s) Oral every 12 hours  atorvastatin 80 milliGRAM(s) Oral at bedtime  buMETAnide 2 milliGRAM(s) Oral daily  cefepime   IVPB 2000 milliGRAM(s) IV Intermittent every 8 hours  chlorhexidine 4% Liquid 1 Application(s) Topical <User Schedule>  diltiazem    milliGRAM(s) Oral daily  influenza   Vaccine 0.5 milliLiter(s) IntraMuscular once  metoprolol succinate ER 50 milliGRAM(s) Oral at bedtime  metoprolol succinate  milliGRAM(s) Oral daily  pantoprazole    Tablet 40 milliGRAM(s) Oral before breakfast  polyethylene glycol 3350 17 Gram(s) Oral daily    MEDICATIONS  (PRN):  acetaminophen     Tablet .. 650 milliGRAM(s) Oral every 6 hours PRN Mild Pain (1 - 3)  HYDROmorphone  Injectable 1 milliGRAM(s) IV Push two times a day PRN wound care  ibuprofen  Tablet. 400 milliGRAM(s) Oral every 6 hours PRN Temp greater or equal to 38C (100.4F)  ondansetron Injectable 4 milliGRAM(s) IV Push every 6 hours PRN Nausea  oxyCODONE    IR 10 milliGRAM(s) Oral every 6 hours PRN Severe Pain (7 - 10)  oxyCODONE    IR 5 milliGRAM(s) Oral every 6 hours PRN Moderate Pain (4 - 6)    PHYSICAL EXAM:  GENERAL: alert, oriented, NAD, laying in bed comfortably  CHEST/LUNG:  B/L good air entry, no tachypnea/increased WOB/retractions.   HEART: not in cardiopulmonary distress   ABDOMEN: Soft, Nontender,   NEUROLOGY: AAOx3, non-focal,   SKIN/Wound: LLE:  s/p debridement and polynovo placement, dressing in place, no acute bleeding noted;

## 2024-02-15 NOTE — DISCHARGE NOTE PROVIDER - NSDCCPCAREPLAN_GEN_ALL_CORE_FT
PRINCIPAL DISCHARGE DIAGNOSIS  Diagnosis: Venous stasis ulcer of lower extremity  Assessment and Plan of Treatment: Pt presented with left lower extremity chronic venous stasis non healing infected  wound:  - 2/9/24 s/p debridement of LLE   - 2/12/24 s/p debridment of LLE +novosorb placement and + prevena wound vac placement -> wound vac off on 2/14,   - please continue local wound care every other day:  do not wash or submerge wound under water, apply adaptic, then wrap with kelrix and ACE wrap   - follow up on Tue 2/20 in [facility] with Dr Doyle, call [phone] to schedule follow up appointment   - as per ID recommendations continue Cefepime 2gm IV every 8 hrs and Amoxicilline 500mg three times a day until finish   - midline placed 2/13/24  - ambulate as tolerate

## 2024-02-15 NOTE — DISCHARGE NOTE PROVIDER - NSDCFUADDAPPT_GEN_ALL_CORE_FT
Please call [phone] to make a follow up appointment within 1 week with Dr. Doyle or Dr. Sullivan. Clinic is located at [address] [place] [address] on Tuesdays (2-4pm) or Thursdays (9am-1pm).

## 2024-02-15 NOTE — PROGRESS NOTE ADULT - ASSESSMENT
Patient is a 49y old  Female , former smoker, with PMH of HTN, HLD, A-flutter w/ RVR, adriana/tachy syndrome, s/p ablation, on Eliquis, s/p dual chamber ICD and extraction of pacemaker lead 12/2023. HFpEF (EF 55-60% on TTE 5/6/23), KANDICE on CPAP,  pHTN,  h/o LE DVT, who presents with a chief complaint of LLE non healing infected wound. Pt was admitted under BURN service, underwent debridement of LLE on 2/9/24, and debridement and novosrb placement to LLE on 2/12/24.     # LLE chronic venous stasis non healing infected  wound:  - 2/9 s/p debridement of LLE   - 2/12 s/p debridment of LLE +novosorb + prevena NPWT -> wound vac off on 2/4,   - please continue local wound care every other day:  do not wash or submerge wound under water, apply adaptic, then wrap with kelrix and ACE wrap   - follow up on Tue 2/20 in [facility] with Dr Doyle   - Wcx 2/9 Few Citrobacter freundii Moderate Pseudomonas aeruginosa Few Enterococcus faecalis Few Staphylococcus epidermidis   - as per ID recommendations started on zosyn IV -> on discharge Cefepime 2gm IV q8h and Amoxicilline 500mg tids X 10 days via midline   - Pain management  - ambulate as tolerate     # CARD  # hx chronic A-Fib on Eliquis s/p ablation 5yo [facility]  #h/o tachy-adriana syndrome - s/p left bundle area pacing on 8/2023 with upgrade to dual chamber ICD and extraction of pacemaker lead in 12/2023.  #h/o HTN   - continue home BP meds -on Metoprolol ER 100mg  daily and 50mg hs, Diltiazem 240mg daily, Bumex 2mg daily, Lipitor 80mg hs  - continue Eliquis 5mg bid     # pulmonary HTN  - c/w sildenafil for Pul HTN    #Raynaud's  -on sildenafil for Raynaud's    #KANDICE  -continue CPAP hs    Pt is stable to be discharge home with recommendations to continue local wound care, continue antibiotics as prescribed, and follow up with Dr Doyle in [facility] on Tuesday 2/20/24.

## 2024-02-16 PROBLEM — E66.01 MORBID (SEVERE) OBESITY DUE TO EXCESS CALORIES: Chronic | Status: ACTIVE | Noted: 2024-01-26

## 2024-02-17 LAB
CULTURE RESULTS: ABNORMAL
SPECIMEN SOURCE: SIGNIFICANT CHANGE UP

## 2024-02-21 NOTE — ASU PATIENT PROFILE, ADULT - CENTRAL VENOUS CATHETER
EXAM note    History    Susan Don is a 49 year old female last seen 2/1/23 when pt had labs done, again here for yearly HM visit.  Failed to get labs ordered after our 2018, 2019 AND 2020 visits; in 11/21 we had no labs since 7/28/16.  \"I'm all right\"    DM2 on diet control: good to excellent range with 6/29/23 A1c=7.0 from 2/23=7.1 making new Dx DM2.  Glucose trace elevated at 107 11/22/21, no FH DM  Obese by BMI  Hyperlipidemia: with good high HDL; 2/23 TG, LDL not quite to targets  Vit D deficiency 11/22/21, was taking Vit D \"but I get   GERD on omep daily does ok  Exercise induced asthma/RAD. Not needing inhaler at all lately again 11/22/21; used to be before jogging, allergy season, spring, or around cats.  Allergies were bad but now quieting down as colder weather starts.  Migraines that stopped since DC OCP pills.  Occas menstrual headaches come back, getting toward menopause so wants Ketoprofen that works well.  Carpal tunnel syndrome hx: quiet,  Uses braces at night, types for job  GYN=Dr Bradley sees regularly  Actinic keratosis hx: had LN2 by Derm at [facility] \"whenever they pop up, maybe twice so far, no cancer\" she noted 4/20/18  10/12/20 felt well but depression at times; daughter in therapy was suicidal cutting herself etc; 11/22/21 stated doing ok in this regard.   10/12/20 denied SI/HI.  Sometimes felt like going to bed in daytime, \"it's like I can't get enough sleep.  I don't sit there and cry, just sometimes don't want to do anything.\"  She didn't want refer to Behavioral Health or to start Rx such as SSRI; preferred check labs incl thyroid, start exercise/strategies, and will call if needs more; then she failed to get the labs drawn.  Exercise: walking w/o troble      Likes to walk and bike for exercise without probs, used to run a lot but knee soreness so stopped extensive running and ok since.  \"I've been sitting a lot on job, not exercising as much so gained wt\" she says with a  smile    I have reviewed the past medical, family and social history sections including the medications and allergies listed in the above medical record as well as the nursing notes.   Past Medical History:   Diagnosis Date    Asthma, mild intermittent, well-controlled     cat allergy kicks up asthma and itching    Carpal tunnel syndrome, bilateral     uses wrist splints bilateral, never saw ortho    Chondromalacia of both patellae     occas pain, rests and ok    Colon cancer screening     asx no FH, consider age 48    GERD without esophagitis     H/O complete eye exam     glasses, eye exam early 2014 ok per pt    H/O gynecological procedure     P/P 2013 ok, still menstruating as of 5/15    H/O mammogram     had 2013 ok with Dr Flores    Health maintenance examination     History of PFTs     8/05 PFT normal, though methacholine borderline abn at 19% reduction FEV1 (20% would be positive) suggests mild asthma, did improve with bronchdilator    Hyperlipidemia, unspecified     1/14   HDL90 [id number], a good profile    Migraine headache     takes Ketoprofen to help these    Solis's neuroma of right foot     6/07 Podiatry Dr Davidson Stewart dx Solis's Neuroma R foot, gave arch support pad, to consider orthotics, ok now    Need for influenza vaccination     fluvax 10/9/15    Need for pneumococcal vaccination     pvax 1/14    Need for Tdap vaccination     Tdap 5/5/15    Need for zoster vaccine     Obesity (BMI 30-39.9)     Screening for diabetic retinopathy     Screening for nephropathy     Screening for other disorders of blood and blood-forming organs     1/14 CBC normal, ferritin 11 ()    Screening for thyroid disorder     8/05 TSH=1.30, FT4=1.2    Seasonal allergic rhinitis     sneezing, itchy eyes    Type 2 diabetes mellitus with hyperglycemia, without long-term current use of insulin (CMD)     2/2023 a1c=7.0    Vitamin D deficiency     1/21 VitD=19.6     Current Outpatient Medications   Medication Sig  Dispense Refill    Multiple Vitamins-Minerals (vitamin - therapeutic multivitamins w/minerals) tablet       Cholecalciferol (Vitamin D3) 50 mcg (2,000 units) tablet       Ascorbic Acid (vitamin C) 500 MG tablet Take 500 mg by mouth daily.      ZINC SULFATE PO       magnesium 250 MG tablet       omeprazole (PriLOSEC) 40 MG capsule TAKE 1 CAPSULE BY MOUTH DAILY BEFORE BREAKFAST 90 capsule 0    albuterol 108 (90 BASE) MCG/ACT inhaler Inhale 2 puffs into the lungs every 4 hours as needed for Shortness of Breath or Wheezing. Pro air HFA 1 Inhaler 11    Loratadine-Pseudoephedrine (CLARITIN-D 24 HOUR PO) Take by mouth daily.      Probiotic Product (PROBIOTIC DAILY PO) Take by mouth daily.       No current facility-administered medications for this visit.     ALLERGIES:   Allergen Reactions    Naproxen GI UPSET     But ketoprofen ok per pt    Cat Dander Other (See Comments)     Watery eyes,asthma    Contrast Media RASH    Seasonal Other (See Comments)     pollens     Social History     Socioeconomic History    Marital status: /Civil Union     Spouse name: Not on file    Number of children: Not on file    Years of education: Not on file    Highest education level: Not on file   Occupational History    Not on file   Tobacco Use    Smoking status: Never    Smokeless tobacco: Never   Vaping Use    Vaping Use: never used   Substance and Sexual Activity    Alcohol use: Yes     Comment: weekly    Drug use: No    Sexual activity: Yes     Partners: Male     Birth control/protection: None     Comment:    Other Topics Concern    Not on file   Social History Narrative    Not on file     Social Determinants of Health     Financial Resource Strain: Not on file   Food Insecurity: Not on file   Transportation Needs: Not on file   Physical Activity: Not on file   Stress: Not on file   Social Connections: Not on file   Interpersonal Safety: Not on file     Family History   Problem Relation Age of Onset    Heart Mother          alive 76 as of 5/15, hx MI, PE, HTN, TIA; Susan's Factor V Leiden mutation analysis test was NEG 1/27/14    Hypertension Mother     Neurological Disorder Mother         TIA hx    Cancer, Skin Melanoma Mother 50        skin/melanoma    Hyperlipidemia Father         alive 76 as of 5/15, hyperlipid, hx carcinoid tumors    Blood Disorder Father         anemia hx    Leukemia Brother 43        cll leukemia    Cancer, Breast Maternal Cousin     Cancer, Colon Neg Hx     Cervical cancer Neg Hx     Cancer, Ovarian Neg Hx      Review of systems    Constitutional:  Patient denies fever, chills, tiredness or malaise.    HENT:  Denies or sore throat or other probs lately  Eyes:  Denies change in visual acuity, pain, burning, itching, or discharge  Respiratory:  Denies cough, shortness of breath or wheeze.    Cardiovascular:  Denies chest pain, palpitations, edema.    GI:  Denies abdominal pain, nausea, vomiting, bloody stools, melena or diarrhea.    :  Denies urine retention, painful urination, blood in urine.  May get occasional nocturia/mild frequency.    Musculoskeletal:  Denies back pain, neck pain, joint pain or leg swelling.    Integument:  Denies rash, itching.    Neurologic:  Denies unusual headache, focal weakness or sensory changes.    Endocrine:  Denies polyuria, polydipsia or temperature intolerance.    Lymphatic:  Denies swollen glands, unusual weight loss.  Immunologic:  Denies hives, seasonal allergies.    All other systems reviewed and negative.      Physical Exam    Visit Vitals  /76 last 126/76 p81   Pulse 74   Temp 97.8 °F (36.6 °C) (Oral)   Ht 5' 2.5\" (1.588 m)   Wt 75 kg (165 lb 5.5 oz) down 0.2; last up 4.5   LMP 02/20/2024 (Approximate)   SpO2 97%   BMI 29.76 kg/m²     Body mass index is 29.76 kg/m².  General:  Alert, oriented x 3 well developed overweight very nice WF in no apparent distress, with mask on  HEENT:  Normocephalic. Atraumatic. Glasses. Pupils equal, round, react to light and  accommodation.  No icterus, nystagmus, discharge or conjunctivitis. Bilateral external ears normal. Almost no wax.  Nasal mucosa ok.  Oropharynx moist. No oral exudates.  Neck: No JVD, thyromegaly, lymphadenopathy or bruit.    Lungs:  Clear to auscultation bilaterally. No respiratory distress. No wheezing or rales. No chest tenderness.    Cardiovascular:  Regular rate and rhythm, S1, S2 without murmur, rub, S3 or S4/gallop.    Abdominal:  Bowel sounds normal. ND NT. Borderline obese limits exam sensitivity; no detected HSM/ascites.  Extremities:  No cyanosis, clubbing or edema  Neurologic:  Alert & oriented x 3. Normal motor function. Normal sensory function. No focal deficits noted.    Integument:  Warm. Dry. No erythema. No rash    LABS:  Hemoglobin A1C (%)   Date Value   06/29/2023 7.0 (H)   No results found for: \"MALBCR\"    Sodium (mmol/L)   Date Value   02/01/2023 138     Potassium (mmol/L)   Date Value   02/01/2023 4.2     Chloride (mmol/L)   Date Value   02/01/2023 106     Glucose (mg/dL)   Date Value   02/01/2023 126 (H)     Calcium (mg/dL)   Date Value   02/01/2023 9.3     Carbon Dioxide (mmol/L)   Date Value   02/01/2023 26     BUN (mg/dL)   Date Value   02/01/2023 13     Creatinine (mg/dL)   Date Value   02/01/2023 0.72     WBC (K/mcL)   Date Value   11/22/2021 8.6     RBC (mil/mcL)   Date Value   11/22/2021 4.02     HCT (%)   Date Value   11/22/2021 39.2     HGB (g/dL)   Date Value   11/22/2021 12.5     PLT (K/mcL)   Date Value   11/22/2021 237     GPT/ALT (Units/L)   Date Value   11/22/2021 53     GOT/AST (Units/L)   Date Value   11/22/2021 37     Alkaline Phosphatase (Units/L)   Date Value   11/22/2021 97     Bilirubin, Total (mg/dL)   Date Value   11/22/2021 0.3     Cholesterol (mg/dL)   Date Value   02/01/2023 253 (H)     HDL (mg/dL)   Date Value   02/01/2023 67     Triglycerides (mg/dL)   Date Value   02/01/2023 177 (H)     LDL (mg/dL)   Date Value   02/01/2023 151 (H)     TSH (mcUnits/mL)   Date  Value   11/22/2021 1.146     11/22/21 VitD=19.6    2/1/23 VitD=26.8    5/30/18  MAMMO SCREENING W ELISSA BILATERAL DATE OF EXAM:  5/30/2018 11:10 AM.  CLINICAL INDICATION:  Screening.    COMPARISON EXAMS:  4/20/2017 and 7/12/2013.      IMPRESSION:  Larger bilobed medial right breast mass with adjacent smaller nodule. Further evaluation with right breast ultrasound.   3D/tomosynthesis would be appropriate for screening of breasts of this density.        BI-RADS:  0 - Incomplete:  Needs Additional Imaging Evaluation.   In compliance with federal regulations, the patient will be sent a lay summary of the results of this mammogram.    6/7/18 US R breast IMPRESSION: Cluster of cysts at the 1:00 position of the right breast. Recommend routine follow-up.   BI-RADS 2 - Benign.     Assessment & Plan    1. Health Maintenance Visit feeling good  DM2 on diet control: good to excellent range with 6/29/23 A1c=7.0 from 2/23=7.1 made new Dx DM2.  Glucose trace elevated at 107 11/22/21, no FH DM except 2/21/24 states father borderline  We d/w issue at length; will recheck A1c and f/u 3 months, decide on whether to add metformin as she joined [employer], down 1 lb today, plans more  DM starting statin today  Labs: a1c MA BMP FCP VitD   2 Screen for DM nephropathy: check MA   3 Screen for DM retinopathy: advise yearly eye exam. 2018 ok per pt, was to f/u 2/23   4 Hyperlipidemia: high HDL good; 2/23 TG and LDL not target.   We'd like all DM pt on statin.    2/21/24 start atorvastatin 20/d   5 Obese by BMI: we d/w diet exercise wt loss.  Checks for DM and thyroid done, recheck for DM 2023.  Joined a [employer]    6 Seasonal allergic rhinitis :  Uses Claritin-D over-the-counter to good effect during seasonal allergy times, and sometimes albuterol inhaler when needed. Under control, comes and goes, spring to frost   7 Exercise induced asthma: mild intermittent, well-controlled continue albuterol inhaler p.r.n.  Refilled 2/21/24 starting to  go to gym, the only time she needs is with exercise.  Jogging takes puff before exercise helps.  Hx PFTs normal though methacholine challenge at 19% borderline positive, improvement with albuterol inhaler, suggested mild RAD.   8 Vitamin D deficiency:  2/23 VitD=26.8 from 11/21 VitD=19.6. Again rec OTC VitD supplement, was on it in past stopped after 6 months, we d/w importance, states taking it 2/21/24   9 Migraine headx hx : resolved off OCP. Has used ketoprofen in the past to solve matters    10 Screening for thyroid disorder:  11/21 TSH WNL. TSH and free T4 were okay in the past also   11  Chondromalacia of both patellae hx: no c/o today.  Hx occasional aches if extensive walking or running activities; rests a few days and resolves; conservative management appropriate, ortho if problem exacerbates    12 Carpal tunnel syndrome, bilateral: quiet now; controlled with use of wrist splints uses off and on, if worsening or problematic she can call for referral to ortho   Will's neuroma of right foot history, has been quiet for some years    13 Hx Nonhealing skin lesion: resolved; was on right forearm volar aspect, underneath her wrist splint. Saw derm 2015, got a cream ; some AK's LN2 removed over last couple years 2018   14 Depression, situational:  11/22/21 doing ok.  10/12/20 noted depression due to daughter with SI getting therapy.  We d/w options, SSRI Rx, Behavioral Health.  No SI/HI; was moderate; refused referral or SSRI, preferred labs including thyroid TSH; time; strategies including starting exercise regularly, but then failed to get labs done.  If more probs, she can call anytime for refer to , or consider SSRI Rx.   15 Screening for other disorders of blood and blood-forming organs:  CBC in the past okay , used to donate blood and her iron had been borderline low with a ferritin of 11, but she no longer donates blood    16 GERD: hx RUQ pain: now quiet on omeprazole 40/d helps.  10/20/20 we d/w try  taper to qod etc, we d/w risks.  9/15 GI Dr Mary negron incl US abd normal; Urology Dr Salazar 1/16 renogram normal bilat fxn; Dr Salazar noted:  ----------------------------  ASSESSMENT/PLAN Allan reynoso 41-year-old woman who has abdominal pain, currently she fine. Reviewed the different etiologies of abdominal pain. Recent CT scan revealed a questionable left UPJ obstruction. Reviewed with patient, I'm going to order a renal scan Lasix washout to rule out obstruction. -follow up in 3 weeks.   Deny Salazar MD  ----------------------------  4/4/16 GI Dr Hernandez GI f/u noted:  ===================  HPI: Susan Don is a  41 year old female with a history of abdominal pain. Reflux.  Daily problems. Has had problems with reflux and RUQ and epigastric pain. Will have intermittent reflux, bile tasting in the mouth. Mild constipation symptoms also.  Has not tried any acid suppression medications. Very rarely take NSAIDs or her ketoprofen, maybe once per month.  ASSESSMENT: 41 year old female with a history of epigastric, right upper quadrant pain   Epigastric, right upper quadrant pain. Ultrasound, CT scan unremarkable for source. With her reflux symptoms need to consider reflux esophagitis. Prior lab testing with H. pylori and tissue transglutaminase antibodies negative. Also consider biliary dyskinesia.  PLAN:  1.  Discussed with the patient about EGD for further evaluation. Patient would like to try medications first. So we will start omeprazole 40 mg daily. Advised to call the office in 2-4 weeks if her symptoms persist. With then set the patient up for an EGD. If everything is negative, then consider HIDA scan with CCK.  ===================   17 H/O gynecological procedure with Dr. Bradley in [place] incl 9/13/19 pap ok.  States has appt with Dr Bradley 5/2023 for exams.  Vit D screen postmenopausal: 11/22/21 check Vit D once  Dr Bradley note 6/29/23 included:  =====================  IMPRESSION:  Routine annual  gynecologic exam  Perimenopause, menopausal symptoms were reviewed  Recommend screening colonoscopy  Elevated hemoglobin A1c  EDUCATION: Reviewed health maintenance including diet, regular exercise, periodic exams and age appropriate screening tests.  Recommend low-carbohydrate, low sugar diet and increased exercise for insulin resistance.  PLAN:  Pap smear:  performed today  Medications:  Reviewed calcium and vitamin-D intake recommendations.  Mammogram:  yearly  Labs:  Repeat hemoglobin A1c.  Patient will be notified of results, and should follow-up with PCP if still elevated.  Return to clinic:  1-2 years or p.r.n.  =====================   18 H/O mammograms: 5/8/23 mammo with US benign (cyst on L).  5/30/18 abn then US benign. Follows GYN Dr Bradley incl exams.    19 Colon cancer screening: asymptomatic no family history of colon cancer, colonoscopy now rec age 45+.  Pt agreed so 1/23 referred GI Dr Hernandez for colonoscopy but as of 2/21/24 did't get that done.    2/21/24 we d/w, states had no time off work so didn't go. 2/21/24 re-refer open access colonoscopy, states can do it in March.  Father had carcinoid tumors   20 Need for pneumococcal vaccination: had pvax23 2014.  2/21/24 give Prevnar 20   21 Need for influenza vaccination: had it 10/17/23.  Rec yearly in Fall 22 Need for covid vax: We d/w covid19 and precautions, and boosters.  Had Moderna covid vax's 4/22/21+ 5/21/21+ 4/20/22.   Pt declined more   23 Need for zoster vaccine: now over age 50 generally rec Shingrix vax's.  Stated didn't have cpox that she knows of but \"I was tested and have immunity\"   24 Need for Tdap vaccination : had it 5/5/15, consider repeat 2025.   25     Greater than 50% of time was spent counseling, coordinating care, reviewing chart labs, etc. on several high potential morbidity active issues.  Several issues are unstable or not to target.  More than 40 minutes was taken.  26   F/u 3 months fast on DM cholesterol etc   no

## 2024-02-22 ENCOUNTER — APPOINTMENT (OUTPATIENT)
Dept: BURN CARE | Facility: CLINIC | Age: 49
End: 2024-02-22
Payer: COMMERCIAL

## 2024-02-22 ENCOUNTER — OUTPATIENT (OUTPATIENT)
Dept: OUTPATIENT SERVICES | Facility: HOSPITAL | Age: 49
LOS: 1 days | End: 2024-02-22
Payer: COMMERCIAL

## 2024-02-22 VITALS — DIASTOLIC BLOOD PRESSURE: 96 MMHG | TEMPERATURE: 97.1 F | SYSTOLIC BLOOD PRESSURE: 126 MMHG | HEART RATE: 97 BPM

## 2024-02-22 DIAGNOSIS — Z95.810 PRESENCE OF AUTOMATIC (IMPLANTABLE) CARDIAC DEFIBRILLATOR: Chronic | ICD-10-CM

## 2024-02-22 DIAGNOSIS — Z90.49 ACQUIRED ABSENCE OF OTHER SPECIFIED PARTS OF DIGESTIVE TRACT: Chronic | ICD-10-CM

## 2024-02-22 DIAGNOSIS — Z00.8 ENCOUNTER FOR OTHER GENERAL EXAMINATION: ICD-10-CM

## 2024-02-22 DIAGNOSIS — Z98.890 OTHER SPECIFIED POSTPROCEDURAL STATES: Chronic | ICD-10-CM

## 2024-02-22 PROCEDURE — 99213 OFFICE O/P EST LOW 20 MIN: CPT

## 2024-02-22 PROCEDURE — 87070 CULTURE OTHR SPECIMN AEROBIC: CPT

## 2024-02-23 DIAGNOSIS — G47.33 OBSTRUCTIVE SLEEP APNEA (ADULT) (PEDIATRIC): ICD-10-CM

## 2024-02-23 DIAGNOSIS — E66.01 MORBID (SEVERE) OBESITY DUE TO EXCESS CALORIES: ICD-10-CM

## 2024-02-23 DIAGNOSIS — Z95.810 PRESENCE OF AUTOMATIC (IMPLANTABLE) CARDIAC DEFIBRILLATOR: ICD-10-CM

## 2024-02-23 DIAGNOSIS — I10 ESSENTIAL (PRIMARY) HYPERTENSION: ICD-10-CM

## 2024-02-23 DIAGNOSIS — B95.2 ENTEROCOCCUS AS THE CAUSE OF DISEASES CLASSIFIED ELSEWHERE: ICD-10-CM

## 2024-02-23 DIAGNOSIS — L97.822 NON-PRESSURE CHRONIC ULCER OF OTHER PART OF LEFT LOWER LEG WITH FAT LAYER EXPOSED: ICD-10-CM

## 2024-02-23 DIAGNOSIS — Z87.891 PERSONAL HISTORY OF NICOTINE DEPENDENCE: ICD-10-CM

## 2024-02-23 DIAGNOSIS — B96.5 PSEUDOMONAS (AERUGINOSA) (MALLEI) (PSEUDOMALLEI) AS THE CAUSE OF DISEASES CLASSIFIED ELSEWHERE: ICD-10-CM

## 2024-02-23 DIAGNOSIS — I49.5 SICK SINUS SYNDROME: ICD-10-CM

## 2024-02-23 DIAGNOSIS — I48.20 CHRONIC ATRIAL FIBRILLATION, UNSPECIFIED: ICD-10-CM

## 2024-02-23 DIAGNOSIS — Z79.01 LONG TERM (CURRENT) USE OF ANTICOAGULANTS: ICD-10-CM

## 2024-02-23 DIAGNOSIS — I83.028 VARICOSE VEINS OF LEFT LOWER EXTREMITY WITH ULCER OTHER PART OF LOWER LEG: ICD-10-CM

## 2024-02-23 DIAGNOSIS — I27.20 PULMONARY HYPERTENSION, UNSPECIFIED: ICD-10-CM

## 2024-02-23 DIAGNOSIS — I73.00 RAYNAUD'S SYNDROME WITHOUT GANGRENE: ICD-10-CM

## 2024-02-23 DIAGNOSIS — Z86.16 PERSONAL HISTORY OF COVID-19: ICD-10-CM

## 2024-02-24 LAB — BACTERIA SPEC CULT: NORMAL
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
## 2024-03-05 NOTE — REASON FOR VISIT
[Revisit] : revisit [Were you seen in the Emergency Room?] : seen in the emergency room [Family Member] : family member [Were you admitted to the burn center at [facility]?] : not admitted to the burn center at [facility]

## 2024-03-05 NOTE — PHYSICAL EXAM
[Healing] : healing [4] : 4 out of 10 [Size%: ______] : Size: [unfilled]% [Abnormal] : abnormal [Large] : medium [] : yes [de-identified] : cont current pain meds and consider pain management [de-identified] : The venous stasis ulcers  left leg  measures  [id number]. and is healing post debridement and BTM.  The patient was instructed to clean  the wound with soap and water. Continue local wound care. and compression.  Follow up 1-2 months.  [TWNoteComboBox1] : ABD pad

## 2024-03-05 NOTE — ASSESSMENT
[FreeTextEntry1] : The venous stasis ulcers  left leg  measures  [id number]. and is healing post debridement and BTM.  The patient was instructed to clean  the wound with soap and water. Continue local wound care. and compression.  Follow up 1-2 months.  [Wound Care] : wound care

## 2024-03-06 DIAGNOSIS — I83.029 VARICOSE VEINS OF LEFT LOWER EXTREMITY WITH ULCER OF UNSPECIFIED SITE: ICD-10-CM

## 2024-03-06 DIAGNOSIS — L97.929 NON-PRESSURE CHRONIC ULCER OF UNSPECIFIED PART OF LEFT LOWER LEG WITH UNSPECIFIED SEVERITY: ICD-10-CM

## 2024-03-06 DIAGNOSIS — Z98.890 OTHER SPECIFIED POSTPROCEDURAL STATES: ICD-10-CM

## 2024-03-07 ENCOUNTER — OUTPATIENT (OUTPATIENT)
Dept: OUTPATIENT SERVICES | Facility: HOSPITAL | Age: 49
LOS: 1 days | End: 2024-03-07
Payer: COMMERCIAL

## 2024-03-07 ENCOUNTER — APPOINTMENT (OUTPATIENT)
Dept: BURN CARE | Facility: CLINIC | Age: 49
End: 2024-03-07
Payer: COMMERCIAL

## 2024-03-07 VITALS — TEMPERATURE: 98.6 F | SYSTOLIC BLOOD PRESSURE: 136 MMHG | DIASTOLIC BLOOD PRESSURE: 88 MMHG | HEART RATE: 86 BPM

## 2024-03-07 DIAGNOSIS — Z95.810 PRESENCE OF AUTOMATIC (IMPLANTABLE) CARDIAC DEFIBRILLATOR: Chronic | ICD-10-CM

## 2024-03-07 DIAGNOSIS — Z00.8 ENCOUNTER FOR OTHER GENERAL EXAMINATION: ICD-10-CM

## 2024-03-07 DIAGNOSIS — Z90.49 ACQUIRED ABSENCE OF OTHER SPECIFIED PARTS OF DIGESTIVE TRACT: Chronic | ICD-10-CM

## 2024-03-07 DIAGNOSIS — Z98.890 OTHER SPECIFIED POSTPROCEDURAL STATES: Chronic | ICD-10-CM

## 2024-03-07 PROCEDURE — 99212 OFFICE O/P EST SF 10 MIN: CPT

## 2024-03-07 NOTE — REASON FOR VISIT
[Were you seen in the Emergency Room?] : seen in the emergency room [Revisit] : revisit [Were you admitted to the burn center at [facility]?] : not admitted to the burn center at [facility] [Family Member] : family member
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
## 2024-03-07 NOTE — ASSESSMENT
[FreeTextEntry1] : The venous stasis ulcers  left leg  measures  [id number]. and is healing post debridement and BTM.  The patient was instructed to clean  the wound with soap and water. Continue local wound care. and compression.  Follow up 1-2 months. Staples removed and silastic removed. [Wound Care] : wound care

## 2024-03-07 NOTE — PHYSICAL EXAM
[Size%: ______] : Size: [unfilled]% [Healing] : healing [Infected?] : Infected: No [4] : 4 out of 10 [Abnormal] : abnormal [Large] : medium [] : no [de-identified] : cont current pain meds and consider pain management [de-identified] : The venous stasis ulcers  left leg  measures  [id number]. and is healing post debridement and BTM.  The patient was instructed to clean  the wound with soap and water. Continue local wound care. and compression.  Follow up 1-2 months. Staples removed and silastic removed. [TWNoteComboBox1] : xeroform

## 2024-03-09 LAB
CULTURE RESULTS: SIGNIFICANT CHANGE UP
CULTURE RESULTS: SIGNIFICANT CHANGE UP
SPECIMEN SOURCE: SIGNIFICANT CHANGE UP
SPECIMEN SOURCE: SIGNIFICANT CHANGE UP

## 2024-03-11 DIAGNOSIS — I83.029 VARICOSE VEINS OF LEFT LOWER EXTREMITY WITH ULCER OF UNSPECIFIED SITE: ICD-10-CM

## 2024-03-11 DIAGNOSIS — L97.929 NON-PRESSURE CHRONIC ULCER OF UNSPECIFIED PART OF LEFT LOWER LEG WITH UNSPECIFIED SEVERITY: ICD-10-CM

## 2024-03-11 DIAGNOSIS — Z98.890 OTHER SPECIFIED POSTPROCEDURAL STATES: ICD-10-CM

## 2024-03-18 RX ORDER — TRAMADOL HYDROCHLORIDE 50 MG/1
1 TABLET ORAL
Qty: 20 | Refills: 0
Start: 2024-03-18 | End: 2024-03-22

## 2024-03-28 ENCOUNTER — OUTPATIENT (OUTPATIENT)
Dept: OUTPATIENT SERVICES | Facility: HOSPITAL | Age: 49
LOS: 1 days | End: 2024-03-28
Payer: COMMERCIAL

## 2024-03-28 ENCOUNTER — APPOINTMENT (OUTPATIENT)
Dept: BURN CARE | Facility: CLINIC | Age: 49
End: 2024-03-28
Payer: COMMERCIAL

## 2024-03-28 VITALS — HEART RATE: 99 BPM | DIASTOLIC BLOOD PRESSURE: 81 MMHG | SYSTOLIC BLOOD PRESSURE: 118 MMHG | TEMPERATURE: 98.2 F

## 2024-03-28 DIAGNOSIS — Z00.8 ENCOUNTER FOR OTHER GENERAL EXAMINATION: ICD-10-CM

## 2024-03-28 DIAGNOSIS — Z98.890 OTHER SPECIFIED POSTPROCEDURAL STATES: Chronic | ICD-10-CM

## 2024-03-28 DIAGNOSIS — Z95.810 PRESENCE OF AUTOMATIC (IMPLANTABLE) CARDIAC DEFIBRILLATOR: Chronic | ICD-10-CM

## 2024-03-28 PROCEDURE — 99212 OFFICE O/P EST SF 10 MIN: CPT
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
## 2024-03-28 NOTE — REASON FOR VISIT
[Revisit] : revisit [Were you seen in the Emergency Room?] : seen in the emergency room [Were you admitted to the burn center at [facility]?] : not admitted to the burn center at [facility] [Family Member] : family member

## 2024-03-28 NOTE — ASSESSMENT
[FreeTextEntry1] : The venous stasis ulcers  left leg  measures  [id number]. and is healing post debridement and BTM.  The patient was instructed to clean  the wound with soap and water. Continue local wound care. and compression.  Follow up 2 weeks. [Wound Care] : wound care

## 2024-03-28 NOTE — PHYSICAL EXAM
[Healing] : healing [Size%: ______] : Size: [unfilled]% [Infected?] : Infected: No [4] : 4 out of 10 [Abnormal] : abnormal [Large] : medium [] : no [de-identified] : cont current pain meds and consider pain management [de-identified] : The venous stasis ulcers  left leg  measures  [id number]. and is healing post debridement and BTM.  The patient was instructed to clean  the wound with soap and water. Continue local wound care. and compression.  Follow up 2 weeks. [TWNoteComboBox1] : xeroform

## 2024-04-01 DIAGNOSIS — Z98.890 OTHER SPECIFIED POSTPROCEDURAL STATES: ICD-10-CM

## 2024-04-01 DIAGNOSIS — I83.029 VARICOSE VEINS OF LEFT LOWER EXTREMITY WITH ULCER OF UNSPECIFIED SITE: ICD-10-CM

## 2024-04-01 DIAGNOSIS — L97.929 NON-PRESSURE CHRONIC ULCER OF UNSPECIFIED PART OF LEFT LOWER LEG WITH UNSPECIFIED SEVERITY: ICD-10-CM

## 2024-04-09 ENCOUNTER — APPOINTMENT (OUTPATIENT)
Dept: BURN CARE | Facility: CLINIC | Age: 49
End: 2024-04-09

## 2024-04-29 ENCOUNTER — APPOINTMENT (OUTPATIENT)
Dept: ELECTROPHYSIOLOGY | Facility: CLINIC | Age: 49
End: 2024-04-29
Payer: COMMERCIAL

## 2024-04-29 VITALS
HEIGHT: 66 IN | SYSTOLIC BLOOD PRESSURE: 115 MMHG | WEIGHT: 293 LBS | TEMPERATURE: 97.6 F | DIASTOLIC BLOOD PRESSURE: 72 MMHG | RESPIRATION RATE: 17 BRPM | BODY MASS INDEX: 47.09 KG/M2 | HEART RATE: 74 BPM

## 2024-04-29 DIAGNOSIS — Z45.018 ENCOUNTER FOR ADJUSTMENT AND MANAGEMENT OF OTHER PART OF CARDIAC PACEMAKER: ICD-10-CM

## 2024-04-29 DIAGNOSIS — Z45.02 ENCOUNTER FOR ADJUSTMENT AND MANAGEMENT OF AUTOMATIC IMPLANTABLE CARDIAC DEFIBRILLATOR: ICD-10-CM

## 2024-04-29 DIAGNOSIS — I48.0 PAROXYSMAL ATRIAL FIBRILLATION: ICD-10-CM

## 2024-04-29 DIAGNOSIS — I49.5 SICK SINUS SYNDROME: ICD-10-CM

## 2024-04-29 DIAGNOSIS — I48.92 UNSPECIFIED ATRIAL FLUTTER: ICD-10-CM

## 2024-04-29 DIAGNOSIS — I45.5 OTHER SPECIFIED HEART BLOCK: ICD-10-CM

## 2024-04-29 PROCEDURE — 99214 OFFICE O/P EST MOD 30 MIN: CPT

## 2024-04-29 PROCEDURE — 93284 PRGRMG EVAL IMPLANTABLE DFB: CPT

## 2024-04-29 RX ORDER — BUMETANIDE 2 MG/1
2 TABLET ORAL DAILY
Refills: 0 | Status: ACTIVE | COMMUNITY

## 2024-04-29 RX ORDER — CIPROFLOXACIN HYDROCHLORIDE 500 MG/1
500 TABLET, FILM COATED ORAL TWICE DAILY
Qty: 20 | Refills: 0 | Status: COMPLETED | COMMUNITY
Start: 2024-01-11 | End: 2024-04-29

## 2024-04-29 RX ORDER — SILVER SULFADIAZINE 10 MG/G
1 CREAM TOPICAL TWICE DAILY
Qty: 400 | Refills: 2 | Status: ACTIVE | COMMUNITY
Start: 2024-01-11

## 2024-04-29 RX ORDER — APIXABAN 5 MG/1
5 TABLET, FILM COATED ORAL
Qty: 180 | Refills: 1 | Status: ACTIVE | COMMUNITY

## 2024-04-29 RX ORDER — AMOXICILLIN AND CLAVULANATE POTASSIUM 875; 125 MG/1; MG/1
875-125 TABLET, COATED ORAL
Qty: 20 | Refills: 0 | Status: COMPLETED | COMMUNITY
Start: 2024-01-11 | End: 2024-04-29

## 2024-04-29 RX ORDER — SILDENAFIL 20 MG/1
20 TABLET ORAL 3 TIMES DAILY
Refills: 0 | Status: COMPLETED | COMMUNITY
End: 2024-04-29

## 2024-04-29 RX ORDER — SILVER SULFADIAZINE 10 MG/G
1 CREAM TOPICAL TWICE DAILY
Qty: 2 | Refills: 1 | Status: COMPLETED | COMMUNITY
Start: 2024-01-04 | End: 2024-04-29

## 2024-04-29 RX ORDER — COLLAGENASE SANTYL 250 [ARB'U]/G
250 OINTMENT TOPICAL DAILY
Qty: 4 | Refills: 1 | Status: COMPLETED | COMMUNITY
Start: 2023-09-14 | End: 2024-04-29

## 2024-04-29 RX ORDER — SILVER SULFADIAZINE 10 MG/G
1 CREAM TOPICAL TWICE DAILY
Qty: 400 | Refills: 1 | Status: COMPLETED | COMMUNITY
Start: 2023-08-17 | End: 2024-04-29

## 2024-04-29 RX ORDER — METOPROLOL SUCCINATE 100 MG/1
100 TABLET, EXTENDED RELEASE ORAL
Qty: 90 | Refills: 3 | Status: COMPLETED | COMMUNITY
Start: 2023-09-18 | End: 2024-04-29

## 2024-04-29 RX ORDER — SULFAMETHOXAZOLE AND TRIMETHOPRIM 800; 160 MG/1; MG/1
800-160 TABLET ORAL TWICE DAILY
Qty: 14 | Refills: 0 | Status: COMPLETED | COMMUNITY
Start: 2023-12-14 | End: 2024-04-29

## 2024-04-29 RX ORDER — OXYCODONE AND ACETAMINOPHEN 5; 325 MG/1; MG/1
5-325 TABLET ORAL EVERY 6 HOURS
Qty: 30 | Refills: 0 | Status: COMPLETED | COMMUNITY
Start: 2024-02-22 | End: 2024-04-29

## 2024-04-29 RX ORDER — OXYCODONE AND ACETAMINOPHEN 5; 325 MG/1; MG/1
5-325 TABLET ORAL EVERY 6 HOURS
Qty: 30 | Refills: 0 | Status: COMPLETED | COMMUNITY
Start: 2024-02-01 | End: 2024-04-29

## 2024-04-29 RX ORDER — GENTAMICIN SULFATE 1 MG/G
0.1 OINTMENT TOPICAL DAILY
Qty: 1 | Refills: 3 | Status: COMPLETED | COMMUNITY
Start: 2023-09-19 | End: 2024-04-29

## 2024-04-29 RX ORDER — GENTAMICIN SULFATE 1 MG/G
0.1 OINTMENT TOPICAL TWICE DAILY
Qty: 2 | Refills: 1 | Status: COMPLETED | COMMUNITY
Start: 2023-10-12 | End: 2024-04-29

## 2024-04-29 RX ORDER — OXYCODONE AND ACETAMINOPHEN 5; 325 MG/1; MG/1
5-325 TABLET ORAL EVERY 6 HOURS
Qty: 30 | Refills: 0 | Status: COMPLETED | COMMUNITY
Start: 2023-12-19 | End: 2024-04-29

## 2024-04-29 RX ORDER — OXYCODONE AND ACETAMINOPHEN 5; 325 MG/1; MG/1
5-325 TABLET ORAL
Qty: 30 | Refills: 0 | Status: COMPLETED | COMMUNITY
Start: 2024-03-07 | End: 2024-04-29

## 2024-04-29 RX ORDER — OXYCODONE AND ACETAMINOPHEN 5; 325 MG/1; MG/1
5-325 TABLET ORAL
Qty: 30 | Refills: 0 | Status: ACTIVE | COMMUNITY
Start: 2024-03-28

## 2024-04-29 RX ORDER — ATORVASTATIN CALCIUM 80 MG/1
80 TABLET, FILM COATED ORAL DAILY
Refills: 0 | Status: ACTIVE | COMMUNITY

## 2024-04-29 RX ORDER — METFORMIN HYDROCHLORIDE 500 MG/1
240 TABLET, EXTENDED RELEASE ORAL
Refills: 0 | Status: ACTIVE | COMMUNITY

## 2024-04-29 RX ORDER — OXYCODONE AND ACETAMINOPHEN 5; 325 MG/1; MG/1
5-325 TABLET ORAL
Qty: 30 | Refills: 0 | Status: COMPLETED | COMMUNITY
Start: 2023-10-26 | End: 2024-04-29

## 2024-04-29 RX ORDER — CHLORHEXIDINE GLUCONATE 213 G/1000ML
4 SOLUTION TOPICAL
Qty: 2 | Refills: 0 | Status: COMPLETED | COMMUNITY
Start: 2023-03-02 | End: 2024-04-29

## 2024-04-29 RX ORDER — OXYCODONE AND ACETAMINOPHEN 5; 325 MG/1; MG/1
5-325 TABLET ORAL
Qty: 40 | Refills: 0 | Status: COMPLETED | COMMUNITY
Start: 2024-01-04 | End: 2024-04-29

## 2024-04-29 RX ORDER — METOPROLOL SUCCINATE 100 MG/1
100 TABLET, EXTENDED RELEASE ORAL DAILY
Qty: 90 | Refills: 3 | Status: ACTIVE | COMMUNITY

## 2024-04-29 NOTE — CARDIOLOGY SUMMARY
[de-identified] : 12/18/2023: Sinus tachycardia at 103 bpm with occasional PVCs, IRBBB 8/282023: sinus tachycardia at 109 bpm; 8/21/2023 tach ( bpm) (AFl with variable block?) [de-identified] : TTE 05/06/2023: LVEF 55-60%, ascending aorta 3.7 cm across, moderate TR/ pulmonary HTN. [de-identified] : 12/26/2023: upgraded to a dual ICD (MDT) and extraction of old RV pacing lead (by Dr. Monique) 08/30/2023: DC-PPM placement - Left bundle area pacing [de-identified] : Cath 7/11/2023 Mild CAD. Mod pulm HTN. Likely combination of HFpEF, obesity hypoventilation syndrome and Raynaud.

## 2024-04-29 NOTE — HISTORY OF PRESENT ILLNESS
[FreeTextEntry1] : EP: Dr. Frazier Cardio: Dr. Hartley  AF s/p ablation 6 yrs ago at [facility] (stopped taking Xarelto), LE DVT, KANDICE c/w CPAP, Raynaud's former smoker, with recent hospitalization from 6/7-6/8 for palpitations and SOB, found to be in aflutter 2:1. Cardizem with minimal response. Set up for TOLU/CV but self-converted prior to procedure. Recently had an outpatient ECHO a month ago which showed evidence of moderate pulmonary hypertension.  Of note, pt also has a chronic left posterior calf wound for which she sees Dr. Doyle. She has had multiple infections and has been on multiple antibiotics for it.   8/21/2023: Presents as hospital follow up. Feels daily palpitations. She is interested in ablation. Has KANDICE and is c/w CPAP. Denies chest pain or syncope. Had recent cath with minimal CAD.  8/28/2023: 7h20 am 6.2 sec pause. intermittent complete AV block with HR < 20 bpm.  8/30/2023: dual PPM placement - Left bundle area pacing 9/2/2023; VT 2.5 sec at 214 bpm 11/24/2023 9 seconds VT at 200 bpm; at 8 pm associated with presyncope and moderate to severe dizziness. 12/17/2023; 11 seconds VT at 360 ms, 11 pm 12/18/2023: 11 seconds VT at 200 bpm 12/18/2023: The patient has been experiencing brief episodes of moderate to severe dizziness, palpitations, and presyncope.  12/26/2023: upgraded to a dual ICD (MDT) and extraction of old RV pacing lead (by Dr. Monique)  Patient denies chest pain/discomfort, dyspnea or any syncopal episodes. Patient is severely claustrophobic.

## 2024-04-29 NOTE — PROCEDURE
[No] : not [NSR] : normal sinus rhythm [See Device Printout] : See device printout [Pacemaker] : pacemaker [Longevity: ___ months] : The estimated remaining battery life is [unfilled] months [Normal] : The battery status is normal. [Threshold Testing Performed] : Threshold testing was performed [___V @] : [unfilled] V [___ ms] : [unfilled] ms [Programmed for Longevity] : output reprogrammed for improved battery longevity [Counters Reset] : the counters were reset [Sensing Amplitude ___mv] : sensing amplitude was [unfilled] mv [Lead Imp:  ___ohms] : lead impedance was [unfilled] ohms [de-identified] : 73 bpm [de-identified] : [employer] [de-identified] : Sara MARRERO DR MRI [id number] [de-identified] : [id number] [de-identified] : 08/30/2023 [de-identified] : AAI<-->DDD [de-identified] :  [de-identified] : Turned on Alerts.  [de-identified] : Multiple NSVT episodes c/w AT vs SVT OptiVol below threshold AP 16.6%  <0.1% Transmitting on [facility].

## 2024-04-29 NOTE — PHYSICAL EXAM
[Well Developed] : well developed [Well Nourished] : well nourished [No Acute Distress] : no acute distress [Obese] : obese [Normal Conjunctiva] : normal conjunctiva [Normal Venous Pressure] : normal venous pressure [Normal S1, S2] : normal S1, S2 [No Murmur] : no murmur [Clear Lung Fields] : clear lung fields [Good Air Entry] : good air entry [No Respiratory Distress] : no respiratory distress  [Soft] : abdomen soft [Normal Gait] : normal gait [No Edema] : no edema [No Rash] : no rash [Moves all extremities] : moves all extremities [Normal Speech] : normal speech [Alert and Oriented] : alert and oriented [Normal memory] : normal memory [General Appearance - Well Developed] : well developed [Normal Appearance] : normal appearance [Well Groomed] : well groomed [General Appearance - Well Nourished] : well nourished [No Deformities] : no deformities [General Appearance - In No Acute Distress] : no acute distress [] : no respiratory distress [Left Infraclavicular] : left infraclavicular area [Clean] : clean [Dry] : dry [Well-Healed] : well-healed [de-identified] : tachycardic

## 2024-04-29 NOTE — DISCUSSION/SUMMARY
[FreeTextEntry1] : Ms. Kayla Snow is a pleasant 49-year-old woman with morbid obesity, moderate pulmonary hypertension, KANDICE on CPAP, AF s/p ablation 6 yrs ago at [facility] (stopped taking Xarelto), LE DVT, KANDICE c/w CPAP, Raynaud's syndrome, former smoker, paroxysmal atrial fibrillation and atrial aflutter 2:1, Tachy-adriana syndrome. pause 6.2 seconds, s/p Left bundle area pacing on 8/30/2023, multiple episodes of symptomatic ventricular tachycardia.   NSVT Events: 9/2/2023; VT 2.5 sec at 214 bpm 11/24/2023 9 seconds VT at 200 bpm; at 8 pm associated with presyncope and moderate to severe dizziness. 12/17/2023; 11 seconds VT at 360 ms, 11 pm 12/18/2023: 11 seconds VT at 200 bpm  # Patient has multiple NSVT lasting 9-11 seconds associated with symptoms.  - Etiology of NSVT is unclear. No significant CAD and normal LV systolic function - Continue metoprolol 200 mg per day. (Increased LOV from 150mg QD) - Unable to complete CMR due to claustrophobia, obesity and presence of PPM. - For the treatment of symptomatic Ventricular Tachycardia, pt was upgraded to a dual ICD (MDT) and extraction of old RV pacing lead on 12/26/2023 by Dr. Monique. - PET scan to assess for sarcoid d/t VT events.  # Paroxysmal AFib/AFlutter - AF [place] currently <0.1%  # KANDICE  - Compliant with CPAP  Of note: patient s/p L-leg synthetic graft on 02/09/2024 with Dr. Doyle.   I interrogated and reprogrammed her device as described in procedure. Her wound is healed properly, with no signs of inflammation, infection or bleeding. I discussed with patient plan of care in great details. I discussed remote monitoring with her, and need to call office if she does manual transmission, I answered all her questions to her satisfaction. Patient was pleased with the visit.    Patient will follow with me in 3-4mo post tests. Please do not hesitate to contact me at [phone] if you have any further questions regarding this patient care.

## 2024-05-09 ENCOUNTER — OUTPATIENT (OUTPATIENT)
Dept: OUTPATIENT SERVICES | Facility: HOSPITAL | Age: 49
LOS: 1 days | End: 2024-05-09
Payer: COMMERCIAL

## 2024-05-09 ENCOUNTER — APPOINTMENT (OUTPATIENT)
Dept: BURN CARE | Facility: CLINIC | Age: 49
End: 2024-05-09
Payer: COMMERCIAL

## 2024-05-09 DIAGNOSIS — I25.118 ATHEROSCLEROTIC HEART DISEASE OF NATIVE CORONARY ARTERY WITH OTHER FORMS OF ANGINA PECTORIS: ICD-10-CM

## 2024-05-09 DIAGNOSIS — Z90.49 ACQUIRED ABSENCE OF OTHER SPECIFIED PARTS OF DIGESTIVE TRACT: Chronic | ICD-10-CM

## 2024-05-09 DIAGNOSIS — Z00.8 ENCOUNTER FOR OTHER GENERAL EXAMINATION: ICD-10-CM

## 2024-05-09 DIAGNOSIS — Z95.810 PRESENCE OF AUTOMATIC (IMPLANTABLE) CARDIAC DEFIBRILLATOR: Chronic | ICD-10-CM

## 2024-05-09 DIAGNOSIS — Z86.79 PERSONAL HISTORY OF OTHER DISEASES OF THE CIRCULATORY SYSTEM: ICD-10-CM

## 2024-05-09 DIAGNOSIS — Z86.39 PERSONAL HISTORY OF OTHER ENDOCRINE, NUTRITIONAL AND METABOLIC DISEASE: ICD-10-CM

## 2024-05-09 DIAGNOSIS — Z98.890 OTHER SPECIFIED POSTPROCEDURAL STATES: Chronic | ICD-10-CM

## 2024-05-09 PROCEDURE — 99212 OFFICE O/P EST SF 10 MIN: CPT

## 2024-05-09 NOTE — REASON FOR VISIT
[Revisit] : revisit [Were you seen in the Emergency Room?] : seen in the emergency room [Were you admitted to the burn center at [facility]?] : not admitted to the burn center at [facility] [Family Member] : family member

## 2024-05-09 NOTE — ASSESSMENT
[FreeTextEntry1] : The venous stasis ulcers  left leg  measures  [id number]. and is healing post debridement and BTM.  The patient was instructed to clean  the wound with soap and water. Continue local wound care. and compression.  Follow up 2 weeks.  Ace wrap compression applied.  [Wound Care] : wound care
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
## 2024-05-09 NOTE — PHYSICAL EXAM
[Healing] : healing [Size%: ______] : Size: [unfilled]% [Infected?] : Infected: No [4] : 4 out of 10 [Abnormal] : abnormal [Large] : medium [] : no [de-identified] :  pain management [de-identified] : The venous stasis ulcers  left leg  measures  [id number]. and is healing post debridement and BTM.  The patient was instructed to clean  the wound with soap and water. Continue local wound care. and compression.  Follow up 2 weeks.  Ace wrap compression applied.  [TWNoteComboBox1] : xeroform

## 2024-05-11 LAB — BACTERIA SPEC CULT: ABNORMAL

## 2024-05-13 DIAGNOSIS — I83.029 VARICOSE VEINS OF LEFT LOWER EXTREMITY WITH ULCER OF UNSPECIFIED SITE: ICD-10-CM

## 2024-05-13 DIAGNOSIS — I48.91 UNSPECIFIED ATRIAL FIBRILLATION: ICD-10-CM

## 2024-05-13 DIAGNOSIS — Z98.890 OTHER SPECIFIED POSTPROCEDURAL STATES: ICD-10-CM

## 2024-05-13 DIAGNOSIS — I10 ESSENTIAL (PRIMARY) HYPERTENSION: ICD-10-CM

## 2024-05-13 DIAGNOSIS — L97.929 NON-PRESSURE CHRONIC ULCER OF UNSPECIFIED PART OF LEFT LOWER LEG WITH UNSPECIFIED SEVERITY: ICD-10-CM

## 2024-05-13 DIAGNOSIS — E11.9 TYPE 2 DIABETES MELLITUS WITHOUT COMPLICATIONS: ICD-10-CM

## 2024-05-13 DIAGNOSIS — I25.118 ATHEROSCLEROTIC HEART DISEASE OF NATIVE CORONARY ARTERY WITH OTHER FORMS OF ANGINA PECTORIS: ICD-10-CM

## 2024-05-14 RX ORDER — CIPROFLOXACIN HYDROCHLORIDE 500 MG/1
500 TABLET, FILM COATED ORAL
Qty: 14 | Refills: 0 | Status: ACTIVE | COMMUNITY
Start: 2024-05-14 | End: 1900-01-01

## 2024-06-06 ENCOUNTER — APPOINTMENT (OUTPATIENT)
Dept: BURN CARE | Facility: CLINIC | Age: 49
End: 2024-06-06
Payer: COMMERCIAL

## 2024-06-06 ENCOUNTER — OUTPATIENT (OUTPATIENT)
Dept: OUTPATIENT SERVICES | Facility: HOSPITAL | Age: 49
LOS: 1 days | End: 2024-06-06
Payer: COMMERCIAL

## 2024-06-06 VITALS — SYSTOLIC BLOOD PRESSURE: 133 MMHG | DIASTOLIC BLOOD PRESSURE: 88 MMHG | HEART RATE: 98 BPM

## 2024-06-06 DIAGNOSIS — Z98.890 OTHER SPECIFIED POSTPROCEDURAL STATES: Chronic | ICD-10-CM

## 2024-06-06 DIAGNOSIS — Z00.8 ENCOUNTER FOR OTHER GENERAL EXAMINATION: ICD-10-CM

## 2024-06-06 DIAGNOSIS — Z95.810 PRESENCE OF AUTOMATIC (IMPLANTABLE) CARDIAC DEFIBRILLATOR: Chronic | ICD-10-CM

## 2024-06-06 DIAGNOSIS — Z90.49 ACQUIRED ABSENCE OF OTHER SPECIFIED PARTS OF DIGESTIVE TRACT: Chronic | ICD-10-CM

## 2024-06-06 PROCEDURE — 99213 OFFICE O/P EST LOW 20 MIN: CPT

## 2024-06-06 NOTE — PHYSICAL EXAM
[Healing] : healing [Size%: ______] : Size: [unfilled]% [Infected?] : Infected: No [4] : 4 out of 10 [Abnormal] : abnormal [Large] : medium [] : no [de-identified] :  pain management [de-identified] :  Subjective:   - Summary: The patient, a 49-year-old female named Edy, presents for a follow-up visit regarding her chronic venous stasis disease and left lower leg ulcer.   - Chief Complaint (CC): Follow-up for chronic venous stasis disease and left lower leg ulcer.   - History of Present Illness: Edy, a 49-year-old female, has a history of chronic venous stasis disease and an ulcer on her left lower leg. She previously underwent debridement and biological tissue matrix (BTM) application, along with compression therapy. Approximately a year ago, she was prescribed Unna boots for her condition. However, she has not seen a vascular specialist for about a year. Edy has been following up with pain management for her leg pain, which is currently being treated with narcotic medications. She has a past medical history of obstructive sleep apnea, Raynaud's disease, and a cardiac pacemaker placement for a tachybrady syndrome. During the review of systems, the patient reports experiencing pain in her left lower leg, which is being managed with narcotics by her pain management team. She denies any respiratory complaints, shortness of breath, cardiac issues, chest pain, or abdominal pain.   - Past Medical History:     - Chronic venous stasis disease     - Obstructive sleep apnea     - Raynaud's disease     - Tachybrady syndrome (requiring cardiac pacemaker placement)   - Past Surgical History:     - Debridement and biological tissue matrix (BTM) application for left lower leg ulcer   - Family History:   - Social History:   - Review of Systems:     - Pain in the left lower leg, managed with narcotics by pain management      - No respiratory complaints      - No shortness of breath    - Medications:   - Allergies:   Objective:   - Diagnostic Results:   - Vital Signs:   - Physical Examination (PE): On physical examination, the left lower leg venous stasis ulcer measures approximately 20x 10 centimeters. A wound culture was obtained, and there was a mild odor with minimal drainage noted.   Assessment and Plan:   - 0:     - Chronic Venous Disease, Left Lower Leg Ulcer: The patient has a chronic venous disease with a left lower leg ulcer, which has been previously treated with debridement, biological tissue matrix application, and compression therapy. The current assessment reveals a large ulcer measuring 40 by 20 centimeters, with a mild odor and minimal drainage.     - Continue washing the wound with Hibiclens and soap and water      - Continue using Xeroform dry gauze dressings and Ace wraps     - Encourage the patient to seek a vascular consult      - Encourage the patient to consider using Unna boots      - Call the patient if the wound culture is positive      - Follow-up in 6 weeks    [TWNoteComboBox1] : xeroform

## 2024-06-06 NOTE — ASSESSMENT
[FreeTextEntry1] :  Subjective:   - Summary: The patient, a 49-year-old female named Edy, presents for a follow-up visit regarding her chronic venous stasis disease and left lower leg ulcer.   - Chief Complaint (CC): Follow-up for chronic venous stasis disease and left lower leg ulcer.   - History of Present Illness: Edy, a 49-year-old female, has a history of chronic venous stasis disease and an ulcer on her left lower leg. She previously underwent debridement and biological tissue matrix (BTM) application, along with compression therapy. Approximately a year ago, she was prescribed Unna boots for her condition. However, she has not seen a vascular specialist for about a year. Edy has been following up with pain management for her leg pain, which is currently being treated with narcotic medications. She has a past medical history of obstructive sleep apnea, Raynaud's disease, and a cardiac pacemaker placement for a tachybrady syndrome. During the review of systems, the patient reports experiencing pain in her left lower leg, which is being managed with narcotics by her pain management team. She denies any respiratory complaints, shortness of breath, cardiac issues, chest pain, or abdominal pain.   - Past Medical History:     - Chronic venous stasis disease     - Obstructive sleep apnea     - Raynaud's disease     - Tachybrady syndrome (requiring cardiac pacemaker placement)   - Past Surgical History:     - Debridement and biological tissue matrix (BTM) application for left lower leg ulcer   - Family History:   - Social History:   - Review of Systems:     - Pain in the left lower leg, managed with narcotics by pain management      - No respiratory complaints      - No shortness of breath    - Medications:   - Allergies:   Objective:   - Diagnostic Results:   - Vital Signs:   - Physical Examination (PE): On physical examination, the left lower leg venous stasis ulcer measures approximately 20x 10 centimeters. A wound culture was obtained, and there was a mild odor with minimal drainage noted.   Assessment and Plan:   - 0:     - Chronic Venous Disease, Left Lower Leg Ulcer: The patient has a chronic venous disease with a left lower leg ulcer, which has been previously treated with debridement, biological tissue matrix application, and compression therapy. The current assessment reveals a large ulcer measuring 40 by 20 centimeters, with a mild odor and minimal drainage.     - Continue washing the wound with Hibiclens and soap and water      - Continue using Xeroform dry gauze dressings and Ace wraps     - Encourage the patient to seek a vascular consult      - Encourage the patient to consider using Unna boots      - Call the patient if the wound culture is positive      - Follow-up in 6 weeks    [Wound Care] : wound care
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
## 2024-06-06 NOTE — REASON FOR VISIT
[Revisit] : revisit [Were you seen in the Emergency Room?] : seen in the emergency room [Were you admitted to the burn center at [facility]?] : not admitted to the burn center at [facility] [Family Member] : family member

## 2024-06-06 NOTE — REVIEW OF SYSTEMS
[Lower Ext Edema] : lower extremity edema [Negative] : Integumentary [FreeTextEntry1] :  pain left leg

## 2024-06-07 DIAGNOSIS — I83.029 VARICOSE VEINS OF LEFT LOWER EXTREMITY WITH ULCER OF UNSPECIFIED SITE: ICD-10-CM

## 2024-06-07 DIAGNOSIS — Z98.890 OTHER SPECIFIED POSTPROCEDURAL STATES: ICD-10-CM

## 2024-06-07 DIAGNOSIS — L97.929 NON-PRESSURE CHRONIC ULCER OF UNSPECIFIED PART OF LEFT LOWER LEG WITH UNSPECIFIED SEVERITY: ICD-10-CM

## 2024-06-08 LAB — BACTERIA SPEC CULT: ABNORMAL

## 2024-07-15 ENCOUNTER — APPOINTMENT (OUTPATIENT)
Dept: ELECTROPHYSIOLOGY | Facility: CLINIC | Age: 49
End: 2024-07-15

## 2024-07-18 ENCOUNTER — APPOINTMENT (OUTPATIENT)
Dept: BURN CARE | Facility: CLINIC | Age: 49
End: 2024-07-18

## 2024-07-28 ENCOUNTER — EMERGENCY (EMERGENCY)
Facility: HOSPITAL | Age: 49
LOS: 0 days | Discharge: HOME | End: 2024-07-28
Attending: STUDENT IN AN ORGANIZED HEALTH CARE EDUCATION/TRAINING PROGRAM
Payer: COMMERCIAL

## 2024-07-28 VITALS
SYSTOLIC BLOOD PRESSURE: 125 MMHG | TEMPERATURE: 98 F | OXYGEN SATURATION: 96 % | DIASTOLIC BLOOD PRESSURE: 85 MMHG | HEART RATE: 99 BPM | RESPIRATION RATE: 19 BRPM

## 2024-07-28 DIAGNOSIS — T82.9XXA UNSPECIFIED COMPLICATION OF CARDIAC AND VASCULAR PROSTHETIC DEVICE, IMPLANT AND GRAFT, INITIAL ENCOUNTER: ICD-10-CM

## 2024-07-28 DIAGNOSIS — Z90.49 ACQUIRED ABSENCE OF OTHER SPECIFIED PARTS OF DIGESTIVE TRACT: Chronic | ICD-10-CM

## 2024-07-28 DIAGNOSIS — G47.33 OBSTRUCTIVE SLEEP APNEA (ADULT) (PEDIATRIC): ICD-10-CM

## 2024-07-28 DIAGNOSIS — I48.92 UNSPECIFIED ATRIAL FLUTTER: ICD-10-CM

## 2024-07-28 DIAGNOSIS — Z95.810 PRESENCE OF AUTOMATIC (IMPLANTABLE) CARDIAC DEFIBRILLATOR: Chronic | ICD-10-CM

## 2024-07-28 DIAGNOSIS — Z98.890 OTHER SPECIFIED POSTPROCEDURAL STATES: Chronic | ICD-10-CM

## 2024-07-28 DIAGNOSIS — E78.5 HYPERLIPIDEMIA, UNSPECIFIED: ICD-10-CM

## 2024-07-28 DIAGNOSIS — Z86.718 PERSONAL HISTORY OF OTHER VENOUS THROMBOSIS AND EMBOLISM: ICD-10-CM

## 2024-07-28 DIAGNOSIS — Z95.810 PRESENCE OF AUTOMATIC (IMPLANTABLE) CARDIAC DEFIBRILLATOR: ICD-10-CM

## 2024-07-28 DIAGNOSIS — I11.0 HYPERTENSIVE HEART DISEASE WITH HEART FAILURE: ICD-10-CM

## 2024-07-28 DIAGNOSIS — I50.30 UNSPECIFIED DIASTOLIC (CONGESTIVE) HEART FAILURE: ICD-10-CM

## 2024-07-28 DIAGNOSIS — Z87.891 PERSONAL HISTORY OF NICOTINE DEPENDENCE: ICD-10-CM

## 2024-07-28 DIAGNOSIS — I49.5 SICK SINUS SYNDROME: ICD-10-CM

## 2024-07-28 DIAGNOSIS — I27.20 PULMONARY HYPERTENSION, UNSPECIFIED: ICD-10-CM

## 2024-07-28 DIAGNOSIS — Z79.01 LONG TERM (CURRENT) USE OF ANTICOAGULANTS: ICD-10-CM

## 2024-07-28 PROCEDURE — 99283 EMERGENCY DEPT VISIT LOW MDM: CPT | Mod: 25

## 2024-07-28 PROCEDURE — 93005 ELECTROCARDIOGRAM TRACING: CPT

## 2024-07-28 PROCEDURE — 93010 ELECTROCARDIOGRAM REPORT: CPT

## 2024-07-28 PROCEDURE — 99284 EMERGENCY DEPT VISIT MOD MDM: CPT

## 2024-07-28 NOTE — ED PROVIDER NOTE - OBJECTIVE STATEMENT
Patient is a 49y old  Female , former smoker, with PMH of HTN, HLD, A-flutter w/ RVR, adriana/tachy syndrome, s/p ablation, on Eliquis, s/p dual chamber ICD and extraction of pacemaker lead 12/2023. HFpEF (EF 55-60% on TTE 5/6/23), KANDICE on CPAP,  pHTN,  h/o LE DVT Presents today after her devices alarm went off.  Patient said she was washing her hands in the restroom when the sound of a [place] style EMS alarm went off for several seconds stopped and then went off for several seconds again.  Patient denied any chest pain shortness of breath preceding cardiac symptoms before during or after this alarm went off.  Patient tried reaching out to the [employer] company who wanted her to send a transmission via her phone.

## 2024-07-28 NOTE — ED PROVIDER NOTE - PHYSICAL EXAMINATION
CONSTITUTIONAL: Well-developed; well-nourished; in no acute distress.   SKIN: warm, dry  HEAD: Normocephalic; atraumatic.  EYES: PERRL, EOMI, no conjunctival erythema  ENT: No nasal discharge; airway clear.  NECK: Supple; non tender.  CARD: Regular rate and rhythm.   RESP: No wheezes, rales or rhonchi.  ABD: soft ntnd  PSYCH: Cooperative, appropriate.

## 2024-07-28 NOTE — ED PROVIDER NOTE - PROGRESS NOTE DETAILS
Patient's device was interrogated by myself and sent to [employer].  A device representative called me back saying there has been no activity in the last 3 weeks and the alarms sound that she heard is due to a field transmission between her phone and [employer].  [employer] representative gave me a phone number for a self-help to give the patient.

## 2024-07-28 NOTE — ED PROVIDER NOTE - PATIENT PORTAL LINK FT
You can access the FollowMyHealth Patient Portal offered by [facility] by registering at the following website: [email or web address]. By joining [employer]’s FollowMyHealth portal, you will also be able to view your health information using other applications (apps) compatible with our system.

## 2024-07-28 NOTE — ED PROVIDER NOTE - NSFOLLOWUPINSTRUCTIONS_ED_ALL_ED_FT
The patient / caregiver given detailed return precautions and advised to return to the emergency department if any new symptoms developed, symptoms worsened or for any concerns. The patient / caregiver offered the opportunity to ask questions and verbalized that they understand the diagnosis and discharge instructions.      Pacemaker Implantation, Adult  A person with a pacemaker implanted in their chest, showing the heart and the lead.   Pacemaker implantation is a procedure to put a pacemaker in the chest. A pacemaker is a small computer that sends electrical signals to the heart. This helps the heart beat normally. Some pacemakers keep information about heart rhythms. You may need this procedure if you have:  A slow heartbeat (bradycardia).  Repeated fainting (syncope), or you often have dizziness or light-headedness because of an irregular heart rate.  A weak heart that is not pumping enough blood to your body. The pacemaker can help your heart chambers beat in sync.  The pacemaker attaches to your heart with a wire called a lead. One or two leads may be needed. There are different types of pacemakers:  Transvenous pacemaker. This type is placed under the skin or muscle of your upper chest. The lead goes through a vein in the chest to the inside of the heart.  Epicardial pacemaker. This type is placed under the skin or muscle of your chest or belly. The lead goes through your chest to the outside of the heart.  Tell a health care provider about:  Any allergies you have.  All medicines you are taking, including vitamins, herbs, eye drops, creams, and over-the-counter medicines.  Any problems you or family members have had with anesthesia.  Any bleeding problems or bone disorders you have.  Any surgeries you have had.  Any medical conditions you have.  Whether you are pregnant or may be pregnant.  What are the risks?  Your health care provider will talk with you about risks. These may include:  Infection.  Bleeding.  Failure of the pacemaker or lead.  A collapsed lung or bleeding into a lung.  A blood clot inside a blood vessel with a lead.  Heart damage.  Infection inside the heart (endocarditis).  Allergic reactions to medicines.  What happens before the procedure?  When to stop eating and drinking    Clear liquid drinks, including water, tea, coffee, and juice.  Follow instructions from your provider about what you may eat and drink. These may include:  8 hours before your procedure  Stop eating most foods. Do not eat meat, fried foods, or fatty foods.  Eat only light foods, such as toast or crackers.  All liquids are okay except energy drinks and alcohol.  6 hours before your procedure  Stop eating.  Drink only clear liquids, such as water, clear fruit juice, black coffee, plain tea, and sports drinks.  Do not drink energy drinks or alcohol.  2 hours before your procedure  Stop drinking all liquids.  You may be allowed to take medicines with small sips of water.  If you do not follow your provider's instructions, your procedure may be delayed or canceled.    Medicines    Ask your provider about:  Changing or stopping your regular medicines. These include any diabetes medicines or blood thinners you take.  Taking medicines such as aspirin and ibuprofen. These medicines can thin your blood. Do not take them unless your provider tells you to.  Taking over-the-counter medicines, vitamins, herbs, and supplements.  Tests    You may have:  A heart test. This may include:  An electrocardiogram (ECG). Patches will be put on your skin to check your heart rhythm.  A chest X-ray.  An echocardiogram. Sound waves (ultrasound) are used to get an image of the heart.  A cardiac rhythm monitor. This records your heart rhythm and any events for a longer period of time.  Blood tests.  Genetic testing.  General instructions    Do not use any products that contain nicotine or tobacco. These products include cigarettes, chewing tobacco, and vaping devices, such as e-cigarettes. If you need help quitting, ask your provider.  Ask your provider:  How your surgery site will be marked.  What steps will be taken to help prevent infection. These steps may include:  Removing hair at the surgery site.  Washing skin with a soap that kills germs.  Receiving antibiotics.  If you will be going home right after the procedure, plan to have a responsible adult:  Take you home from the hospital or clinic. You will not be allowed to drive.  Care for you for the time you are told.  What happens during the procedure?  An IV will be inserted into one of your veins.  You may be given:  A sedative. This helps you relax.  Anesthesia. This keeps you from feeling pain. It will make you fall asleep for surgery.  The next steps vary depending on which pacemaker you will be getting.  If you are getting a transvenous pacemaker:  An incision will be made in your upper chest.  A pocket will be made for the pacemaker. It may be placed under the skin or between layers of muscle.  The lead will be put into a blood vessel that goes to the heart.  While X-rays are taken by an imaging machine (fluoroscopy), the lead will be moved through the vein to the inside of your heart.  The other end of the lead will be put under the skin and attached to the pacemaker.  If you are getting an epicardial pacemaker:  An incision will be made near your ribs or breastbone (sternum) for the lead.  The lead will be attached to the outside of your heart.  Another incision will be made in your chest or upper belly to make a pocket for the pacemaker.  The free end of the lead will be moved under the skin and attached to the pacemaker.  The pacemaker will be tested. Pictures may be taken to check the lead position.  The incisions will be closed with stitches (sutures), tape strips, or skin glue.  Bandages (dressings) will be placed over the incisions.  The procedure may vary among providers and hospitals.    What happens after the procedure?  Your blood pressure, heart rate, breathing rate, and blood oxygen level will be monitored until you leave the hospital or clinic.  You may be given antibiotics.  You will be given pain medicine.  An ECG and chest X-rays will be done.  Your provider will program the pacemaker.  If you were given a sedative during the procedure, it can affect you for several hours. Do not drive or operate machinery until your provider says that it is safe.  You will be given a pacemaker identification card. This card lists the implant date, device model, and  of your pacemaker.  This information is not intended to replace advice given to you by your health care provider. Make sure you discuss any questions you have with your health care provider. SMS [employer] help desk: [phone]      The patient / caregiver given detailed return precautions and advised to return to the emergency department if any new symptoms developed, symptoms worsened or for any concerns. The patient / caregiver offered the opportunity to ask questions and verbalized that they understand the diagnosis and discharge instructions.      Pacemaker Implantation, Adult  A person with a pacemaker implanted in their chest, showing the heart and the lead.   Pacemaker implantation is a procedure to put a pacemaker in the chest. A pacemaker is a small computer that sends electrical signals to the heart. This helps the heart beat normally. Some pacemakers keep information about heart rhythms. You may need this procedure if you have:  A slow heartbeat (bradycardia).  Repeated fainting (syncope), or you often have dizziness or light-headedness because of an irregular heart rate.  A weak heart that is not pumping enough blood to your body. The pacemaker can help your heart chambers beat in sync.  The pacemaker attaches to your heart with a wire called a lead. One or two leads may be needed. There are different types of pacemakers:  Transvenous pacemaker. This type is placed under the skin or muscle of your upper chest. The lead goes through a vein in the chest to the inside of the heart.  Epicardial pacemaker. This type is placed under the skin or muscle of your chest or belly. The lead goes through your chest to the outside of the heart.  Tell a health care provider about:  Any allergies you have.  All medicines you are taking, including vitamins, herbs, eye drops, creams, and over-the-counter medicines.  Any problems you or family members have had with anesthesia.  Any bleeding problems or bone disorders you have.  Any surgeries you have had.  Any medical conditions you have.  Whether you are pregnant or may be pregnant.  What are the risks?  Your health care provider will talk with you about risks. These may include:  Infection.  Bleeding.  Failure of the pacemaker or lead.  A collapsed lung or bleeding into a lung.  A blood clot inside a blood vessel with a lead.  Heart damage.  Infection inside the heart (endocarditis).  Allergic reactions to medicines.  What happens before the procedure?  When to stop eating and drinking    Clear liquid drinks, including water, tea, coffee, and juice.  Follow instructions from your provider about what you may eat and drink. These may include:  8 hours before your procedure  Stop eating most foods. Do not eat meat, fried foods, or fatty foods.  Eat only light foods, such as toast or crackers.  All liquids are okay except energy drinks and alcohol.  6 hours before your procedure  Stop eating.  Drink only clear liquids, such as water, clear fruit juice, black coffee, plain tea, and sports drinks.  Do not drink energy drinks or alcohol.  2 hours before your procedure  Stop drinking all liquids.  You may be allowed to take medicines with small sips of water.  If you do not follow your provider's instructions, your procedure may be delayed or canceled.    Medicines    Ask your provider about:  Changing or stopping your regular medicines. These include any diabetes medicines or blood thinners you take.  Taking medicines such as aspirin and ibuprofen. These medicines can thin your blood. Do not take them unless your provider tells you to.  Taking over-the-counter medicines, vitamins, herbs, and supplements.  Tests    You may have:  A heart test. This may include:  An electrocardiogram (ECG). Patches will be put on your skin to check your heart rhythm.  A chest X-ray.  An echocardiogram. Sound waves (ultrasound) are used to get an image of the heart.  A cardiac rhythm monitor. This records your heart rhythm and any events for a longer period of time.  Blood tests.  Genetic testing.  General instructions    Do not use any products that contain nicotine or tobacco. These products include cigarettes, chewing tobacco, and vaping devices, such as e-cigarettes. If you need help quitting, ask your provider.  Ask your provider:  How your surgery site will be marked.  What steps will be taken to help prevent infection. These steps may include:  Removing hair at the surgery site.  Washing skin with a soap that kills germs.  Receiving antibiotics.  If you will be going home right after the procedure, plan to have a responsible adult:  Take you home from the hospital or clinic. You will not be allowed to drive.  Care for you for the time you are told.  What happens during the procedure?  An IV will be inserted into one of your veins.  You may be given:  A sedative. This helps you relax.  Anesthesia. This keeps you from feeling pain. It will make you fall asleep for surgery.  The next steps vary depending on which pacemaker you will be getting.  If you are getting a transvenous pacemaker:  An incision will be made in your upper chest.  A pocket will be made for the pacemaker. It may be placed under the skin or between layers of muscle.  The lead will be put into a blood vessel that goes to the heart.  While X-rays are taken by an imaging machine (fluoroscopy), the lead will be moved through the vein to the inside of your heart.  The other end of the lead will be put under the skin and attached to the pacemaker.  If you are getting an epicardial pacemaker:  An incision will be made near your ribs or breastbone (sternum) for the lead.  The lead will be attached to the outside of your heart.  Another incision will be made in your chest or upper belly to make a pocket for the pacemaker.  The free end of the lead will be moved under the skin and attached to the pacemaker.  The pacemaker will be tested. Pictures may be taken to check the lead position.  The incisions will be closed with stitches (sutures), tape strips, or skin glue.  Bandages (dressings) will be placed over the incisions.  The procedure may vary among providers and hospitals.    What happens after the procedure?  Your blood pressure, heart rate, breathing rate, and blood oxygen level will be monitored until you leave the hospital or clinic.  You may be given antibiotics.  You will be given pain medicine.  An ECG and chest X-rays will be done.  Your provider will program the pacemaker.  If you were given a sedative during the procedure, it can affect you for several hours. Do not drive or operate machinery until your provider says that it is safe.  You will be given a pacemaker identification card. This card lists the implant date, device model, and  of your pacemaker.  This information is not intended to replace advice given to you by your health care provider. Make sure you discuss any questions you have with your health care provider.

## 2024-07-28 NOTE — ED PROVIDER NOTE - CLINICAL SUMMARY MEDICAL DECISION MAKING FREE TEXT BOX
50 y/o F pmh tachy adriana syndrome, aflutter, s/p AICD, here for evaluation of alarms coming from device.  Patient has no symptoms.  Normal exam.  Device interrogated the bedside.  Resident spoke with [employer] consultant, states that the alarm was from her device not syncing with her phone, and there were no events for the past 3 weeks.  Phone number was given by consultant to ED team to patient to call to address syncing issue.  Patient stable for discharge.

## 2024-07-28 NOTE — ED PROVIDER NOTE - ATTENDING CONTRIBUTION TO CARE
48 y/o F pmh tachy adriana syndrome, aflutter, s/p AICD, here for evaluation of alarms coming from device.  Patient has no symptoms.  Normal exam.  Device interrogated the bedside.  Resident spoke with [employer] consultant, states that the alarm was from her device not syncing with her phone, and there were no events for the past 3 weeks.  Phone number was given by consultant to ED team to patient to call to address syncing issue.  Patient stable for discharge.

## 2024-07-29 ENCOUNTER — APPOINTMENT (OUTPATIENT)
Dept: CARDIOLOGY | Facility: CLINIC | Age: 49
End: 2024-07-29
Payer: COMMERCIAL

## 2024-07-29 ENCOUNTER — NON-APPOINTMENT (OUTPATIENT)
Age: 49
End: 2024-07-29

## 2024-07-29 PROCEDURE — 93296 REM INTERROG EVL PM/IDS: CPT

## 2024-07-29 PROCEDURE — 93295 DEV INTERROG REMOTE 1/2/MLT: CPT

## 2024-10-28 ENCOUNTER — APPOINTMENT (OUTPATIENT)
Dept: CARDIOLOGY | Facility: CLINIC | Age: 49
End: 2024-10-28
Payer: COMMERCIAL

## 2024-10-28 ENCOUNTER — NON-APPOINTMENT (OUTPATIENT)
Age: 49
End: 2024-10-28

## 2024-10-28 PROCEDURE — 93295 DEV INTERROG REMOTE 1/2/MLT: CPT

## 2024-10-28 PROCEDURE — 93296 REM INTERROG EVL PM/IDS: CPT

## 2024-12-04 NOTE — PATIENT PROFILE ADULT - DO YOU LACK THE NECESSARY SUPPORT TO HELP YOU COPE WITH LIFE CHALLENGES?
Refill Decision Note   Lenore Washington  is requesting a refill authorization.  Brief Assessment and Rationale for Refill:  Approve     Medication Therapy Plan:         Comments:     Note composed:1:32 PM 12/04/2024            no

## 2025-01-29 ENCOUNTER — APPOINTMENT (OUTPATIENT)
Dept: CARDIOLOGY | Facility: CLINIC | Age: 50
End: 2025-01-29
Payer: COMMERCIAL

## 2025-01-29 ENCOUNTER — NON-APPOINTMENT (OUTPATIENT)
Age: 50
End: 2025-01-29

## 2025-01-29 PROCEDURE — 93295 DEV INTERROG REMOTE 1/2/MLT: CPT

## 2025-01-29 PROCEDURE — 93296 REM INTERROG EVL PM/IDS: CPT

## 2025-04-14 ENCOUNTER — APPOINTMENT (OUTPATIENT)
Dept: ELECTROPHYSIOLOGY | Facility: CLINIC | Age: 50
End: 2025-04-14
Payer: COMMERCIAL

## 2025-04-14 VITALS
HEART RATE: 77 BPM | BODY MASS INDEX: 47.09 KG/M2 | DIASTOLIC BLOOD PRESSURE: 68 MMHG | SYSTOLIC BLOOD PRESSURE: 112 MMHG | WEIGHT: 293 LBS | HEIGHT: 66 IN

## 2025-04-14 DIAGNOSIS — Z45.02 ENCOUNTER FOR ADJUSTMENT AND MANAGEMENT OF AUTOMATIC IMPLANTABLE CARDIAC DEFIBRILLATOR: ICD-10-CM

## 2025-04-14 DIAGNOSIS — I45.5 OTHER SPECIFIED HEART BLOCK: ICD-10-CM

## 2025-04-14 DIAGNOSIS — I48.0 PAROXYSMAL ATRIAL FIBRILLATION: ICD-10-CM

## 2025-04-14 DIAGNOSIS — I48.92 UNSPECIFIED ATRIAL FLUTTER: ICD-10-CM

## 2025-04-14 DIAGNOSIS — I49.5 SICK SINUS SYNDROME: ICD-10-CM

## 2025-04-14 PROCEDURE — 93000 ELECTROCARDIOGRAM COMPLETE: CPT | Mod: 59

## 2025-04-14 PROCEDURE — 93283 PRGRMG EVAL IMPLANTABLE DFB: CPT

## 2025-04-14 PROCEDURE — 99214 OFFICE O/P EST MOD 30 MIN: CPT | Mod: 25

## 2025-04-14 RX ORDER — SILDENAFIL 20 MG/1
20 TABLET ORAL 3 TIMES DAILY
Refills: 0 | Status: ACTIVE | COMMUNITY

## 2025-07-14 ENCOUNTER — APPOINTMENT (OUTPATIENT)
Dept: CARDIOLOGY | Facility: CLINIC | Age: 50
End: 2025-07-14
Payer: COMMERCIAL

## 2025-07-14 ENCOUNTER — NON-APPOINTMENT (OUTPATIENT)
Age: 50
End: 2025-07-14

## 2025-07-14 PROCEDURE — 93296 REM INTERROG EVL PM/IDS: CPT

## 2025-07-14 PROCEDURE — 93295 DEV INTERROG REMOTE 1/2/MLT: CPT

## 2025-07-30 ENCOUNTER — APPOINTMENT (OUTPATIENT)
Dept: PULMONOLOGY | Facility: CLINIC | Age: 50
End: 2025-07-30
Payer: COMMERCIAL

## 2025-07-30 VITALS
WEIGHT: 293 LBS | BODY MASS INDEX: 47.09 KG/M2 | SYSTOLIC BLOOD PRESSURE: 160 MMHG | HEIGHT: 66 IN | RESPIRATION RATE: 12 BRPM | HEART RATE: 105 BPM | OXYGEN SATURATION: 96 % | DIASTOLIC BLOOD PRESSURE: 90 MMHG

## 2025-07-30 DIAGNOSIS — J44.9 CHRONIC OBSTRUCTIVE PULMONARY DISEASE, UNSPECIFIED: ICD-10-CM

## 2025-07-30 DIAGNOSIS — G47.33 OBSTRUCTIVE SLEEP APNEA (ADULT) (PEDIATRIC): ICD-10-CM

## 2025-07-30 DIAGNOSIS — R06.02 SHORTNESS OF BREATH: ICD-10-CM

## 2025-07-30 PROCEDURE — G0296 VISIT TO DETERM LDCT ELIG: CPT

## 2025-07-30 PROCEDURE — 99204 OFFICE O/P NEW MOD 45 MIN: CPT

## 2025-07-30 PROCEDURE — G2211 COMPLEX E/M VISIT ADD ON: CPT | Mod: NC

## 2025-09-02 ENCOUNTER — APPOINTMENT (OUTPATIENT)
Dept: CARDIOTHORACIC SURGERY | Facility: CLINIC | Age: 50
End: 2025-09-02
Payer: COMMERCIAL

## 2025-09-02 VITALS — HEIGHT: 66 IN | BODY MASS INDEX: 47.09 KG/M2 | WEIGHT: 293 LBS

## 2025-09-02 DIAGNOSIS — Z87.891 PERSONAL HISTORY OF NICOTINE DEPENDENCE: ICD-10-CM

## 2025-09-02 PROCEDURE — ACP01: CPT | Mod: NC

## 2025-09-06 ENCOUNTER — OUTPATIENT (OUTPATIENT)
Dept: OUTPATIENT SERVICES | Facility: HOSPITAL | Age: 50
LOS: 1 days | Discharge: HOME | End: 2025-09-06
Payer: COMMERCIAL

## 2025-09-06 ENCOUNTER — APPOINTMENT (OUTPATIENT)
Dept: SLEEP CENTER | Facility: HOSPITAL | Age: 50
End: 2025-09-06

## 2025-09-06 DIAGNOSIS — Z95.810 PRESENCE OF AUTOMATIC (IMPLANTABLE) CARDIAC DEFIBRILLATOR: Chronic | ICD-10-CM

## 2025-09-06 DIAGNOSIS — Z90.49 ACQUIRED ABSENCE OF OTHER SPECIFIED PARTS OF DIGESTIVE TRACT: Chronic | ICD-10-CM

## 2025-09-06 DIAGNOSIS — Z98.890 OTHER SPECIFIED POSTPROCEDURAL STATES: Chronic | ICD-10-CM

## 2025-09-06 DIAGNOSIS — G47.33 OBSTRUCTIVE SLEEP APNEA (ADULT) (PEDIATRIC): ICD-10-CM

## 2025-09-06 PROCEDURE — 95811 POLYSOM 6/>YRS CPAP 4/> PARM: CPT

## 2025-09-06 PROCEDURE — 95811 POLYSOM 6/>YRS CPAP 4/> PARM: CPT | Mod: 26

## 2025-09-09 DIAGNOSIS — G47.33 OBSTRUCTIVE SLEEP APNEA (ADULT) (PEDIATRIC): ICD-10-CM

## 2025-09-17 ENCOUNTER — TRANSCRIPTION ENCOUNTER (OUTPATIENT)
Age: 50
End: 2025-09-17

## 2025-09-19 ENCOUNTER — TRANSCRIPTION ENCOUNTER (OUTPATIENT)
Age: 50
End: 2025-09-19